# Patient Record
Sex: FEMALE | Race: BLACK OR AFRICAN AMERICAN | NOT HISPANIC OR LATINO | Employment: UNEMPLOYED | URBAN - METROPOLITAN AREA
[De-identification: names, ages, dates, MRNs, and addresses within clinical notes are randomized per-mention and may not be internally consistent; named-entity substitution may affect disease eponyms.]

---

## 2017-01-05 ENCOUNTER — HISTORICAL (OUTPATIENT)
Dept: RADIOLOGY | Facility: HOSPITAL | Age: 57
End: 2017-01-05

## 2017-01-09 ENCOUNTER — HISTORICAL (OUTPATIENT)
Dept: ADMINISTRATIVE | Facility: HOSPITAL | Age: 57
End: 2017-01-09

## 2017-01-26 ENCOUNTER — HISTORICAL (OUTPATIENT)
Dept: LAB | Facility: HOSPITAL | Age: 57
End: 2017-01-26

## 2017-02-06 ENCOUNTER — HISTORICAL (OUTPATIENT)
Dept: ADMINISTRATIVE | Facility: HOSPITAL | Age: 57
End: 2017-02-06

## 2017-03-06 ENCOUNTER — HISTORICAL (OUTPATIENT)
Dept: ADMINISTRATIVE | Facility: HOSPITAL | Age: 57
End: 2017-03-06

## 2017-03-27 ENCOUNTER — HISTORICAL (OUTPATIENT)
Dept: ADMINISTRATIVE | Facility: HOSPITAL | Age: 57
End: 2017-03-27

## 2017-04-25 ENCOUNTER — HISTORICAL (OUTPATIENT)
Dept: ADMINISTRATIVE | Facility: HOSPITAL | Age: 57
End: 2017-04-25

## 2017-05-03 ENCOUNTER — HISTORICAL (OUTPATIENT)
Dept: RADIOLOGY | Facility: HOSPITAL | Age: 57
End: 2017-05-03

## 2017-05-16 ENCOUNTER — HISTORICAL (OUTPATIENT)
Dept: ADMINISTRATIVE | Facility: HOSPITAL | Age: 57
End: 2017-05-16

## 2017-06-02 ENCOUNTER — HISTORICAL (OUTPATIENT)
Dept: ADMINISTRATIVE | Facility: HOSPITAL | Age: 57
End: 2017-06-02

## 2017-06-23 ENCOUNTER — HISTORICAL (OUTPATIENT)
Dept: ADMINISTRATIVE | Facility: HOSPITAL | Age: 57
End: 2017-06-23

## 2017-07-13 ENCOUNTER — HISTORICAL (OUTPATIENT)
Dept: ADMINISTRATIVE | Facility: HOSPITAL | Age: 57
End: 2017-07-13

## 2017-07-13 ENCOUNTER — HISTORICAL (OUTPATIENT)
Dept: RADIOLOGY | Facility: HOSPITAL | Age: 57
End: 2017-07-13

## 2017-07-24 ENCOUNTER — HISTORICAL (OUTPATIENT)
Dept: NUTRITION | Facility: HOSPITAL | Age: 57
End: 2017-07-24

## 2017-08-08 ENCOUNTER — HISTORICAL (OUTPATIENT)
Dept: ADMINISTRATIVE | Facility: HOSPITAL | Age: 57
End: 2017-08-08

## 2017-09-05 ENCOUNTER — HISTORICAL (OUTPATIENT)
Dept: ADMINISTRATIVE | Facility: HOSPITAL | Age: 57
End: 2017-09-05

## 2017-09-11 ENCOUNTER — HISTORICAL (OUTPATIENT)
Dept: RADIOLOGY | Facility: HOSPITAL | Age: 57
End: 2017-09-11

## 2017-10-05 ENCOUNTER — HISTORICAL (OUTPATIENT)
Dept: ADMINISTRATIVE | Facility: HOSPITAL | Age: 57
End: 2017-10-05

## 2017-11-02 ENCOUNTER — HISTORICAL (OUTPATIENT)
Dept: ADMINISTRATIVE | Facility: HOSPITAL | Age: 57
End: 2017-11-02

## 2017-11-30 ENCOUNTER — HISTORICAL (OUTPATIENT)
Dept: ADMINISTRATIVE | Facility: HOSPITAL | Age: 57
End: 2017-11-30

## 2017-12-15 ENCOUNTER — HISTORICAL (OUTPATIENT)
Dept: LAB | Facility: HOSPITAL | Age: 57
End: 2017-12-15

## 2017-12-21 ENCOUNTER — HISTORICAL (OUTPATIENT)
Dept: ADMINISTRATIVE | Facility: HOSPITAL | Age: 57
End: 2017-12-21

## 2018-01-16 ENCOUNTER — HISTORICAL (OUTPATIENT)
Dept: ADMINISTRATIVE | Facility: HOSPITAL | Age: 58
End: 2018-01-16

## 2018-02-16 ENCOUNTER — HISTORICAL (OUTPATIENT)
Dept: ADMINISTRATIVE | Facility: HOSPITAL | Age: 58
End: 2018-02-16

## 2018-02-23 ENCOUNTER — HISTORICAL (OUTPATIENT)
Dept: ENDOSCOPY | Facility: HOSPITAL | Age: 58
End: 2018-02-23

## 2018-03-13 ENCOUNTER — HISTORICAL (OUTPATIENT)
Dept: ADMINISTRATIVE | Facility: HOSPITAL | Age: 58
End: 2018-03-13

## 2018-04-12 ENCOUNTER — HISTORICAL (OUTPATIENT)
Dept: ADMINISTRATIVE | Facility: HOSPITAL | Age: 58
End: 2018-04-12

## 2018-05-10 ENCOUNTER — HISTORICAL (OUTPATIENT)
Dept: ADMINISTRATIVE | Facility: HOSPITAL | Age: 58
End: 2018-05-10

## 2018-05-24 ENCOUNTER — HISTORICAL (OUTPATIENT)
Dept: RADIOLOGY | Facility: HOSPITAL | Age: 58
End: 2018-05-24

## 2018-06-04 ENCOUNTER — HISTORICAL (OUTPATIENT)
Dept: LAB | Facility: HOSPITAL | Age: 58
End: 2018-06-04

## 2018-06-07 ENCOUNTER — HISTORICAL (OUTPATIENT)
Dept: ADMINISTRATIVE | Facility: HOSPITAL | Age: 58
End: 2018-06-07

## 2018-06-29 ENCOUNTER — HISTORICAL (OUTPATIENT)
Dept: ENDOSCOPY | Facility: HOSPITAL | Age: 58
End: 2018-06-29

## 2018-07-05 ENCOUNTER — HISTORICAL (OUTPATIENT)
Dept: ADMINISTRATIVE | Facility: HOSPITAL | Age: 58
End: 2018-07-05

## 2018-07-17 ENCOUNTER — HISTORICAL (OUTPATIENT)
Dept: ADMINISTRATIVE | Facility: HOSPITAL | Age: 58
End: 2018-07-17

## 2018-08-14 ENCOUNTER — HISTORICAL (OUTPATIENT)
Dept: ADMINISTRATIVE | Facility: HOSPITAL | Age: 58
End: 2018-08-14

## 2018-09-11 ENCOUNTER — HISTORICAL (OUTPATIENT)
Dept: ADMINISTRATIVE | Facility: HOSPITAL | Age: 58
End: 2018-09-11

## 2018-09-13 ENCOUNTER — HISTORICAL (OUTPATIENT)
Dept: LAB | Facility: HOSPITAL | Age: 58
End: 2018-09-13

## 2018-10-09 ENCOUNTER — HISTORICAL (OUTPATIENT)
Dept: ADMINISTRATIVE | Facility: HOSPITAL | Age: 58
End: 2018-10-09

## 2018-10-18 ENCOUNTER — HISTORICAL (OUTPATIENT)
Dept: LAB | Facility: HOSPITAL | Age: 58
End: 2018-10-18

## 2018-10-23 ENCOUNTER — HISTORICAL (OUTPATIENT)
Dept: ADMINISTRATIVE | Facility: HOSPITAL | Age: 58
End: 2018-10-23

## 2018-11-16 ENCOUNTER — HISTORICAL (OUTPATIENT)
Dept: ADMINISTRATIVE | Facility: HOSPITAL | Age: 58
End: 2018-11-16

## 2018-12-06 ENCOUNTER — HISTORICAL (OUTPATIENT)
Dept: ADMINISTRATIVE | Facility: HOSPITAL | Age: 58
End: 2018-12-06

## 2019-01-03 ENCOUNTER — HISTORICAL (OUTPATIENT)
Dept: ADMINISTRATIVE | Facility: HOSPITAL | Age: 59
End: 2019-01-03

## 2019-01-31 ENCOUNTER — HISTORICAL (OUTPATIENT)
Dept: ADMINISTRATIVE | Facility: HOSPITAL | Age: 59
End: 2019-01-31

## 2019-02-28 ENCOUNTER — HISTORICAL (OUTPATIENT)
Dept: ADMINISTRATIVE | Facility: HOSPITAL | Age: 59
End: 2019-02-28

## 2019-04-04 ENCOUNTER — HISTORICAL (OUTPATIENT)
Dept: ADMINISTRATIVE | Facility: HOSPITAL | Age: 59
End: 2019-04-04

## 2019-05-02 ENCOUNTER — HISTORICAL (OUTPATIENT)
Dept: ADMINISTRATIVE | Facility: HOSPITAL | Age: 59
End: 2019-05-02

## 2019-05-21 ENCOUNTER — HISTORICAL (OUTPATIENT)
Dept: LAB | Facility: HOSPITAL | Age: 59
End: 2019-05-21

## 2019-05-30 ENCOUNTER — HISTORICAL (OUTPATIENT)
Dept: ADMINISTRATIVE | Facility: HOSPITAL | Age: 59
End: 2019-05-30

## 2019-06-19 ENCOUNTER — HISTORICAL (OUTPATIENT)
Dept: RADIOLOGY | Facility: HOSPITAL | Age: 59
End: 2019-06-19

## 2019-06-27 ENCOUNTER — HISTORICAL (OUTPATIENT)
Dept: ADMINISTRATIVE | Facility: HOSPITAL | Age: 59
End: 2019-06-27

## 2019-07-25 ENCOUNTER — HISTORICAL (OUTPATIENT)
Dept: ADMINISTRATIVE | Facility: HOSPITAL | Age: 59
End: 2019-07-25

## 2019-08-22 ENCOUNTER — HISTORICAL (OUTPATIENT)
Dept: ADMINISTRATIVE | Facility: HOSPITAL | Age: 59
End: 2019-08-22

## 2019-09-19 ENCOUNTER — HISTORICAL (OUTPATIENT)
Dept: ADMINISTRATIVE | Facility: HOSPITAL | Age: 59
End: 2019-09-19

## 2019-10-10 ENCOUNTER — HISTORICAL (OUTPATIENT)
Dept: LAB | Facility: HOSPITAL | Age: 59
End: 2019-10-10

## 2019-10-24 ENCOUNTER — HISTORICAL (OUTPATIENT)
Dept: ADMINISTRATIVE | Facility: HOSPITAL | Age: 59
End: 2019-10-24

## 2019-11-21 ENCOUNTER — HISTORICAL (OUTPATIENT)
Dept: ADMINISTRATIVE | Facility: HOSPITAL | Age: 59
End: 2019-11-21

## 2019-12-19 ENCOUNTER — HISTORICAL (OUTPATIENT)
Dept: ADMINISTRATIVE | Facility: HOSPITAL | Age: 59
End: 2019-12-19

## 2020-01-16 ENCOUNTER — HISTORICAL (OUTPATIENT)
Dept: ADMINISTRATIVE | Facility: HOSPITAL | Age: 60
End: 2020-01-16

## 2020-01-16 ENCOUNTER — HISTORICAL (OUTPATIENT)
Dept: LAB | Facility: HOSPITAL | Age: 60
End: 2020-01-16

## 2020-02-13 ENCOUNTER — HISTORICAL (OUTPATIENT)
Dept: ADMINISTRATIVE | Facility: HOSPITAL | Age: 60
End: 2020-02-13

## 2020-03-19 ENCOUNTER — HISTORICAL (OUTPATIENT)
Dept: ADMINISTRATIVE | Facility: HOSPITAL | Age: 60
End: 2020-03-19

## 2020-04-16 ENCOUNTER — HISTORICAL (OUTPATIENT)
Dept: ADMINISTRATIVE | Facility: HOSPITAL | Age: 60
End: 2020-04-16

## 2020-05-14 ENCOUNTER — HISTORICAL (OUTPATIENT)
Dept: ADMINISTRATIVE | Facility: HOSPITAL | Age: 60
End: 2020-05-14

## 2020-06-11 ENCOUNTER — HISTORICAL (OUTPATIENT)
Dept: ADMINISTRATIVE | Facility: HOSPITAL | Age: 60
End: 2020-06-11

## 2020-07-09 ENCOUNTER — HISTORICAL (OUTPATIENT)
Dept: ADMINISTRATIVE | Facility: HOSPITAL | Age: 60
End: 2020-07-09

## 2020-08-13 ENCOUNTER — HISTORICAL (OUTPATIENT)
Dept: ADMINISTRATIVE | Facility: HOSPITAL | Age: 60
End: 2020-08-13

## 2020-09-03 ENCOUNTER — HISTORICAL (OUTPATIENT)
Dept: ADMINISTRATIVE | Facility: HOSPITAL | Age: 60
End: 2020-09-03

## 2020-09-21 ENCOUNTER — HISTORICAL (OUTPATIENT)
Dept: LAB | Facility: HOSPITAL | Age: 60
End: 2020-09-21

## 2020-10-01 ENCOUNTER — HISTORICAL (OUTPATIENT)
Dept: ADMINISTRATIVE | Facility: HOSPITAL | Age: 60
End: 2020-10-01

## 2020-10-22 ENCOUNTER — HISTORICAL (OUTPATIENT)
Dept: ADMINISTRATIVE | Facility: HOSPITAL | Age: 60
End: 2020-10-22

## 2020-10-27 ENCOUNTER — HISTORICAL (OUTPATIENT)
Dept: RADIOLOGY | Facility: HOSPITAL | Age: 60
End: 2020-10-27

## 2020-11-05 ENCOUNTER — HISTORICAL (OUTPATIENT)
Dept: ADMINISTRATIVE | Facility: HOSPITAL | Age: 60
End: 2020-11-05

## 2020-12-01 ENCOUNTER — HISTORICAL (OUTPATIENT)
Dept: ADMINISTRATIVE | Facility: HOSPITAL | Age: 60
End: 2020-12-01

## 2020-12-29 ENCOUNTER — HISTORICAL (OUTPATIENT)
Dept: ADMINISTRATIVE | Facility: HOSPITAL | Age: 60
End: 2020-12-29

## 2020-12-29 ENCOUNTER — HISTORICAL (OUTPATIENT)
Dept: LAB | Facility: HOSPITAL | Age: 60
End: 2020-12-29

## 2021-01-19 ENCOUNTER — HISTORICAL (OUTPATIENT)
Dept: ADMINISTRATIVE | Facility: HOSPITAL | Age: 61
End: 2021-01-19

## 2021-02-25 ENCOUNTER — HISTORICAL (OUTPATIENT)
Dept: ADMINISTRATIVE | Facility: HOSPITAL | Age: 61
End: 2021-02-25

## 2021-03-25 ENCOUNTER — HISTORICAL (OUTPATIENT)
Dept: ADMINISTRATIVE | Facility: HOSPITAL | Age: 61
End: 2021-03-25

## 2021-04-22 ENCOUNTER — HISTORICAL (OUTPATIENT)
Dept: ADMINISTRATIVE | Facility: HOSPITAL | Age: 61
End: 2021-04-22

## 2021-05-20 ENCOUNTER — HISTORICAL (OUTPATIENT)
Dept: ADMINISTRATIVE | Facility: HOSPITAL | Age: 61
End: 2021-05-20

## 2021-06-17 ENCOUNTER — HISTORICAL (OUTPATIENT)
Dept: ADMINISTRATIVE | Facility: HOSPITAL | Age: 61
End: 2021-06-17

## 2021-07-15 ENCOUNTER — HISTORICAL (OUTPATIENT)
Dept: ADMINISTRATIVE | Facility: HOSPITAL | Age: 61
End: 2021-07-15

## 2021-08-19 ENCOUNTER — HISTORICAL (OUTPATIENT)
Dept: ADMINISTRATIVE | Facility: HOSPITAL | Age: 61
End: 2021-08-19

## 2021-09-16 ENCOUNTER — HISTORICAL (OUTPATIENT)
Dept: ADMINISTRATIVE | Facility: HOSPITAL | Age: 61
End: 2021-09-16

## 2021-09-16 ENCOUNTER — HISTORICAL (OUTPATIENT)
Dept: LAB | Facility: HOSPITAL | Age: 61
End: 2021-09-16

## 2021-10-19 ENCOUNTER — HISTORICAL (OUTPATIENT)
Dept: ADMINISTRATIVE | Facility: HOSPITAL | Age: 61
End: 2021-10-19

## 2021-11-09 ENCOUNTER — HISTORICAL (OUTPATIENT)
Dept: ADMINISTRATIVE | Facility: HOSPITAL | Age: 61
End: 2021-11-09

## 2021-11-30 ENCOUNTER — HISTORICAL (OUTPATIENT)
Dept: ADMINISTRATIVE | Facility: HOSPITAL | Age: 61
End: 2021-11-30

## 2021-12-08 ENCOUNTER — HISTORICAL (OUTPATIENT)
Dept: LAB | Facility: HOSPITAL | Age: 61
End: 2021-12-08

## 2021-12-23 ENCOUNTER — HISTORICAL (OUTPATIENT)
Dept: ADMINISTRATIVE | Facility: HOSPITAL | Age: 61
End: 2021-12-23

## 2022-01-20 ENCOUNTER — HISTORICAL (OUTPATIENT)
Dept: ADMINISTRATIVE | Facility: HOSPITAL | Age: 62
End: 2022-01-20

## 2022-01-20 LAB
INR PPP: 2.4
PROTHROMBIN TIME: 29 S

## 2022-02-24 ENCOUNTER — HISTORICAL (OUTPATIENT)
Dept: ADMINISTRATIVE | Facility: HOSPITAL | Age: 62
End: 2022-02-24

## 2022-03-24 ENCOUNTER — HISTORICAL (OUTPATIENT)
Dept: ADMINISTRATIVE | Facility: HOSPITAL | Age: 62
End: 2022-03-24

## 2022-03-28 ENCOUNTER — HISTORICAL (OUTPATIENT)
Dept: RADIOLOGY | Facility: HOSPITAL | Age: 62
End: 2022-03-28

## 2022-04-21 ENCOUNTER — HISTORICAL (OUTPATIENT)
Dept: ADMINISTRATIVE | Facility: HOSPITAL | Age: 62
End: 2022-04-21
Payer: MEDICARE

## 2022-05-18 DIAGNOSIS — Z86.718 PERSONAL HISTORY OF VENOUS THROMBOSIS AND EMBOLISM: Primary | ICD-10-CM

## 2022-05-18 DIAGNOSIS — Z86.711 PERSONAL HISTORY OF PE (PULMONARY EMBOLISM): ICD-10-CM

## 2022-05-19 ENCOUNTER — ANTI-COAG VISIT (OUTPATIENT)
Dept: CARDIOLOGY | Facility: HOSPITAL | Age: 62
End: 2022-05-19
Payer: MEDICARE

## 2022-05-19 DIAGNOSIS — Z86.711 PERSONAL HISTORY OF PE (PULMONARY EMBOLISM): ICD-10-CM

## 2022-05-19 DIAGNOSIS — Z86.718 PERSONAL HISTORY OF VENOUS THROMBOSIS AND EMBOLISM: ICD-10-CM

## 2022-05-19 LAB — INR PPP: 2.7 (ref 2–3)

## 2022-05-19 PROCEDURE — 99211 OFF/OP EST MAY X REQ PHY/QHP: CPT

## 2022-05-19 PROCEDURE — 85610 PROTHROMBIN TIME: CPT

## 2022-05-26 DIAGNOSIS — Z87.891 HISTORY OF TOBACCO USE: Primary | ICD-10-CM

## 2022-06-06 DIAGNOSIS — Z86.711 PERSONAL HISTORY OF PE (PULMONARY EMBOLISM): Primary | ICD-10-CM

## 2022-06-06 DIAGNOSIS — Z86.718 PERSONAL HISTORY OF VENOUS THROMBOSIS AND EMBOLISM: ICD-10-CM

## 2022-06-07 ENCOUNTER — HOSPITAL ENCOUNTER (OUTPATIENT)
Dept: RADIOLOGY | Facility: HOSPITAL | Age: 62
Discharge: HOME | End: 2022-06-07
Attending: INTERNAL MEDICINE
Payer: MEDICARE

## 2022-06-07 DIAGNOSIS — Z87.891 HISTORY OF TOBACCO USE: ICD-10-CM

## 2022-06-07 PROCEDURE — 71271 CT THORAX LUNG CANCER SCR C-: CPT | Mod: TC

## 2022-06-08 ENCOUNTER — DOCUMENTATION ONLY (OUTPATIENT)
Dept: RADIOLOGY | Facility: HOSPITAL | Age: 62
End: 2022-06-08
Payer: MEDICARE

## 2022-06-08 VITALS — WEIGHT: 200 LBS | HEIGHT: 66 IN | BODY MASS INDEX: 32.14 KG/M2

## 2022-06-21 ENCOUNTER — ANTI-COAG VISIT (OUTPATIENT)
Dept: CARDIOLOGY | Facility: HOSPITAL | Age: 62
End: 2022-06-21
Payer: MEDICARE

## 2022-06-21 DIAGNOSIS — Z86.711 PERSONAL HISTORY OF PE (PULMONARY EMBOLISM): ICD-10-CM

## 2022-06-21 DIAGNOSIS — Z86.718 PERSONAL HISTORY OF VENOUS THROMBOSIS AND EMBOLISM: ICD-10-CM

## 2022-06-21 LAB — INR PPP: 2.7 (ref 2–3)

## 2022-06-21 PROCEDURE — 99211 OFF/OP EST MAY X REQ PHY/QHP: CPT

## 2022-06-21 PROCEDURE — 85610 PROTHROMBIN TIME: CPT

## 2022-06-30 DIAGNOSIS — Z86.711 PERSONAL HISTORY OF PE (PULMONARY EMBOLISM): Primary | ICD-10-CM

## 2022-06-30 DIAGNOSIS — Z86.718 PERSONAL HISTORY OF DVT (DEEP VEIN THROMBOSIS): ICD-10-CM

## 2022-07-07 ENCOUNTER — LAB VISIT (OUTPATIENT)
Dept: LAB | Facility: HOSPITAL | Age: 62
End: 2022-07-07
Attending: FAMILY MEDICINE
Payer: MEDICARE

## 2022-07-07 DIAGNOSIS — E11.9 DIABETES MELLITUS WITHOUT COMPLICATION: Primary | ICD-10-CM

## 2022-07-07 DIAGNOSIS — I10 ESSENTIAL HYPERTENSION, MALIGNANT: ICD-10-CM

## 2022-07-07 LAB
ALBUMIN SERPL-MCNC: 3.8 GM/DL (ref 3.4–4.8)
ALBUMIN/GLOB SERPL: 1.2 RATIO (ref 1.1–2)
ALP SERPL-CCNC: 64 UNIT/L (ref 40–150)
ALT SERPL-CCNC: 25 UNIT/L (ref 0–55)
AST SERPL-CCNC: 22 UNIT/L (ref 5–34)
BILIRUBIN DIRECT+TOT PNL SERPL-MCNC: 0.3 MG/DL
BUN SERPL-MCNC: 12.7 MG/DL (ref 9.8–20.1)
CALCIUM SERPL-MCNC: 9.8 MG/DL (ref 8.4–10.2)
CHLORIDE SERPL-SCNC: 105 MMOL/L (ref 98–107)
CO2 SERPL-SCNC: 27 MMOL/L (ref 23–31)
CREAT SERPL-MCNC: 0.89 MG/DL (ref 0.55–1.02)
EST. AVERAGE GLUCOSE BLD GHB EST-MCNC: 211.6 MG/DL
GLOBULIN SER-MCNC: 3.1 GM/DL (ref 2.4–3.5)
GLUCOSE SERPL-MCNC: 131 MG/DL (ref 82–115)
HBA1C MFR BLD: 9 %
POTASSIUM SERPL-SCNC: 3.5 MMOL/L (ref 3.5–5.1)
PROT SERPL-MCNC: 6.9 GM/DL (ref 5.8–7.6)
SODIUM SERPL-SCNC: 140 MMOL/L (ref 136–145)

## 2022-07-07 PROCEDURE — 80053 COMPREHEN METABOLIC PANEL: CPT

## 2022-07-07 PROCEDURE — 83036 HEMOGLOBIN GLYCOSYLATED A1C: CPT

## 2022-07-07 PROCEDURE — 36415 COLL VENOUS BLD VENIPUNCTURE: CPT

## 2022-07-12 DIAGNOSIS — M25.511 RIGHT SHOULDER PAIN: Primary | ICD-10-CM

## 2022-07-19 ENCOUNTER — ANTI-COAG VISIT (OUTPATIENT)
Dept: CARDIOLOGY | Facility: HOSPITAL | Age: 62
End: 2022-07-19
Payer: MEDICARE

## 2022-07-19 DIAGNOSIS — Z86.711 PERSONAL HISTORY OF PE (PULMONARY EMBOLISM): ICD-10-CM

## 2022-07-19 DIAGNOSIS — Z86.718 PERSONAL HISTORY OF VENOUS THROMBOSIS AND EMBOLISM: ICD-10-CM

## 2022-07-19 LAB — INR PPP: 2 (ref 2–3)

## 2022-07-19 PROCEDURE — 99211 OFF/OP EST MAY X REQ PHY/QHP: CPT

## 2022-07-19 PROCEDURE — 85610 PROTHROMBIN TIME: CPT

## 2022-08-16 ENCOUNTER — ANTI-COAG VISIT (OUTPATIENT)
Dept: CARDIOLOGY | Facility: HOSPITAL | Age: 62
End: 2022-08-16
Payer: MEDICARE

## 2022-08-16 DIAGNOSIS — Z86.711 PERSONAL HISTORY OF PE (PULMONARY EMBOLISM): ICD-10-CM

## 2022-08-16 DIAGNOSIS — Z86.718 PERSONAL HISTORY OF DVT (DEEP VEIN THROMBOSIS): ICD-10-CM

## 2022-08-16 LAB — INR PPP: 2.6 (ref 2–3)

## 2022-08-16 PROCEDURE — 85610 PROTHROMBIN TIME: CPT

## 2022-08-16 PROCEDURE — 99211 OFF/OP EST MAY X REQ PHY/QHP: CPT

## 2022-08-24 ENCOUNTER — OFFICE VISIT (OUTPATIENT)
Dept: ORTHOPEDICS | Facility: CLINIC | Age: 62
End: 2022-08-24
Payer: MEDICARE

## 2022-08-24 VITALS
BODY MASS INDEX: 32.96 KG/M2 | HEIGHT: 67 IN | WEIGHT: 210 LBS | TEMPERATURE: 98 F | DIASTOLIC BLOOD PRESSURE: 70 MMHG | HEART RATE: 74 BPM | SYSTOLIC BLOOD PRESSURE: 104 MMHG

## 2022-08-24 DIAGNOSIS — M65.341 ACQUIRED TRIGGER FINGER OF RIGHT RING FINGER: ICD-10-CM

## 2022-08-24 DIAGNOSIS — M54.12 CERVICAL RADICULOPATHY: ICD-10-CM

## 2022-08-24 DIAGNOSIS — M25.511 RIGHT SHOULDER PAIN, UNSPECIFIED CHRONICITY: Primary | ICD-10-CM

## 2022-08-24 DIAGNOSIS — M75.41 SHOULDER IMPINGEMENT SYNDROME, RIGHT: ICD-10-CM

## 2022-08-24 DIAGNOSIS — M75.111 NONTRAUMATIC INCOMPLETE TEAR OF RIGHT ROTATOR CUFF: ICD-10-CM

## 2022-08-24 DIAGNOSIS — M25.511 RIGHT SHOULDER PAIN: ICD-10-CM

## 2022-08-24 PROCEDURE — 4010F ACE/ARB THERAPY RXD/TAKEN: CPT | Mod: CPTII,,, | Performed by: ORTHOPAEDIC SURGERY

## 2022-08-24 PROCEDURE — 1159F PR MEDICATION LIST DOCUMENTED IN MEDICAL RECORD: ICD-10-PCS | Mod: CPTII,,, | Performed by: ORTHOPAEDIC SURGERY

## 2022-08-24 PROCEDURE — 3008F BODY MASS INDEX DOCD: CPT | Mod: CPTII,,, | Performed by: ORTHOPAEDIC SURGERY

## 2022-08-24 PROCEDURE — 4010F PR ACE/ARB THEARPY RXD/TAKEN: ICD-10-PCS | Mod: CPTII,,, | Performed by: ORTHOPAEDIC SURGERY

## 2022-08-24 PROCEDURE — 99204 PR OFFICE/OUTPT VISIT, NEW, LEVL IV, 45-59 MIN: ICD-10-PCS | Mod: 25,,, | Performed by: ORTHOPAEDIC SURGERY

## 2022-08-24 PROCEDURE — 3078F PR MOST RECENT DIASTOLIC BLOOD PRESSURE < 80 MM HG: ICD-10-PCS | Mod: CPTII,,, | Performed by: ORTHOPAEDIC SURGERY

## 2022-08-24 PROCEDURE — 20610 LARGE JOINT ASPIRATION/INJECTION: R SUBACROMIAL BURSA: ICD-10-PCS | Mod: RT,,, | Performed by: ORTHOPAEDIC SURGERY

## 2022-08-24 PROCEDURE — 3008F PR BODY MASS INDEX (BMI) DOCUMENTED: ICD-10-PCS | Mod: CPTII,,, | Performed by: ORTHOPAEDIC SURGERY

## 2022-08-24 PROCEDURE — 3074F PR MOST RECENT SYSTOLIC BLOOD PRESSURE < 130 MM HG: ICD-10-PCS | Mod: CPTII,,, | Performed by: ORTHOPAEDIC SURGERY

## 2022-08-24 PROCEDURE — 99204 OFFICE O/P NEW MOD 45 MIN: CPT | Mod: 25,,, | Performed by: ORTHOPAEDIC SURGERY

## 2022-08-24 PROCEDURE — 3078F DIAST BP <80 MM HG: CPT | Mod: CPTII,,, | Performed by: ORTHOPAEDIC SURGERY

## 2022-08-24 PROCEDURE — 20610 DRAIN/INJ JOINT/BURSA W/O US: CPT | Mod: RT,,, | Performed by: ORTHOPAEDIC SURGERY

## 2022-08-24 PROCEDURE — 1159F MED LIST DOCD IN RCRD: CPT | Mod: CPTII,,, | Performed by: ORTHOPAEDIC SURGERY

## 2022-08-24 PROCEDURE — 1160F PR REVIEW ALL MEDS BY PRESCRIBER/CLIN PHARMACIST DOCUMENTED: ICD-10-PCS | Mod: CPTII,,, | Performed by: ORTHOPAEDIC SURGERY

## 2022-08-24 PROCEDURE — 3074F SYST BP LT 130 MM HG: CPT | Mod: CPTII,,, | Performed by: ORTHOPAEDIC SURGERY

## 2022-08-24 PROCEDURE — 1160F RVW MEDS BY RX/DR IN RCRD: CPT | Mod: CPTII,,, | Performed by: ORTHOPAEDIC SURGERY

## 2022-08-24 RX ORDER — TRIAMCINOLONE ACETONIDE 1 MG/G
CREAM TOPICAL
COMMUNITY
Start: 2022-08-04

## 2022-08-24 RX ORDER — LIDOCAINE HYDROCHLORIDE 20 MG/ML
3 INJECTION, SOLUTION INFILTRATION; PERINEURAL
Status: DISCONTINUED | OUTPATIENT
Start: 2022-08-24 | End: 2022-08-24 | Stop reason: HOSPADM

## 2022-08-24 RX ORDER — TRAZODONE HYDROCHLORIDE 150 MG/1
TABLET ORAL
COMMUNITY
Start: 2022-07-07

## 2022-08-24 RX ORDER — SITAGLIPTIN 50 MG/1
TABLET, FILM COATED ORAL
COMMUNITY
Start: 2022-07-07

## 2022-08-24 RX ORDER — TIZANIDINE 4 MG/1
TABLET ORAL
COMMUNITY
Start: 2022-07-07

## 2022-08-24 RX ORDER — LISINOPRIL AND HYDROCHLOROTHIAZIDE 12.5; 2 MG/1; MG/1
TABLET ORAL
COMMUNITY
Start: 2022-07-07

## 2022-08-24 RX ORDER — BETAMETHASONE SODIUM PHOSPHATE AND BETAMETHASONE ACETATE 3; 3 MG/ML; MG/ML
12 INJECTION, SUSPENSION INTRA-ARTICULAR; INTRALESIONAL; INTRAMUSCULAR; SOFT TISSUE
Status: DISCONTINUED | OUTPATIENT
Start: 2022-08-24 | End: 2022-08-24 | Stop reason: HOSPADM

## 2022-08-24 RX ORDER — GABAPENTIN 300 MG/1
CAPSULE ORAL
COMMUNITY
Start: 2022-07-07

## 2022-08-24 RX ORDER — AMLODIPINE BESYLATE 5 MG/1
TABLET ORAL
COMMUNITY
Start: 2022-07-07

## 2022-08-24 RX ORDER — CITALOPRAM 20 MG/1
TABLET, FILM COATED ORAL
COMMUNITY
Start: 2022-07-07

## 2022-08-24 RX ORDER — GLIMEPIRIDE 2 MG/1
TABLET ORAL
COMMUNITY
Start: 2022-07-07

## 2022-08-24 RX ORDER — WARFARIN 10 MG/1
TABLET ORAL
COMMUNITY
Start: 2022-07-01

## 2022-08-24 RX ORDER — EMPAGLIFLOZIN 25 MG/1
TABLET, FILM COATED ORAL
COMMUNITY
Start: 2022-07-07

## 2022-08-24 RX ORDER — ROSUVASTATIN CALCIUM 40 MG/1
TABLET, COATED ORAL
COMMUNITY
Start: 2022-07-21

## 2022-08-24 RX ORDER — MIRABEGRON 50 MG/1
TABLET, FILM COATED, EXTENDED RELEASE ORAL
COMMUNITY
Start: 2022-07-07

## 2022-08-24 RX ADMIN — LIDOCAINE HYDROCHLORIDE 3 MG: 20 INJECTION, SOLUTION INFILTRATION; PERINEURAL at 08:08

## 2022-08-24 RX ADMIN — BETAMETHASONE SODIUM PHOSPHATE AND BETAMETHASONE ACETATE 12 MG: 3; 3 INJECTION, SUSPENSION INTRA-ARTICULAR; INTRALESIONAL; INTRAMUSCULAR; SOFT TISSUE at 08:08

## 2022-08-24 NOTE — PROCEDURES
Large Joint Aspiration/Injection: R subacromial bursa    Date/Time: 8/24/2022 8:30 AM  Performed by: Freddie Jay MD  Authorized by: Freddie Jay MD     Consent Done?:  Yes (Verbal)  Indications:  Pain  Site marked: the procedure site was marked    Timeout: prior to procedure the correct patient, procedure, and site was verified    Prep: patient was prepped and draped in usual sterile fashion    Approach:  Posterior  Location:  Shoulder  Site:  R subacromial bursa  Medications:  12 mg betamethasone acetate-betamethasone sodium phosphate 6 mg/mL; 3 mg LIDOcaine HCL 20 mg/ml (2%) 20 mg/mL (2 %)  Patient tolerance:  Patient tolerated the procedure well with no immediate complications

## 2022-08-24 NOTE — PROGRESS NOTES
Subjective:    CC: Numbness and Pain of the Right Shoulder (NP having pain from the right shoulder to the fingers. Numbness in the fingers  No injury or accident. Exercise hands to help with the pain and swelling.  Having difficulty with lifting the arm and above the head )       HPI:  Patient comes in today complaining of pain and loss of motion of her right shoulder.  She occasional have some shooting pains down down into her finger.  She also complains of catching in her right middle finger.  This is gone on for the last several months.  She denies any new or specific trauma she denies other complaints.    ROS: Refer to HPI for pertinent ROS. All other 12 point systems negative.    Objective:    Physical Exam:  Patient is well-nourished and well-developed, in no apparent distress, pleasant and cooperative. Examination of the right upper extremity compartments are soft and warm.  Skin is intact. There are no signs or symptoms of DVT or infection.   Patient is tender to palpation along the  anterior anterolateral .  Patient is able to forward flex and abduct to 80.  Positive Atkinson and Neers, positive empty can, questionable drop arm test. Negative sulcus sign. Stable to stressing. Neurovascularly intact distally.  There is no obvious triggering of her right ring finger today.  She is minimally tender along the A1 pulley.    Images:  X-rays three views right shoulder demonstrate no obvious fracture or dislocation. Images Reviewed and discussed with patient.    Assessment:  1. Right shoulder pain, unspecified chronicity  - X-ray Shoulder 2 or More Views Right; Future    2. Right shoulder pain  - Ambulatory referral/consult to Orthopedics    3. Nontraumatic incomplete tear of right rotator cuff    4. Shoulder impingement syndrome, right  - Large Joint Aspiration/Injection: R subacromial bursa  - betamethasone acetate-betamethasone sodium phosphate injection 12 mg  - LIDOcaine HCL 20 mg/ml (2%) injection 3 mg    5.  Acquired trigger finger of right ring finger    6. Cervical radiculopathy        Plan:  At this time we discussed her physical exam and x-ray findings.  We have discussed her shoulder impingement suspected partial tear.  She would like to hold off on an MRI.  She tolerated injection very well to the right shoulder we discussed formal therapy versus home exercises.  I would like see back in 4 weeks to see how she is progressing.  We have discussed MRI she does not improve.    Follow UP: No follow-ups on file.    Large Joint Aspiration/Injection: R subacromial bursa    Date/Time: 8/24/2022 8:30 AM  Performed by: Freddie Jay MD  Authorized by: Freddie Jay MD     Consent Done?:  Yes (Verbal)  Indications:  Pain  Site marked: the procedure site was marked    Timeout: prior to procedure the correct patient, procedure, and site was verified    Prep: patient was prepped and draped in usual sterile fashion    Approach:  Posterior  Location:  Shoulder  Site:  R subacromial bursa  Medications:  12 mg betamethasone acetate-betamethasone sodium phosphate 6 mg/mL; 3 mg LIDOcaine HCL 20 mg/ml (2%) 20 mg/mL (2 %)  Patient tolerance:  Patient tolerated the procedure well with no immediate complications

## 2022-09-13 ENCOUNTER — ANTI-COAG VISIT (OUTPATIENT)
Dept: CARDIOLOGY | Facility: HOSPITAL | Age: 62
End: 2022-09-13
Payer: MEDICARE

## 2022-09-13 DIAGNOSIS — Z86.718 PERSONAL HISTORY OF DVT (DEEP VEIN THROMBOSIS): ICD-10-CM

## 2022-09-13 DIAGNOSIS — Z86.711 PERSONAL HISTORY OF PE (PULMONARY EMBOLISM): ICD-10-CM

## 2022-09-13 LAB — INR PPP: 2.6 (ref 2–3)

## 2022-09-21 ENCOUNTER — OFFICE VISIT (OUTPATIENT)
Dept: ORTHOPEDICS | Facility: CLINIC | Age: 62
End: 2022-09-21
Payer: MEDICARE

## 2022-09-21 VITALS
HEART RATE: 83 BPM | SYSTOLIC BLOOD PRESSURE: 117 MMHG | DIASTOLIC BLOOD PRESSURE: 74 MMHG | WEIGHT: 210 LBS | HEIGHT: 67 IN | BODY MASS INDEX: 32.96 KG/M2 | TEMPERATURE: 98 F

## 2022-09-21 DIAGNOSIS — S46.011D TRAUMATIC COMPLETE TEAR OF RIGHT ROTATOR CUFF, SUBSEQUENT ENCOUNTER: ICD-10-CM

## 2022-09-21 DIAGNOSIS — M25.511 ACUTE PAIN OF RIGHT SHOULDER: ICD-10-CM

## 2022-09-21 DIAGNOSIS — M75.41 SHOULDER IMPINGEMENT SYNDROME, RIGHT: Primary | ICD-10-CM

## 2022-09-21 PROCEDURE — 4010F PR ACE/ARB THEARPY RXD/TAKEN: ICD-10-PCS | Mod: CPTII,,, | Performed by: ORTHOPAEDIC SURGERY

## 2022-09-21 PROCEDURE — 99213 PR OFFICE/OUTPT VISIT, EST, LEVL III, 20-29 MIN: ICD-10-PCS | Mod: ,,, | Performed by: ORTHOPAEDIC SURGERY

## 2022-09-21 PROCEDURE — 4010F ACE/ARB THERAPY RXD/TAKEN: CPT | Mod: CPTII,,, | Performed by: ORTHOPAEDIC SURGERY

## 2022-09-21 PROCEDURE — 99213 OFFICE O/P EST LOW 20 MIN: CPT | Mod: ,,, | Performed by: ORTHOPAEDIC SURGERY

## 2022-09-21 PROCEDURE — 1159F PR MEDICATION LIST DOCUMENTED IN MEDICAL RECORD: ICD-10-PCS | Mod: CPTII,,, | Performed by: ORTHOPAEDIC SURGERY

## 2022-09-21 PROCEDURE — 1160F RVW MEDS BY RX/DR IN RCRD: CPT | Mod: CPTII,,, | Performed by: ORTHOPAEDIC SURGERY

## 2022-09-21 PROCEDURE — 3074F PR MOST RECENT SYSTOLIC BLOOD PRESSURE < 130 MM HG: ICD-10-PCS | Mod: CPTII,,, | Performed by: ORTHOPAEDIC SURGERY

## 2022-09-21 PROCEDURE — 1159F MED LIST DOCD IN RCRD: CPT | Mod: CPTII,,, | Performed by: ORTHOPAEDIC SURGERY

## 2022-09-21 PROCEDURE — 3078F PR MOST RECENT DIASTOLIC BLOOD PRESSURE < 80 MM HG: ICD-10-PCS | Mod: CPTII,,, | Performed by: ORTHOPAEDIC SURGERY

## 2022-09-21 PROCEDURE — 3008F BODY MASS INDEX DOCD: CPT | Mod: CPTII,,, | Performed by: ORTHOPAEDIC SURGERY

## 2022-09-21 PROCEDURE — 3074F SYST BP LT 130 MM HG: CPT | Mod: CPTII,,, | Performed by: ORTHOPAEDIC SURGERY

## 2022-09-21 PROCEDURE — 3008F PR BODY MASS INDEX (BMI) DOCUMENTED: ICD-10-PCS | Mod: CPTII,,, | Performed by: ORTHOPAEDIC SURGERY

## 2022-09-21 PROCEDURE — 3078F DIAST BP <80 MM HG: CPT | Mod: CPTII,,, | Performed by: ORTHOPAEDIC SURGERY

## 2022-09-21 PROCEDURE — 1160F PR REVIEW ALL MEDS BY PRESCRIBER/CLIN PHARMACIST DOCUMENTED: ICD-10-PCS | Mod: CPTII,,, | Performed by: ORTHOPAEDIC SURGERY

## 2022-09-21 RX ORDER — HYDROCODONE BITARTRATE AND ACETAMINOPHEN 5; 325 MG/1; MG/1
1 TABLET ORAL EVERY 12 HOURS PRN
Qty: 10 TABLET | Refills: 0 | Status: ON HOLD | OUTPATIENT
Start: 2022-09-21 | End: 2022-10-17 | Stop reason: HOSPADM

## 2022-09-21 NOTE — PROGRESS NOTES
Subjective:    CC: Follow-up of the Right Shoulder (Right shoulder pain injection helped for a little while.  Doing home exercises.  Wakes her up at night having difficulty bring arm up )       HPI:  Patient returns today for repeat exam.  Patient continues to have pain and loss of motion of her right shoulder.  She states injection and therapy exercises are not working.    ROS: Refer to [facility] [place] for pertinent ROS. All other 12 point systems negative.    Objective:    Physical Exam:  Patient is well-nourished and well-developed, in no apparent distress, pleasant and cooperative. Examination of the right upper extremity compartments are soft and warm.  Skin is intact. There are no signs or symptoms of DVT or infection.   Patient is tender to palpation along the  anterolateral aspect .  Patient is able to forward flex and abduct to 80°.  Positive Atkinson and Neers, positive empty can, questionable drop arm test. Negative sulcus sign. Stable to stressing. Neurovascularly intact distally.    Images: . Images Reviewed and discussed with patient.    Assessment:  1. Shoulder impingement syndrome, right    2. Traumatic complete tear of right rotator cuff, subsequent encounter  - MRI Shoulder Without Contrast Right; Future  - HYDROcodone-acetaminophen (NORCO) 5-325 mg per tablet; Take 1 tablet by mouth every 12 (twelve) hours as needed for Pain.  Dispense: 10 tablet; Refill: 0    3. Acute pain of right shoulder  - MRI Shoulder Without Contrast Right; Future        Plan:  At this time we discussed her physical exam and previous imaging findings.  She has failed multiple conservative treatments for her right shoulder.  We will proceed with an MRI, like to see her back later this week for her results.    Follow UP: No follow-ups on file.

## 2022-10-03 ENCOUNTER — HOSPITAL ENCOUNTER (OUTPATIENT)
Dept: RADIOLOGY | Facility: HOSPITAL | Age: 62
Discharge: HOME | End: 2022-10-03
Payer: MEDICARE

## 2022-10-03 DIAGNOSIS — M25.511 ACUTE PAIN OF RIGHT SHOULDER: ICD-10-CM

## 2022-10-03 DIAGNOSIS — S46.011D TRAUMATIC COMPLETE TEAR OF RIGHT ROTATOR CUFF, SUBSEQUENT ENCOUNTER: ICD-10-CM

## 2022-10-03 PROCEDURE — 73221 MRI JOINT UPR EXTREM W/O DYE: CPT | Mod: TC,RT

## 2022-10-05 ENCOUNTER — OFFICE VISIT (OUTPATIENT)
Dept: ORTHOPEDICS | Facility: CLINIC | Age: 62
End: 2022-10-05
Payer: MEDICARE

## 2022-10-05 VITALS — WEIGHT: 210 LBS | HEIGHT: 67 IN | BODY MASS INDEX: 32.96 KG/M2

## 2022-10-05 DIAGNOSIS — S46.011D TRAUMATIC COMPLETE TEAR OF RIGHT ROTATOR CUFF, SUBSEQUENT ENCOUNTER: Primary | ICD-10-CM

## 2022-10-05 DIAGNOSIS — M75.21 BICIPITAL TENDONITIS OF RIGHT SHOULDER: ICD-10-CM

## 2022-10-05 DIAGNOSIS — M75.41 SHOULDER IMPINGEMENT SYNDROME, RIGHT: ICD-10-CM

## 2022-10-05 DIAGNOSIS — Z01.818 PREOP TESTING: ICD-10-CM

## 2022-10-05 PROCEDURE — 1160F PR REVIEW ALL MEDS BY PRESCRIBER/CLIN PHARMACIST DOCUMENTED: ICD-10-PCS | Mod: CPTII,,, | Performed by: ORTHOPAEDIC SURGERY

## 2022-10-05 PROCEDURE — 99213 OFFICE O/P EST LOW 20 MIN: CPT | Mod: ,,, | Performed by: ORTHOPAEDIC SURGERY

## 2022-10-05 PROCEDURE — 1159F MED LIST DOCD IN RCRD: CPT | Mod: CPTII,,, | Performed by: ORTHOPAEDIC SURGERY

## 2022-10-05 PROCEDURE — 3008F BODY MASS INDEX DOCD: CPT | Mod: CPTII,,, | Performed by: ORTHOPAEDIC SURGERY

## 2022-10-05 PROCEDURE — 1160F RVW MEDS BY RX/DR IN RCRD: CPT | Mod: CPTII,,, | Performed by: ORTHOPAEDIC SURGERY

## 2022-10-05 PROCEDURE — 4010F PR ACE/ARB THEARPY RXD/TAKEN: ICD-10-PCS | Mod: CPTII,,, | Performed by: ORTHOPAEDIC SURGERY

## 2022-10-05 PROCEDURE — 4010F ACE/ARB THERAPY RXD/TAKEN: CPT | Mod: CPTII,,, | Performed by: ORTHOPAEDIC SURGERY

## 2022-10-05 PROCEDURE — 1159F PR MEDICATION LIST DOCUMENTED IN MEDICAL RECORD: ICD-10-PCS | Mod: CPTII,,, | Performed by: ORTHOPAEDIC SURGERY

## 2022-10-05 PROCEDURE — 99213 PR OFFICE/OUTPT VISIT, EST, LEVL III, 20-29 MIN: ICD-10-PCS | Mod: ,,, | Performed by: ORTHOPAEDIC SURGERY

## 2022-10-05 PROCEDURE — 3008F PR BODY MASS INDEX (BMI) DOCUMENTED: ICD-10-PCS | Mod: CPTII,,, | Performed by: ORTHOPAEDIC SURGERY

## 2022-10-05 RX ORDER — SODIUM CHLORIDE 9 MG/ML
INJECTION, SOLUTION INTRAVENOUS CONTINUOUS
Status: CANCELLED | OUTPATIENT
Start: 2022-10-05

## 2022-10-05 NOTE — H&P
Admission History & Physical    Subjective:    CC: Pain of the Right Shoulder (R shoulder MRI results - pt states that the norco 5 is not helping her pain - she states that she has the most pain at night - td)       HPI:  Gayle Gold presents today for preoperative evaluation for right shoulder arthroscopy debridement, mini open rotator cuff repair with subacromial decompression. I reviewed the indications for surgery. The risks and benefits of the proposed and alternative treatments were discussed with the patient. Questions pertinent to the procedure were solicited and answered. Dr. Jay was available to answer any questions with instruction to call clinic with any further questions.No assurances were given. Informed consent was obtained. The patient expressed good understanding and wished to proceed with scheduling the procedure.     ROS:   Constitutional: No fever, weakness, or fatigue.   Ear/Nose/Mouth/Throat: No nasal congestion or sore throat.   Respiratory: No shortness of breath or cough.   Cardiovascular: No chest pain, palpitations, or peripheral edema.   Gastrointestinal: No nausea, vomiting, or abdominal pain.   Genitourinary: No dysuria.  Musculoskeletal:  Right shoulder pain swelling loss of motion    Past Surgical History:   Procedure Laterality Date    HYSTERECTOMY          Past Medical History:   Diagnosis Date    Claustrophobia     Diabetes mellitus, type 2     DVT (deep venous thrombosis)     High cholesterol     Hypertension     Pulmonary embolism         Objective:    There were no vitals filed for this visit.     Physical Exam:    Appearance: No distress, good color on room air. Alert and cooperative.  HEENT: Normocephalic. PERRLA EOM intact.   Lungs: Breathing unlabored.  Heart: Regular rate and rhythm.  Abdomen: Soft, non-tender.  No rebound tenderness.  Extremities:  Right upper extremity compartment soft and warm.  Skin is intact.  There is no signs symptoms of DVT infection.  She  remains be point tender along the anterolateral aspect of the right shoulder positive Atkinson positive empty can sign positive drop-arm she is able to forward flex and abduct 60° of pain and discomfort, neurovascular intact distally.  Skin: No rashes or open wounds.        Assessment:  1. Traumatic complete tear of right rotator cuff, subsequent encounter    2. Shoulder impingement syndrome, right    3. Bicipital tendonitis of right shoulder       Plan:  Plan for right shoulder arthroscopy with debridement mini open rotator cuff repair with subacromial decompression right shoulder at [facility]. The patient has been given preoperative instructions and prescriptions for post-operative medication. Post-operative appointment is scheduled for 2 weeks.

## 2022-10-06 DIAGNOSIS — Z12.31 OTHER SCREENING MAMMOGRAM: Primary | ICD-10-CM

## 2022-10-07 ENCOUNTER — HOSPITAL ENCOUNTER (OUTPATIENT)
Dept: RADIOLOGY | Facility: HOSPITAL | Age: 62
Discharge: HOME | End: 2022-10-07
Attending: FAMILY MEDICINE
Payer: MEDICARE

## 2022-10-07 DIAGNOSIS — Z12.31 OTHER SCREENING MAMMOGRAM: ICD-10-CM

## 2022-10-07 PROCEDURE — 77063 BREAST TOMOSYNTHESIS BI: CPT | Mod: 26,,, | Performed by: RADIOLOGY

## 2022-10-07 PROCEDURE — 77067 SCR MAMMO BI INCL CAD: CPT | Mod: 26,,, | Performed by: RADIOLOGY

## 2022-10-07 PROCEDURE — 77063 MAMMO DIGITAL SCREENING BILAT WITH TOMO: ICD-10-PCS | Mod: 26,,, | Performed by: RADIOLOGY

## 2022-10-07 PROCEDURE — 77067 MAMMO DIGITAL SCREENING BILAT WITH TOMO: ICD-10-PCS | Mod: 26,,, | Performed by: RADIOLOGY

## 2022-10-07 PROCEDURE — 77067 SCR MAMMO BI INCL CAD: CPT | Mod: TC

## 2022-10-10 ENCOUNTER — HOSPITAL ENCOUNTER (OUTPATIENT)
Dept: CARDIOLOGY | Facility: HOSPITAL | Age: 62
Discharge: HOME | End: 2022-10-10
Attending: ORTHOPAEDIC SURGERY
Payer: MEDICARE

## 2022-10-10 ENCOUNTER — HOSPITAL ENCOUNTER (OUTPATIENT)
Dept: PREADMISSION TESTING | Facility: HOSPITAL | Age: 62
Discharge: HOME | End: 2022-10-10
Attending: ORTHOPAEDIC SURGERY
Payer: MEDICARE

## 2022-10-10 DIAGNOSIS — S46.011D TRAUMATIC COMPLETE TEAR OF RIGHT ROTATOR CUFF, SUBSEQUENT ENCOUNTER: ICD-10-CM

## 2022-10-10 DIAGNOSIS — Z01.818 PREOP TESTING: ICD-10-CM

## 2022-10-10 DIAGNOSIS — M75.21 BICIPITAL TENDONITIS OF RIGHT SHOULDER: ICD-10-CM

## 2022-10-10 DIAGNOSIS — M75.41 SHOULDER IMPINGEMENT SYNDROME, RIGHT: ICD-10-CM

## 2022-10-10 PROCEDURE — 93010 ELECTROCARDIOGRAM REPORT: CPT | Mod: ,,, | Performed by: INTERNAL MEDICINE

## 2022-10-10 PROCEDURE — 93010 EKG 12-LEAD: ICD-10-PCS | Mod: ,,, | Performed by: INTERNAL MEDICINE

## 2022-10-10 PROCEDURE — 93041 RHYTHM ECG TRACING: CPT | Mod: 59

## 2022-10-10 PROCEDURE — 93005 ELECTROCARDIOGRAM TRACING: CPT

## 2022-10-11 RX ORDER — DIPHENHYDRAMINE HYDROCHLORIDE 50 MG/ML
12.5 INJECTION INTRAMUSCULAR; INTRAVENOUS ONCE AS NEEDED
Status: CANCELLED | OUTPATIENT
Start: 2022-10-11 | End: 2034-03-09

## 2022-10-11 RX ORDER — FENTANYL CITRATE 50 UG/ML
25 INJECTION, SOLUTION INTRAMUSCULAR; INTRAVENOUS EVERY 5 MIN PRN
Status: CANCELLED | OUTPATIENT
Start: 2022-10-11

## 2022-10-11 RX ORDER — ONDANSETRON 2 MG/ML
4 INJECTION INTRAMUSCULAR; INTRAVENOUS DAILY PRN
Status: CANCELLED | OUTPATIENT
Start: 2022-10-11

## 2022-10-11 RX ORDER — HYDROCODONE BITARTRATE AND ACETAMINOPHEN 5; 325 MG/1; MG/1
1 TABLET ORAL
Status: CANCELLED | OUTPATIENT
Start: 2022-10-11

## 2022-10-11 RX ORDER — SODIUM CHLORIDE 9 MG/ML
INJECTION, SOLUTION INTRAVENOUS CONTINUOUS
Status: CANCELLED | OUTPATIENT
Start: 2022-10-11

## 2022-10-11 RX ORDER — MEPERIDINE HYDROCHLORIDE 25 MG/ML
12.5 INJECTION INTRAMUSCULAR; INTRAVENOUS; SUBCUTANEOUS ONCE AS NEEDED
Status: CANCELLED | OUTPATIENT
Start: 2022-10-11 | End: 2022-10-12

## 2022-10-12 ENCOUNTER — HOSPITAL ENCOUNTER (OUTPATIENT)
Facility: HOSPITAL | Age: 62
Discharge: HOME | End: 2022-10-12
Attending: ORTHOPAEDIC SURGERY | Admitting: ORTHOPAEDIC SURGERY
Payer: MEDICARE

## 2022-10-12 ENCOUNTER — ANESTHESIA EVENT (OUTPATIENT)
Dept: SURGERY | Facility: HOSPITAL | Age: 62
End: 2022-10-12
Payer: MEDICARE

## 2022-10-12 VITALS
HEART RATE: 62 BPM | WEIGHT: 212.5 LBS | OXYGEN SATURATION: 99 % | SYSTOLIC BLOOD PRESSURE: 124 MMHG | DIASTOLIC BLOOD PRESSURE: 79 MMHG | RESPIRATION RATE: 18 BRPM | HEIGHT: 67 IN | TEMPERATURE: 97 F | BODY MASS INDEX: 33.35 KG/M2

## 2022-10-12 DIAGNOSIS — M75.21 BICIPITAL TENDONITIS OF RIGHT SHOULDER: ICD-10-CM

## 2022-10-12 DIAGNOSIS — M75.41 SHOULDER IMPINGEMENT SYNDROME, RIGHT: ICD-10-CM

## 2022-10-12 DIAGNOSIS — Z01.818 PREOP TESTING: ICD-10-CM

## 2022-10-12 DIAGNOSIS — S46.011D TRAUMATIC COMPLETE TEAR OF RIGHT ROTATOR CUFF, SUBSEQUENT ENCOUNTER: ICD-10-CM

## 2022-10-12 LAB — POCT GLUCOSE: 194 MG/DL (ref 70–110)

## 2022-10-12 PROCEDURE — 99499 UNLISTED E&M SERVICE: CPT | Mod: ,,, | Performed by: ORTHOPAEDIC SURGERY

## 2022-10-12 PROCEDURE — 63600175 PHARM REV CODE 636 W HCPCS: Performed by: ORTHOPAEDIC SURGERY

## 2022-10-12 PROCEDURE — 25000003 PHARM REV CODE 250: Performed by: ORTHOPAEDIC SURGERY

## 2022-10-12 PROCEDURE — 63600175 PHARM REV CODE 636 W HCPCS

## 2022-10-12 PROCEDURE — 99499 NO LOS: ICD-10-PCS | Mod: ,,, | Performed by: ORTHOPAEDIC SURGERY

## 2022-10-12 RX ORDER — FENTANYL CITRATE 50 UG/ML
INJECTION, SOLUTION INTRAMUSCULAR; INTRAVENOUS
Status: COMPLETED
Start: 2022-10-12 | End: 2022-10-12

## 2022-10-12 RX ORDER — SODIUM CHLORIDE 9 MG/ML
INJECTION, SOLUTION INTRAVENOUS CONTINUOUS
Status: ACTIVE | OUTPATIENT
Start: 2022-10-12

## 2022-10-12 RX ORDER — EPINEPHRINE 1 MG/ML
INJECTION, SOLUTION, CONCENTRATE INTRAVENOUS
Status: DISCONTINUED
Start: 2022-10-12 | End: 2022-10-12 | Stop reason: WASHOUT

## 2022-10-12 RX ORDER — ROPIVACAINE HYDROCHLORIDE 5 MG/ML
30 INJECTION, SOLUTION EPIDURAL; INFILTRATION; PERINEURAL ONCE
Status: ACTIVE | OUTPATIENT
Start: 2022-10-12

## 2022-10-12 RX ORDER — CEFAZOLIN 2 G/1
INJECTION, POWDER, FOR SOLUTION INTRAMUSCULAR; INTRAVENOUS
Status: DISCONTINUED
Start: 2022-10-12 | End: 2022-10-12 | Stop reason: WASHOUT

## 2022-10-12 RX ORDER — SODIUM CHLORIDE 9 MG/ML
INJECTION, SOLUTION INTRAVENOUS CONTINUOUS
Status: DISCONTINUED | OUTPATIENT
Start: 2022-10-12 | End: 2022-10-12 | Stop reason: HOSPADM

## 2022-10-12 RX ORDER — MIDAZOLAM HYDROCHLORIDE 1 MG/ML
INJECTION INTRAMUSCULAR; INTRAVENOUS
Status: DISCONTINUED
Start: 2022-10-12 | End: 2022-10-12 | Stop reason: WASHOUT

## 2022-10-12 RX ORDER — MIDAZOLAM HYDROCHLORIDE 1 MG/ML
2 INJECTION INTRAMUSCULAR; INTRAVENOUS
Status: ACTIVE | OUTPATIENT
Start: 2022-10-12 | End: 2022-10-12

## 2022-10-12 RX ORDER — FENTANYL CITRATE 50 UG/ML
50 INJECTION, SOLUTION INTRAMUSCULAR; INTRAVENOUS
Status: ACTIVE | OUTPATIENT
Start: 2022-10-12

## 2022-10-12 RX ORDER — EPINEPHRINE CONVENIENCE KIT 1 MG/ML(1)
KIT INTRAMUSCULAR; SUBCUTANEOUS
Status: DISCONTINUED | OUTPATIENT
Start: 2022-10-12 | End: 2022-10-12 | Stop reason: HOSPADM

## 2022-10-12 RX ORDER — MIDAZOLAM HYDROCHLORIDE 1 MG/ML
1 INJECTION INTRAMUSCULAR; INTRAVENOUS
Status: ACTIVE | OUTPATIENT
Start: 2022-10-12 | End: 2022-10-12

## 2022-10-12 RX ORDER — ROPIVACAINE HYDROCHLORIDE 5 MG/ML
INJECTION, SOLUTION EPIDURAL; INFILTRATION; PERINEURAL
Status: DISCONTINUED
Start: 2022-10-12 | End: 2022-10-12 | Stop reason: WASHOUT

## 2022-10-12 RX ORDER — FENTANYL CITRATE 50 UG/ML
25 INJECTION, SOLUTION INTRAMUSCULAR; INTRAVENOUS
Status: ACTIVE | OUTPATIENT
Start: 2022-10-12 | End: 2022-10-12

## 2022-10-12 RX ADMIN — SODIUM CHLORIDE: 9 INJECTION, SOLUTION INTRAVENOUS at 07:10

## 2022-10-12 RX ADMIN — FENTANYL CITRATE 50 MCG: 50 INJECTION, SOLUTION INTRAMUSCULAR; INTRAVENOUS at 08:10

## 2022-10-12 NOTE — PLAN OF CARE
Patient cancelled per Dr. Jay office for cardiac clearance.     Dr. Thakkar office aware they need to see patient and will call and set up appointment.     Dr. Li office made aware to prescribe medication for DVT prevention.

## 2022-10-12 NOTE — PLAN OF CARE
Case cancelled per Dr. Jay office. Supraclavicular block was not performed.RADHA Mccarty CRNA spoke with patient . Outpatient surgery notified.

## 2022-10-12 NOTE — PROGRESS NOTES
Surgery has been canceled, cardiac clearance is still pending.  We will reschedule her surgery upon clearance.

## 2022-10-13 ENCOUNTER — ANTI-COAG VISIT (OUTPATIENT)
Dept: CARDIOLOGY | Facility: HOSPITAL | Age: 62
End: 2022-10-13
Payer: MEDICARE

## 2022-10-13 DIAGNOSIS — Z86.718 PERSONAL HISTORY OF DVT (DEEP VEIN THROMBOSIS): ICD-10-CM

## 2022-10-13 DIAGNOSIS — Z86.711 PERSONAL HISTORY OF PE (PULMONARY EMBOLISM): ICD-10-CM

## 2022-10-13 LAB — INR PPP: 1 (ref 2–3)

## 2022-10-13 PROCEDURE — 99211 OFF/OP EST MAY X REQ PHY/QHP: CPT

## 2022-10-13 PROCEDURE — 85610 PROTHROMBIN TIME: CPT

## 2022-10-13 NOTE — ANESTHESIA PREPROCEDURE EVALUATION
10/13/2022  Gayle Gold is a 62 y.o., female.      Pre-op Assessment    I have reviewed the Patient Summary Reports.     I have reviewed the Nursing Notes. I have reviewed the NPO Status.   I have reviewed the Medications.     Review of Systems  Anesthesia Hx:  No problems with previous Anesthesia  History of prior surgery of interest to airway management or planning:   Social:  Smoker, Social Alcohol Use    Hematology/Oncology:  Hematology Normal   Oncology Normal     EENT/Dental:EENT/Dental Normal   Cardiovascular:   Exercise tolerance: good Hypertension ECG has been reviewed.  Hypertension, Essential Hypertension    Pulmonary:   History of pulmonary embolism Pulmonary Symptoms:  Denies Lung/Chest Surgery or Procedure.  Pulmonary Embolism, > 6 months ago, currently on warfarin tx    Renal/:  Renal/ Normal     Hepatic/GI:  Hepatic/GI Normal    Musculoskeletal:   Right shoulder rotator cuff tear Musculoskeletal General/Symptoms: joint pain. Functional capacity is ambulatory without assistance. Right shoulder rotator cuff tear   Neurological:  Neurology Normal    Endocrine:   Diabetes, type 2  Diabetes, Type 2 Diabetes    Dermatological:  Skin Normal    Psych:  Psychiatric Normal   Phobia and Claustrophobia.         Physical Exam  General: Well nourished, Cooperative, Alert and Oriented    Airway:  Mallampati: II   Mouth Opening: Normal  TM Distance: Normal  Tongue: Normal  Neck ROM: Normal ROM    Dental:  Dentures  Upper and lower dentures  Chest/Lungs:  Clear to auscultation, Normal Respiratory Rate    Heart:  Rate: Normal  Rhythm: Regular Rhythm        Anesthesia Plan  Type of Anesthesia, risks & benefits discussed:    Anesthesia Type: Gen ETT, Regional  Intra-op Monitoring Plan: Standard ASA Monitors  Post Op Pain Control Plan: multimodal analgesia and IV/PO Opioids PRN  Induction:  IV  Airway  Plan: Direct  Informed Consent: Informed consent signed with the Patient and all parties understand the risks and agree with anesthesia plan.  All questions answered. Patient consented to blood products? No  ASA Score: 3  Day of Surgery Review of History & Physical: H&P Update referred to the surgeon/provider.I have interviewed and examined the patient. I have reviewed the patient's H&P dated: There are no significant changes.   Anesthesia Plan Notes: Last Coumadin 10 days ago (patient took herself off) without Lovenox bridge  Started on Lovenox 10/12/22  Last Lovenox 10/16/22 @ 1730 hrs    Ready For Surgery From Anesthesia Perspective.     .

## 2022-10-17 ENCOUNTER — HOSPITAL ENCOUNTER (OUTPATIENT)
Facility: HOSPITAL | Age: 62
Discharge: HOME | End: 2022-10-17
Attending: ORTHOPAEDIC SURGERY | Admitting: ORTHOPAEDIC SURGERY
Payer: MEDICARE

## 2022-10-17 ENCOUNTER — ANESTHESIA (OUTPATIENT)
Dept: SURGERY | Facility: HOSPITAL | Age: 62
End: 2022-10-17
Payer: MEDICARE

## 2022-10-17 VITALS
BODY MASS INDEX: 33.19 KG/M2 | HEIGHT: 67 IN | RESPIRATION RATE: 20 BRPM | WEIGHT: 211.44 LBS | OXYGEN SATURATION: 99 % | DIASTOLIC BLOOD PRESSURE: 68 MMHG | TEMPERATURE: 98 F | SYSTOLIC BLOOD PRESSURE: 119 MMHG | HEART RATE: 82 BPM

## 2022-10-17 DIAGNOSIS — S46.011D TRAUMATIC COMPLETE TEAR OF RIGHT ROTATOR CUFF, SUBSEQUENT ENCOUNTER: Primary | ICD-10-CM

## 2022-10-17 PROBLEM — S46.011A TRAUMATIC COMPLETE TEAR OF RIGHT ROTATOR CUFF: Status: ACTIVE | Noted: 2022-10-17

## 2022-10-17 LAB
POCT GLUCOSE: 112 MG/DL (ref 70–110)
POCT GLUCOSE: 175 MG/DL (ref 70–110)

## 2022-10-17 PROCEDURE — 37000009 HC ANESTHESIA EA ADD 15 MINS: Performed by: ORTHOPAEDIC SURGERY

## 2022-10-17 PROCEDURE — 23130 ACROMP/ACROMIONECTOMY PRTL: CPT | Mod: 59,51,RT, | Performed by: ORTHOPAEDIC SURGERY

## 2022-10-17 PROCEDURE — 71000033 HC RECOVERY, INTIAL HOUR: Performed by: ORTHOPAEDIC SURGERY

## 2022-10-17 PROCEDURE — 63600175 PHARM REV CODE 636 W HCPCS: Performed by: ORTHOPAEDIC SURGERY

## 2022-10-17 PROCEDURE — 63600175 PHARM REV CODE 636 W HCPCS

## 2022-10-17 PROCEDURE — 23412 PR REPAIR ROTATOR CUFF,CHRONIC: ICD-10-PCS | Mod: RT,,, | Performed by: ORTHOPAEDIC SURGERY

## 2022-10-17 PROCEDURE — C1889 IMPLANT/INSERT DEVICE, NOC: HCPCS | Performed by: ORTHOPAEDIC SURGERY

## 2022-10-17 PROCEDURE — C1713 ANCHOR/SCREW BN/BN,TIS/BN: HCPCS | Performed by: ORTHOPAEDIC SURGERY

## 2022-10-17 PROCEDURE — 23130 PR PARTIAL REMOVAL/REPAIR,ACROMION: ICD-10-PCS | Mod: 59,51,RT, | Performed by: ORTHOPAEDIC SURGERY

## 2022-10-17 PROCEDURE — 23412 PR REPAIR ROTATOR CUFF,CHRONIC: ICD-10-PCS | Mod: AS,RT,,

## 2022-10-17 PROCEDURE — 36000710: Performed by: ORTHOPAEDIC SURGERY

## 2022-10-17 PROCEDURE — 23412 REPAIR ROTATOR CUFF CHRONIC: CPT | Mod: AS,RT,,

## 2022-10-17 PROCEDURE — 36000711: Performed by: ORTHOPAEDIC SURGERY

## 2022-10-17 PROCEDURE — 23412 REPAIR ROTATOR CUFF CHRONIC: CPT | Mod: RT,,, | Performed by: ORTHOPAEDIC SURGERY

## 2022-10-17 PROCEDURE — 71000016 HC POSTOP RECOV ADDL HR: Performed by: ORTHOPAEDIC SURGERY

## 2022-10-17 PROCEDURE — 71000015 HC POSTOP RECOV 1ST HR: Performed by: ORTHOPAEDIC SURGERY

## 2022-10-17 PROCEDURE — 63600175 PHARM REV CODE 636 W HCPCS: Performed by: NURSE ANESTHETIST, CERTIFIED REGISTERED

## 2022-10-17 PROCEDURE — 25000003 PHARM REV CODE 250: Performed by: NURSE ANESTHETIST, CERTIFIED REGISTERED

## 2022-10-17 PROCEDURE — 27201423 OPTIME MED/SURG SUP & DEVICES STERILE SUPPLY: Performed by: ORTHOPAEDIC SURGERY

## 2022-10-17 PROCEDURE — 37000008 HC ANESTHESIA 1ST 15 MINUTES: Performed by: ORTHOPAEDIC SURGERY

## 2022-10-17 DEVICE — ANCHOR SUT BIOCOM 5.5X19.1MM: Type: IMPLANTABLE DEVICE | Site: SHOULDER | Status: FUNCTIONAL

## 2022-10-17 RX ORDER — ROCURONIUM BROMIDE 10 MG/ML
INJECTION, SOLUTION INTRAVENOUS
Status: DISCONTINUED | OUTPATIENT
Start: 2022-10-17 | End: 2022-10-17

## 2022-10-17 RX ORDER — FENTANYL CITRATE 50 UG/ML
25 INJECTION, SOLUTION INTRAMUSCULAR; INTRAVENOUS EVERY 5 MIN PRN
Status: DISCONTINUED | OUTPATIENT
Start: 2022-10-17 | End: 2022-10-17 | Stop reason: HOSPADM

## 2022-10-17 RX ORDER — HYDROCODONE BITARTRATE AND ACETAMINOPHEN 7.5; 325 MG/1; MG/1
1 TABLET ORAL EVERY 8 HOURS PRN
Qty: 21 TABLET | Refills: 0 | Status: SHIPPED | OUTPATIENT
Start: 2022-10-17 | End: 2022-10-24 | Stop reason: SDUPTHER

## 2022-10-17 RX ORDER — ONDANSETRON 2 MG/ML
4 INJECTION INTRAMUSCULAR; INTRAVENOUS EVERY 6 HOURS PRN
Status: DISCONTINUED | OUTPATIENT
Start: 2022-10-17 | End: 2022-10-17 | Stop reason: HOSPADM

## 2022-10-17 RX ORDER — METOCLOPRAMIDE HYDROCHLORIDE 5 MG/ML
10 INJECTION INTRAMUSCULAR; INTRAVENOUS EVERY 6 HOURS PRN
Status: DISCONTINUED | OUTPATIENT
Start: 2022-10-17 | End: 2022-10-17 | Stop reason: HOSPADM

## 2022-10-17 RX ORDER — ROPIVACAINE HYDROCHLORIDE 5 MG/ML
INJECTION, SOLUTION EPIDURAL; INFILTRATION; PERINEURAL
Status: COMPLETED
Start: 2022-10-17 | End: 2022-10-17

## 2022-10-17 RX ORDER — FENTANYL CITRATE 50 UG/ML
25 INJECTION, SOLUTION INTRAMUSCULAR; INTRAVENOUS
Status: ACTIVE | OUTPATIENT
Start: 2022-10-17 | End: 2022-10-17

## 2022-10-17 RX ORDER — MIDAZOLAM HYDROCHLORIDE 1 MG/ML
INJECTION INTRAMUSCULAR; INTRAVENOUS
Status: COMPLETED
Start: 2022-10-17 | End: 2022-10-17

## 2022-10-17 RX ORDER — MAG HYDROX/ALUMINUM HYD/SIMETH 200-200-20
30 SUSPENSION, ORAL (FINAL DOSE FORM) ORAL EVERY 6 HOURS PRN
Status: DISCONTINUED | OUTPATIENT
Start: 2022-10-17 | End: 2022-10-17 | Stop reason: HOSPADM

## 2022-10-17 RX ORDER — PROPOFOL 10 MG/ML
VIAL (ML) INTRAVENOUS
Status: DISCONTINUED | OUTPATIENT
Start: 2022-10-17 | End: 2022-10-17

## 2022-10-17 RX ORDER — HYDROCODONE BITARTRATE AND ACETAMINOPHEN 5; 325 MG/1; MG/1
1 TABLET ORAL
Status: DISCONTINUED | OUTPATIENT
Start: 2022-10-17 | End: 2022-10-17 | Stop reason: HOSPADM

## 2022-10-17 RX ORDER — EPHEDRINE SULFATE 50 MG/ML
INJECTION, SOLUTION INTRAVENOUS
Status: DISCONTINUED | OUTPATIENT
Start: 2022-10-17 | End: 2022-10-17

## 2022-10-17 RX ORDER — ROPIVACAINE HYDROCHLORIDE 5 MG/ML
INJECTION, SOLUTION EPIDURAL; INFILTRATION; PERINEURAL
Status: COMPLETED | OUTPATIENT
Start: 2022-10-17 | End: 2022-10-17

## 2022-10-17 RX ORDER — FENTANYL CITRATE 50 UG/ML
50 INJECTION, SOLUTION INTRAMUSCULAR; INTRAVENOUS
Status: ACTIVE | OUTPATIENT
Start: 2022-10-17

## 2022-10-17 RX ORDER — EPINEPHRINE CONVENIENCE KIT 1 MG/ML(1)
KIT INTRAMUSCULAR; SUBCUTANEOUS
Status: DISCONTINUED | OUTPATIENT
Start: 2022-10-17 | End: 2022-10-17 | Stop reason: HOSPADM

## 2022-10-17 RX ORDER — SODIUM CHLORIDE 9 MG/ML
INJECTION, SOLUTION INTRAVENOUS CONTINUOUS
Status: DISCONTINUED | OUTPATIENT
Start: 2022-10-17 | End: 2022-10-17 | Stop reason: HOSPADM

## 2022-10-17 RX ORDER — METHOCARBAMOL 500 MG/1
500 TABLET, FILM COATED ORAL EVERY 6 HOURS PRN
Status: DISCONTINUED | OUTPATIENT
Start: 2022-10-17 | End: 2022-10-17 | Stop reason: HOSPADM

## 2022-10-17 RX ORDER — FENTANYL CITRATE 50 UG/ML
INJECTION, SOLUTION INTRAMUSCULAR; INTRAVENOUS
Status: DISCONTINUED
Start: 2022-10-17 | End: 2022-10-17 | Stop reason: WASHOUT

## 2022-10-17 RX ORDER — CLINDAMYCIN PHOSPHATE 900 MG/50ML
INJECTION, SOLUTION INTRAVENOUS
Status: COMPLETED
Start: 2022-10-17 | End: 2022-10-17

## 2022-10-17 RX ORDER — DIPHENHYDRAMINE HYDROCHLORIDE 50 MG/ML
12.5 INJECTION INTRAMUSCULAR; INTRAVENOUS ONCE AS NEEDED
Status: DISCONTINUED | OUTPATIENT
Start: 2022-10-17 | End: 2022-10-17 | Stop reason: HOSPADM

## 2022-10-17 RX ORDER — MIDAZOLAM HYDROCHLORIDE 1 MG/ML
2 INJECTION INTRAMUSCULAR; INTRAVENOUS
Status: ACTIVE | OUTPATIENT
Start: 2022-10-17 | End: 2022-10-17

## 2022-10-17 RX ORDER — SODIUM CHLORIDE 9 MG/ML
INJECTION, SOLUTION INTRAVENOUS CONTINUOUS
Status: ACTIVE | OUTPATIENT
Start: 2022-10-17

## 2022-10-17 RX ORDER — MIDAZOLAM HYDROCHLORIDE 1 MG/ML
1 INJECTION INTRAMUSCULAR; INTRAVENOUS
Status: ACTIVE | OUTPATIENT
Start: 2022-10-17 | End: 2022-10-17

## 2022-10-17 RX ORDER — EPINEPHRINE 1 MG/ML
INJECTION, SOLUTION, CONCENTRATE INTRAVENOUS
Status: DISCONTINUED
Start: 2022-10-17 | End: 2022-10-17 | Stop reason: HOSPADM

## 2022-10-17 RX ORDER — CALCIUM CARBONATE 200(500)MG
500 TABLET,CHEWABLE ORAL 3 TIMES DAILY PRN
Status: DISCONTINUED | OUTPATIENT
Start: 2022-10-17 | End: 2022-10-17 | Stop reason: HOSPADM

## 2022-10-17 RX ORDER — ROPIVACAINE HYDROCHLORIDE 5 MG/ML
30 INJECTION, SOLUTION EPIDURAL; INFILTRATION; PERINEURAL ONCE
Status: COMPLETED | OUTPATIENT
Start: 2022-10-17 | End: 2022-10-17

## 2022-10-17 RX ORDER — ONDANSETRON 2 MG/ML
4 INJECTION INTRAMUSCULAR; INTRAVENOUS DAILY PRN
Status: DISCONTINUED | OUTPATIENT
Start: 2022-10-17 | End: 2022-10-17 | Stop reason: HOSPADM

## 2022-10-17 RX ORDER — LIDOCAINE HYDROCHLORIDE 20 MG/ML
INJECTION INTRAVENOUS
Status: DISCONTINUED | OUTPATIENT
Start: 2022-10-17 | End: 2022-10-17

## 2022-10-17 RX ORDER — ENOXAPARIN SODIUM 100 MG/ML
100 INJECTION SUBCUTANEOUS 2 TIMES DAILY
COMMUNITY
Start: 2022-10-13

## 2022-10-17 RX ORDER — CLINDAMYCIN PHOSPHATE 900 MG/50ML
INJECTION, SOLUTION INTRAVENOUS
Status: DISCONTINUED | OUTPATIENT
Start: 2022-10-17 | End: 2022-10-17

## 2022-10-17 RX ORDER — ONDANSETRON 2 MG/ML
INJECTION INTRAMUSCULAR; INTRAVENOUS
Status: DISCONTINUED | OUTPATIENT
Start: 2022-10-17 | End: 2022-10-17

## 2022-10-17 RX ORDER — MEPERIDINE HYDROCHLORIDE 25 MG/ML
12.5 INJECTION INTRAMUSCULAR; INTRAVENOUS; SUBCUTANEOUS ONCE AS NEEDED
Status: DISCONTINUED | OUTPATIENT
Start: 2022-10-17 | End: 2022-10-17 | Stop reason: HOSPADM

## 2022-10-17 RX ADMIN — ROPIVACAINE HYDROCHLORIDE 30 ML: 5 INJECTION, SOLUTION EPIDURAL; INFILTRATION; PERINEURAL at 10:10

## 2022-10-17 RX ADMIN — EPHEDRINE SULFATE 15 MG: 50 INJECTION INTRAVENOUS at 12:10

## 2022-10-17 RX ADMIN — LIDOCAINE HYDROCHLORIDE 50 MG: 20 INJECTION, SOLUTION INTRAVENOUS at 12:10

## 2022-10-17 RX ADMIN — SODIUM CHLORIDE: 9 INJECTION, SOLUTION INTRAVENOUS at 11:10

## 2022-10-17 RX ADMIN — MIDAZOLAM HYDROCHLORIDE 2 MG: 1 INJECTION, SOLUTION INTRAMUSCULAR; INTRAVENOUS at 10:10

## 2022-10-17 RX ADMIN — ROCURONIUM BROMIDE 25 MG: 10 SOLUTION INTRAVENOUS at 12:10

## 2022-10-17 RX ADMIN — ONDANSETRON 4 MG: 2 INJECTION INTRAMUSCULAR; INTRAVENOUS at 12:10

## 2022-10-17 RX ADMIN — CLINDAMYCIN PHOSPHATE 900 MG: 900 INJECTION, SOLUTION INTRAVENOUS at 12:10

## 2022-10-17 RX ADMIN — MIDAZOLAM HYDROCHLORIDE 2 MG: 1 INJECTION INTRAMUSCULAR; INTRAVENOUS at 10:10

## 2022-10-17 RX ADMIN — EPHEDRINE SULFATE 10 MG: 50 INJECTION INTRAVENOUS at 12:10

## 2022-10-17 RX ADMIN — SUGAMMADEX 100 MG: 100 INJECTION, SOLUTION INTRAVENOUS at 01:10

## 2022-10-17 RX ADMIN — PROPOFOL 150 MG: 10 INJECTION, EMULSION INTRAVENOUS at 12:10

## 2022-10-17 NOTE — ANESTHESIA PROCEDURE NOTES
Intubation    Date/Time: 10/17/2022 12:08 PM  Performed by: Regan Mccarty CRNA  Authorized by: Regan Mccarty CRNA     Intubation:     Induction:  Intravenous    Intubated:  Postinduction    Mask Ventilation:  Easy mask    Attempts:  1    Attempted By:  CRNA    Method of Intubation:  Direct    Blade:  Vicente 2    Laryngeal View Grade: Grade IIA - cords partially seen      Difficult Airway Encountered?: No      Complications:  None    Airway Device:  Oral endotracheal tube    Airway Device Size:  7.0    Style/Cuff Inflation:  Cuffed    Inflation Amount (mL):  8    Tube secured:  21    Secured at:  The lips    Placement Verified By:  Capnometry    Complicating Factors:  None    Findings Post-Intubation:  BS equal bilateral and atraumatic/condition of teeth unchanged

## 2022-10-17 NOTE — ANESTHESIA PROCEDURE NOTES
Peripheral Block    Patient location during procedure: holding area    Reason for block: primary anesthetic    Diagnosis: Right shoulder rotator cuff tear   Start time: 10/17/2022 10:05 AM  Timeout: 10/17/2022 9:50 AM   End time: 10/17/2022 10:08 AM    Staffing  Authorizing Provider: Regan Mccarty CRNA  Performing Provider: Regan Mccarty CRNA    Preanesthetic Checklist  Completed: patient identified, IV checked, site marked, risks and benefits discussed, surgical consent, monitors and equipment checked, pre-op evaluation and timeout performed  Peripheral Block  Patient position: supine  Prep: ChloraPrep and site prepped and draped  Patient monitoring: heart rate, cardiac monitor, continuous pulse ox, continuous capnometry and frequent blood pressure checks  Block type: interscalene  Laterality: right  Injection technique: single shot  Needle  Needle type: Stimuplex   Needle gauge: 20 G  Needle localization: anatomical landmarks, nerve stimulator, paresthesias and ultrasound guidance  Catheter type: stimulating  Catheter size: 20 G     Assessment  Injection assessment: negative aspiration, negative parasthesia and local visualized surrounding nerve  Paresthesia pain: none  Heart rate change: no  Slow fractionated injection: yes  Pain Tolerance: comfortable throughout block and no complaints  Medications:    Medications: ropivacaine (NAROPIN) injection 0.5% - Perineural   30 mL - 10/17/2022 10:08:00 AM

## 2022-10-17 NOTE — OP NOTE
DATE OF SURGERY:    10/17/2022      SURGEON:  Freddie Jay MD    ASSISTANT: LIGIA Cherry    ASSISTANT ATTESTATION: PA was essential throughout key and critical portions of the case including soft tissue retraction, shoulder manipulation, implant fixation, as well as primary closure of multiple layered wound.    HOSPITAL:  [facility]     SERVICE:  Orthopedics      PREOPERATIVE DIAGNOSIS:  Right shoulder full-thickness retracted rotator cuff tear subacromial impingement labral tearing      POSTOPERATIVE DIAGNOSIS:  Same as above.      PROCEDURE:  1. Right shoulder arthroscopy with debridement 2. Right shoulder mini open full-thickness retracted rotator cuff repair 3. Right shoulder mini open subacromial decompression      ANESTHESIA:  LMA.      IV FLUIDS:  See Anesthesia.      ESTIMATED BLOOD LOSS:  Less than 30 cc.      COUNTS:  Correct.      COMPLICATIONS:  None.      IMPLANTS:    Implant Name Type Inv. Item Serial No.  Lot No. LRB No. Used Action   ANCHOR SUT BIOCOM 5.5X19.1MM - [id number] [place] ANCHOR SUT BIOCOM 5.5X19.1MM  [employer] [id number] Right 1 Implanted         DISPOSITION:  Stable to PACU.      INDICATIONS FOR PROCEDURE: Gayle Gold is a 62 y.o. old female  with continued pain and loss of motion of the right shoulder.  The patient has failed multiple conservative treatments. Risks, benefits, and alternatives discussed with the patient as well as patient's family in detail.  All questions were answered.  Informed consent was obtained.      PROCEDURE IN DETAIL: The patient was found in preoperative holding by Anesthesia and found fit for surgery.  Patient was taken to the operating room and placed on the operating table in supine position.  All bony prominences were well padded.  Time-out was called to identify correct patient, correct procedure, and correct site, and all were in agreement.  The patient underwent LMA anesthesia without complications.  The patient was then prepped and  draped in normal sterile fashion, leaving the right upper extremity exposed for surgery.  The patient was in the modified beach chair position.  Preoperative antibiotics wer given. Next, through the posterior portal with good backflow, a 1 cm incision was made.  A camera was introduced into the glenohumeral joint.  After this was done, the anterior portal was then made through an incision just lateral to the tip of the coracoid.  Next, examination of the glenohumeral joint demonstrated fraying of the anterior labrum as well as proximal biceps tendon anchor.  With use of 3.5 shaver.  Patient when a debridement of this area.  He was otherwise stable, her biceps tendon was and had no gross instability.  It tracked appropriate in the groove.  Attention was turned inferior, there is no loose bodies the remaining labrum was intact.  Attention was turned to the glenohumeral joint where patient did have some degenerative changes both in the glenoid and humeral head side.  There was no exposed bone.  Camera was  then introduced more superior and lateral where patient was noted to have a full-thickness retracted rotator cuff tear.  The more inferior rotator cuff was intact.  Given this a 3 cm anterolateral incision was made over the right shoulder soft tissue dissection was taken down the fascia were split in line with its fibers.  Next entering the subacromial space a large amount of bursal tissue was resected.  She had scraping of the undersurface of the acromion.  After the subacromial decompression the rotator cuff was identified.  She did have freshening of the insertion area with use of rongeur.  It was then repaired back over the humeral head and brought laterally, securing the rotator cuff with the Arthrex anchor..  After this was done, hemostasis was achieved.  Copious irrigation was used to wash the wound.  The fascia was closed with 0 Vicryl, subcutaneous tissues closed with 2-0 Vicryl, skin was closed with 3-0  Monocryl sutures.  Mastisol, Steri-Strips, Xeroform, 4 x 4's, soft tissue dressing, and a shoulder abduction sling were placed on the affected upper extremity.  The patient was awoken by Anesthesia and brought to PACU in stable condition.    ______________________________  Freddie Jay MD

## 2022-10-17 NOTE — PLAN OF CARE
Vitals signs stable. Pain and nausea well controlled. Discharge criteria/Ella  met for transfer to phase II per  RADHA Mccarty CRNA

## 2022-10-17 NOTE — ANESTHESIA POSTPROCEDURE EVALUATION
Anesthesia Post Evaluation    Patient: Gayle Gold    Procedure(s) Performed: Procedure(s) (LRB):  REPAIR, ROTATOR CUFF, ARTHROSCOPIC (Right)    Final Anesthesia Type: general      Patient location during evaluation: PACU  Patient participation: Yes- Able to Participate  Level of consciousness: awake and alert and oriented  Post-procedure vital signs: reviewed and stable  Pain management: adequate  Airway patency: patent    PONV status at discharge: No PONV  Anesthetic complications: no      Cardiovascular status: stable  Respiratory status: unassisted, room air and spontaneous ventilation  Hydration status: euvolemic  Follow-up not needed.          Vitals Value Taken Time   /68 10/17/22 1410   Temp 36.5 °C (97.7 °F) 10/17/22 1312   Pulse 88 10/17/22 1415   Resp 17 10/17/22 1414   SpO2 95 % 10/17/22 1415   Vitals shown include unvalidated device data.      No case tracking events are documented in the log.      Pain/Ella Score: Ella Score: 10 (10/17/2022  2:05 PM)

## 2022-10-17 NOTE — BRIEF OP NOTE
[facility] - Periop Services  Brief Operative Note    Surgery Date: 10/17/2022     Surgeon(s) and Role:     * Freddie Jay MD - Primary    Assisting: LIGIA PERRY    Pre-op Diagnosis:  Traumatic complete tear of right rotator cuff, subsequent encounter [S46.011D]  Shoulder impingement syndrome, right [M75.41]  Bicipital tendonitis of right shoulder [M75.21]  Preop testing [Z01.818]    Post-op Diagnosis:  Post-Op Diagnosis Codes:     * Traumatic complete tear of right rotator cuff, subsequent encounter [S46.011D]     * Shoulder impingement syndrome, right [M75.41]     * Bicipital tendonitis of right shoulder [M75.21]     * Preop testing [Z01.818]  Partial labral tear    Procedure(s) (LRB):  REPAIR, ROTATOR CUFF, ARTHROSCOPIC (Right), labral debridement     Anesthesia: General/regional    Operative Findings: see op report    Estimated Blood Loss: <10cc         Specimens:   Specimen (24h ago, onward)      None              Discharge Note    OUTCOME: Patient tolerated treatment/procedure well without complication and is now ready for discharge.    DISPOSITION: Home or Self Care    FINAL DIAGNOSIS:  Traumatic complete tear of right rotator cuff    FOLLOWUP: In clinic    DISCHARGE INSTRUCTIONS:    Discharge Procedure Orders   Diet general     Ice to affected area     Lifting restrictions     No driving, operating heavy equipment or signing legal documents while taking pain medication.     Other restrictions (specify):   Order Comments: Ok to come out of sling for pendulum exercises, otherwise continue abduction sling. No heavy lifting.     Remove dressing in 72 hours     Wound care routine (specify)   Order Comments: Wound care routine: keep dressing clean, dry, and intact. Ok to remove in 3 days and shower. No submersion.     Call MD for:  temperature >100.4     Call MD for:  persistent nausea and vomiting     Call MD for:  severe uncontrolled pain     Call MD for:  difficulty breathing, headache or visual  disturbances     Call MD for:  redness, tenderness, or signs of infection (pain, swelling, redness, odor or green/yellow discharge around incision site)     Call MD for:  hives     Call MD for:  persistent dizziness or light-headedness     Call MD for:  extreme fatigue     Shower on day dressing removed (No bath)        Detail Level: Zone

## 2022-10-17 NOTE — TRANSFER OF CARE
"Anesthesia Transfer of Care Note    Patient: Gayle Gold    Procedure(s) Performed: Procedure(s) (LRB):  REPAIR, ROTATOR CUFF, ARTHROSCOPIC (Right)    Patient location: PACU    Anesthesia Type: general    Transport from OR: Transported from OR on room air with adequate spontaneous ventilation    Post pain: adequate analgesia    Post assessment: no apparent anesthetic complications    Post vital signs: stable    Level of consciousness: sedated    Nausea/Vomiting: no nausea/vomiting    Complications: none    Transfer of care protocol was followedComments: /64    RR 16  O2 Sat 99  Temp 36.5      Last vitals:   Visit Vitals  /67   Pulse 64   Temp 36.7 °C (98.1 °F) (Temporal)   Resp 17   Ht 5' 6.93" (1.7 m)   Wt 95.9 kg (211 lb 6.7 oz)   SpO2 100%   Breastfeeding No   BMI 33.18 kg/m²     "

## 2022-10-20 ENCOUNTER — ANTI-COAG VISIT (OUTPATIENT)
Dept: CARDIOLOGY | Facility: HOSPITAL | Age: 62
End: 2022-10-20
Payer: MEDICAID

## 2022-10-20 DIAGNOSIS — Z86.718 PERSONAL HISTORY OF DVT (DEEP VEIN THROMBOSIS): ICD-10-CM

## 2022-10-20 DIAGNOSIS — Z86.711 PERSONAL HISTORY OF PE (PULMONARY EMBOLISM): ICD-10-CM

## 2022-10-20 LAB — INR PPP: 1.1 (ref 2–3)

## 2022-10-20 PROCEDURE — 85610 PROTHROMBIN TIME: CPT

## 2022-10-24 ENCOUNTER — ANTI-COAG VISIT (OUTPATIENT)
Dept: CARDIOLOGY | Facility: HOSPITAL | Age: 62
End: 2022-10-24
Payer: MEDICARE

## 2022-10-24 DIAGNOSIS — Z86.711 PERSONAL HISTORY OF PE (PULMONARY EMBOLISM): ICD-10-CM

## 2022-10-24 DIAGNOSIS — Z86.718 PERSONAL HISTORY OF DVT (DEEP VEIN THROMBOSIS): ICD-10-CM

## 2022-10-24 LAB — INR PPP: 1.9 (ref 2–3)

## 2022-10-24 PROCEDURE — 85610 PROTHROMBIN TIME: CPT

## 2022-10-24 RX ORDER — HYDROCODONE BITARTRATE AND ACETAMINOPHEN 7.5; 325 MG/1; MG/1
1 TABLET ORAL EVERY 8 HOURS PRN
Qty: 21 TABLET | Refills: 0 | Status: SHIPPED | OUTPATIENT
Start: 2022-10-24 | End: 2022-10-31

## 2022-10-31 ENCOUNTER — OFFICE VISIT (OUTPATIENT)
Dept: ORTHOPEDICS | Facility: CLINIC | Age: 62
End: 2022-10-31
Payer: MEDICARE

## 2022-10-31 VITALS — BODY MASS INDEX: 33.12 KG/M2 | WEIGHT: 211 LBS | HEIGHT: 67 IN

## 2022-10-31 DIAGNOSIS — S46.011D TRAUMATIC COMPLETE TEAR OF RIGHT ROTATOR CUFF, SUBSEQUENT ENCOUNTER: Primary | ICD-10-CM

## 2022-10-31 PROCEDURE — 4010F ACE/ARB THERAPY RXD/TAKEN: CPT | Mod: CPTII,,,

## 2022-10-31 PROCEDURE — 99024 PR POST-OP FOLLOW-UP VISIT: ICD-10-PCS | Mod: ,,,

## 2022-10-31 PROCEDURE — 1159F MED LIST DOCD IN RCRD: CPT | Mod: CPTII,,,

## 2022-10-31 PROCEDURE — 4010F PR ACE/ARB THEARPY RXD/TAKEN: ICD-10-PCS | Mod: CPTII,,,

## 2022-10-31 PROCEDURE — 1160F RVW MEDS BY RX/DR IN RCRD: CPT | Mod: CPTII,,,

## 2022-10-31 PROCEDURE — 1160F PR REVIEW ALL MEDS BY PRESCRIBER/CLIN PHARMACIST DOCUMENTED: ICD-10-PCS | Mod: CPTII,,,

## 2022-10-31 PROCEDURE — 1159F PR MEDICATION LIST DOCUMENTED IN MEDICAL RECORD: ICD-10-PCS | Mod: CPTII,,,

## 2022-10-31 PROCEDURE — 99024 POSTOP FOLLOW-UP VISIT: CPT | Mod: ,,,

## 2022-10-31 NOTE — PROGRESS NOTES
Subjective:    CC: Post-op Evaluation of the Right Shoulder and Post-op Evaluation (R shoulder SAD, zack RTC 10/17/22 - 1/15/23 - pt states that she is a little sore today, but she has been feeling okay - td)       HPI:  Patient returns to clinic for first post op visit. Status post right rotator cuff repair on 10/17/2022. Approximately 2 weeks out. The patients pain is well managed on current narcotic. The patient states no signs of infection. Patient presents today without sling but states she has been wearing it and remain nonweightbearing no heavy lifting. No new complaints.    ROS: Refer to HPI for pertinent ROS. All other 12 point systems negative.    Objective:    There were no vitals filed for this visit.     Physical Exam:  Right upper extremity compartments are soft and warm. There are no signs or symptoms of DVT or infection. Incision sites are well healed, clean, and dry. Appropriately tender to palpation about her incision sites. Passive range of motion shows that the patient has 90 degrees of abduction and forward flexion; 45 active ROM. Neurovascularly intact distally.    Images:  Previous Images Reviewed and discussed with patient.    Assessment:  1. Traumatic complete tear of right rotator cuff, subsequent encounter  - Ambulatory referral/consult to Physical/Occupational Therapy; Future       Plan:  Physical exam and intraoperative findings discussed with the patient. Wound care instructions given.  Patient is doing well postoperatively.  Will start formal physical therapy to regain range of motion.. The Patient was instructed to take pain medication as needed with appropriate precautions and to refrain from heavy lifting. I would like to see the patient back in four weeks to assess the patients progress.    Follow up: Follow up in about 4 weeks (around 11/28/2022).

## 2022-11-01 ENCOUNTER — ANTI-COAG VISIT (OUTPATIENT)
Dept: CARDIOLOGY | Facility: HOSPITAL | Age: 62
End: 2022-11-01
Payer: MEDICAID

## 2022-11-01 DIAGNOSIS — Z86.711 PERSONAL HISTORY OF PE (PULMONARY EMBOLISM): ICD-10-CM

## 2022-11-01 DIAGNOSIS — Z86.718 PERSONAL HISTORY OF DVT (DEEP VEIN THROMBOSIS): ICD-10-CM

## 2022-11-01 LAB — INR PPP: 2.3 (ref 2–3)

## 2022-11-01 PROCEDURE — 99211 OFF/OP EST MAY X REQ PHY/QHP: CPT

## 2022-11-01 PROCEDURE — 85610 PROTHROMBIN TIME: CPT

## 2022-11-03 ENCOUNTER — CLINICAL SUPPORT (OUTPATIENT)
Dept: REHABILITATION | Facility: HOSPITAL | Age: 62
End: 2022-11-03
Payer: MEDICAID

## 2022-11-03 DIAGNOSIS — M25.511 RIGHT SHOULDER PAIN, UNSPECIFIED CHRONICITY: ICD-10-CM

## 2022-11-03 DIAGNOSIS — M25.611 DECREASED ROM OF RIGHT SHOULDER: ICD-10-CM

## 2022-11-03 DIAGNOSIS — Z98.890 S/P RIGHT ROTATOR CUFF REPAIR: Primary | ICD-10-CM

## 2022-11-03 DIAGNOSIS — S46.011D TRAUMATIC COMPLETE TEAR OF RIGHT ROTATOR CUFF, SUBSEQUENT ENCOUNTER: ICD-10-CM

## 2022-11-03 DIAGNOSIS — R29.898 IMPAIRED STRENGTH OF SHOULDER MUSCLES: ICD-10-CM

## 2022-11-03 PROCEDURE — 97162 PT EVAL MOD COMPLEX 30 MIN: CPT

## 2022-11-03 NOTE — PLAN OF CARE
[facility]   Physical Therapy Initial Evaluation     Date: 11/3/2022   Name: Gayle Gold  Clinic Number: [id number]    Therapy Diagnosis:   Encounter Diagnoses   Name Primary?    S/P right rotator cuff repair Yes    Traumatic complete tear of right rotator cuff, subsequent encounter     Right shoulder pain, unspecified chronicity     Decreased ROM of right shoulder     Impaired strength of shoulder muscles      Physician: Tammy Pillai, PA*    Physician Orders: PT Eval and Treat   Medical Diagnosis from Referral: S46.011D Traumatic complete tear of R rotator cuff / Z98.890 S/P Right rotator cuff repair   Evaluation Date: 11/3/2022  Authorization Period Expiration: TBD  Plan of Care Expiration: TBD  Reassessment / POC Due: TBD  Visit # / Visits authorized: TBD     Precautions: Standard     Time In: 0840  Time Out: 0925    Total Appointment Time (timed & untimed codes): 45 minutes    SUBJECTIVE     Date of onset / History of current condition - Gayle reports: gradual onset of R shoulder pain and function beginning in February of 2021. Was subsequently evaluated by Orthopedic surgeon and found to have a complete tear of the rotator cuff R shoulder. Gayle underwent R rotator cuff repair surgery 10/17/2022. She presents today wearing R shoulder sling and swath. States that she is R UE dominant.       Limitation/Restriction for FOTO shoulder  Survey    Therapist reviewed FOTO scores for Gayle Gold on 11/3/2022.   FOTO documents entered into [employer] - see Media section.     Functional Status Score: 49 %         Falls: prior to Feb 2021    Imaging, x-rays @ [facility]:    Prior Therapy: none   Social History:  lives with their family  Occupation: not working   Prior Level of Function: IND   Current Level of Function: requires >50% assistance with ADLs     Pain:  Current 5/10, worst 9/10, best 5/10   Location: right shoulder    Description: Aching and Deep  Aggravating Factors:  moving  Easing Factors: pain medication and rest    Patients goals: patient would like to regain pain free function of R UE to perform her daily activities     Medical History:   Past Medical History:   Diagnosis Date    Claustrophobia     Diabetes mellitus, type 2     DVT (deep venous thrombosis)     High cholesterol     Hypertension     Pulmonary embolism        Surgical History:   Gayle Gold  has a past surgical history that includes Hysterectomy and Arthroscopic repair of rotator cuff of shoulder (Right, 10/17/2022).    Medications:   Gayle has a current medication list which includes the following prescription(s): amlodipine, citalopram, enoxaparin, gabapentin, glimepiride, januvia, jardiance, lisinopril-hydrochlorothiazide, myrbetriq, rosuvastatin, tizanidine, trazodone, triamcinolone acetonide 0.1%, and warfarin, and the following Facility-Administered Medications: sodium chloride 0.9%, sodium chloride 0.9%, fentanyl, fentanyl, and ropivacaine 0.5% (pf).    Allergies:   Review of patient's allergies indicates:   Allergen Reactions    Tramadol Itching          OBJECTIVE     Posture: elevated R shoulder   Palpation: tenderness mainly found in anterior aspect of shoulder joint   Sensation: light touch intact     Range of Motion/Strength:     Shoulder Right Left Pain/Dysfunction with Movement   PROM  WNL    flexion  90*     extension  NT     abduction  30*     adduction  NT     Internal rotation  50*     ER at 90° abd  NT     ER at 0° abd  30*         U/E MMT Right Left Pain/Dysfunction with Movement   Shoulder Flexion **Not tested due to restrictions 5/5    Shoulder Extension x 5/5    Shoulder Abduction x 5/5    Shoulder Adduction x 5/5    Shoulder Internal Rotation x 5/5    Shoulder External Rotation x 5/5    Shoulder ER  @ 90* Abduction x 5/5    Elbow Flexion  3+/5 5/5    Elbow Extension 3+/5 5/5    Rhomboids 3/5 5/5    Mid Traps 3/5 5/5    Low Traps 3/5 5/5          TREATMENT     Total Treatment time  (time-based codes) separate from Evaluation: 5 minutes      Gayle received the treatments listed below:      therapeutic exercises to develop strength, ROM, and posture for 5 minutes including:      Therapeutic Exercise Grid     Exercise 1  Exercise 2  Exercise 3  Exercise 4    Exercise :    Pendulum  Scap retraction    AROM: Wrist/Forearm/ Elbow         Repetition/Time :    X 20   10 x 5 sec hold    X 20 reps         Resist or Assist :    Side to side   Circles               Comment :                Done :                                Exercise 5  Exercise 6  Exercise 7  Exercise 8    Exercise :                  Repetition/Time :                  Resist or Assist :                  Comment :                  Done :                                  Exercise 9  Exercise 10  Exercise 11  Exercise 12    Exercise :                  Repetition/Time :                  Resist or Assist :                  Comment :                  Done :                                 Exercise 13 Exercise 14  Exercise 15  Exercise 16   Exercise :                  Repetition/Time :                  Resist or Assist :                  Comment :                  Done :                                  Exercise 17 Exercise 18 Exercise 19 Exercise 20    Exercise :                  Repetition/Time :                  Resist or Assist :                  Comment :                  Done :                               PATIENT EDUCATION AND HOME EXERCISES     Education provided / Written Home Exercises Provided  Exercises and stretches demonstrated and initiated, written HEP issued, educated pt on importance and benefits of exercises and stretches, and encouraged pt to continue with HEP daily. Pt voiced understanding    ASSESSMENT     Gayle is a 62 y.o. female referred to outpatient Physical Therapy with a medical diagnosis of S46.011D Traumatic complete tear of R rotator cuff / Z98.890 S/P Right rotator cuff repair . Patient presents with R  shoulder pain, limited ROM, and strength.     Patient prognosis is Excellent.   Patient will benefit from skilled outpatient Physical Therapy to address the deficits stated above and in the chart below, provide patient /family education, and to maximize patientt's level of independence.     Plan of care discussed with patient: Yes  Patient's spiritual, cultural and educational needs considered and patient is agreeable to the plan of care and goals as stated below:     Anticipated Barriers for therapy: S/P R Rotator Cuff Repair     Goals:  Short Term Goals (3 Weeks):   1. Patient will be compliant with home exercise program to assist therapy in restoring pain free motion of the shoulder.   2. Patient will improve impaired R elbow/wrist/hand  manual muscle tests 1/2 grade to improve strength for functional tasks.  3. Patient will improve R PROM shoulder flexion >/= 20 degrees to improve functional mobility of upper extremities.  4. Patient will improve R PROM shoulder abduction >/= 20 degrees to improve functional mobility of upper extremities.      Long Term Goals (6 Weeks):   1. Patient will improve FOTO score to </= 63% to demonstrate improvements in R UE carrying, moving, and handling objects  2. Patient will improve impaired shoulder manual muscle tests grade to 4/5 bilaterally to improve strength for household duties.  3. Patient will improve R PROM shoulder flexion to >/= 140 degrees to improve functional mobility of upper extremities.   4. Patient will improve R PROM shoulder abduction to >/= 140 degrees to improve functional mobility of upper extremities.  5. Patient will report pain level </= 2/10 following completion of a functional activity.     PLAN     Outpatient Physical Therapy 3 times weekly for 6 weeks to include the following interventions: Manual Therapy, Neuromuscular Re-ed, Patient Education, Therapeutic Activities, and Therapeutic Exercise.     Ruslan Mcnamara, PT

## 2022-11-07 DIAGNOSIS — Z98.890 S/P RIGHT ROTATOR CUFF REPAIR: ICD-10-CM

## 2022-11-07 DIAGNOSIS — S46.011D STRAIN OF TENDON OF RIGHT ROTATOR CUFF, SUBSEQUENT ENCOUNTER: Primary | ICD-10-CM

## 2022-11-08 ENCOUNTER — CLINICAL SUPPORT (OUTPATIENT)
Dept: REHABILITATION | Facility: HOSPITAL | Age: 62
End: 2022-11-08
Payer: MEDICARE

## 2022-11-08 DIAGNOSIS — M25.511 RIGHT SHOULDER PAIN, UNSPECIFIED CHRONICITY: Primary | ICD-10-CM

## 2022-11-08 DIAGNOSIS — R29.898 IMPAIRED STRENGTH OF SHOULDER MUSCLES: ICD-10-CM

## 2022-11-08 DIAGNOSIS — M25.611 DECREASED ROM OF RIGHT SHOULDER: ICD-10-CM

## 2022-11-08 PROCEDURE — 97110 THERAPEUTIC EXERCISES: CPT

## 2022-11-08 NOTE — PROGRESS NOTES
[facility]   Physical Therapy Treatment Note     Name: Gayle Gold  Clinic Number: [id number]    Therapy Diagnosis:   Encounter Diagnoses   Name Primary?    Right shoulder pain, unspecified chronicity Yes    Decreased ROM of right shoulder     Impaired strength of shoulder muscles      Physician: Tammy Pillai PA*    Visit Date: 11/8/2022    [Physician Orders: PT Eval and Treat   Medical Diagnosis from Referral: S46.011D Traumatic complete tear of R rotator cuff / Z98.890 S/P Right rotator cuff repair   Evaluation Date: 11/3/2022  Authorization Period Expiration: 12/22/2022  Plan of Care Expiration: 12/15/2022  Reassessment / POC Due: 12/02/2022  Visit # / Visits authorized: 1 / 18     PTA Visit #: 0/5     Time In: 0810  Time Out: 0840  Total Billable Time: 30 minutes    SUBJECTIVE     Pt reports: mild discomfort deep in shoulder joint. .  She was compliant with home exercise program.  Response to previous treatment: n/a  Functional change: n/a    Pain: 4/10  Location: right shoulder      OBJECTIVE     Objective Measures updated at progress report unless specified.     Treatment     Gayle received the treatments listed below:      therapeutic exercises to develop strength, ROM, flexibility, and posture for 30 minutes including:  Therapeutic Exercise   Therapeutic Exercise Grid     Exercise 1  Exercise 2  Exercise 3  Exercise 4    Exercise :    AROM: wrist/ Elbow  PROM: Fwd Elevate <90 ER<20 scap plane     Table Slides : Flex / Horiz ABD    Scapula retraction      Repetition/Time :    x 20   x 20   x 20   20 x 3 sec      Resist or Assist :    No resistance   No resistance   No resistance   No resistance     Comment :                Done :                                Exercise 5  Exercise 6  Exercise 7  Exercise 8    Exercise :    Low Row          Repetition/Time :    20 x 3 sec         Resist or Assist :    No resistance        Comment :            Done :                             Exercise 9  Exercise 10  Exercise 11  Exercise 12    Exercise :          Repetition/Time :          Resist or Assist :          Comment :          Done :                           Exercise 13 Exercise 14  Exercise 15  Exercise 16   Exercise :          Repetition/Time :          Resist or Assist :          Comment :          Done :                            Patient Education and Home Exercises     Home Exercises Provided and Patient Education Provided     Education provided:   - HEP     Written Home Exercises Provided: yes. Exercises were reviewed and Gayle was able to demonstrate them prior to the end of the session.  Gayle demonstrated good  understanding of the education provided. See EMR under Patient Instructions for exercises provided during therapy sessions    ASSESSMENT     Gayle tolerated treatment well as demonstrated with completion of exercises and reports no pain . Good PROM achieved.     Gayle Is progressing well towards her goals.   Pt prognosis is Excellent.     Pt will continue to benefit from skilled outpatient physical therapy to address the deficits listed in the problem list box on initial evaluation, provide pt/family education and to maximize pt's level of independence in the home and community environment.     Pt's spiritual, cultural and educational needs considered and pt agreeable to plan of care and goals.     Anticipated barriers to physical therapy: none     Goals:  Short Term Goals (3 Weeks):   1. Patient will be compliant with home exercise program to assist therapy in restoring pain free motion of the shoulder.   2. Patient will improve impaired R elbow/wrist/hand  manual muscle tests 1/2 grade to improve strength for functional tasks.  3. Patient will improve R PROM shoulder flexion >/= 20 degrees to improve functional mobility of upper extremities.  4. Patient will improve R PROM shoulder abduction >/= 20 degrees to improve functional mobility of upper extremities.       Long Term Goals (6 Weeks):   1. Patient will improve FOTO score to </= 63% to demonstrate improvements in R UE carrying, moving, and handling objects  2. Patient will improve impaired shoulder manual muscle tests grade to 4/5 bilaterally to improve strength for household duties.  3. Patient will improve R PROM shoulder flexion to >/= 140 degrees to improve functional mobility of upper extremities.   4. Patient will improve R PROM shoulder abduction to >/= 140 degrees to improve functional mobility of upper extremities.  5. Patient will report pain level </= 2/10 following completion of a functional activity.     PLAN     Outpatient Physical Therapy 3 times weekly for 6 weeks to include the following interventions: Manual Therapy, Neuromuscular Re-ed, Patient Education, Therapeutic Activities, and Therapeutic Exercise.     Ruslan Mcnamara, PT

## 2022-11-09 ENCOUNTER — CLINICAL SUPPORT (OUTPATIENT)
Dept: REHABILITATION | Facility: HOSPITAL | Age: 62
End: 2022-11-09
Payer: MEDICAID

## 2022-11-09 ENCOUNTER — TELEPHONE (OUTPATIENT)
Dept: ORTHOPEDICS | Facility: CLINIC | Age: 62
End: 2022-11-09
Payer: MEDICAID

## 2022-11-09 DIAGNOSIS — M25.511 RIGHT SHOULDER PAIN, UNSPECIFIED CHRONICITY: Primary | ICD-10-CM

## 2022-11-09 DIAGNOSIS — M25.611 DECREASED ROM OF RIGHT SHOULDER: ICD-10-CM

## 2022-11-09 DIAGNOSIS — R29.898 IMPAIRED STRENGTH OF SHOULDER MUSCLES: ICD-10-CM

## 2022-11-09 PROCEDURE — 97110 THERAPEUTIC EXERCISES: CPT | Mod: CQ

## 2022-11-09 NOTE — PROGRESS NOTES
[facility]   Physical Therapy Treatment Note     Name: Gayle Gold  Clinic Number: [id number]    Therapy Diagnosis:   Encounter Diagnoses   Name Primary?    Right shoulder pain, unspecified chronicity Yes    Decreased ROM of right shoulder     Impaired strength of shoulder muscles        Physician: Tammy Pillai PA*    Visit Date: 11/9/2022    [Physician Orders: PT Eval and Treat   Medical Diagnosis from Referral: S46.011D Traumatic complete tear of R rotator cuff / Z98.890 S/P Right rotator cuff repair   Evaluation Date: 11/3/2022  Authorization Period Expiration: 12/22/2022  Plan of Care Expiration: 12/15/2022  Reassessment / POC Due: 12/02/2022  Visit # / Visits authorized: 2/ 18     PTA Visit #: 1/5     Time In: 0830  Time Out: 0857  Total Billable Time: 27 minutes    SUBJECTIVE     Pt reports: she is sore after appointment yesterday. R shoulder aches a lot when she sleeps.  She was compliant with home exercise program.  Response to previous treatment: n/a  Functional change: n/a    Pain: 6/10  Location: right shoulder      OBJECTIVE     Objective Measures updated at progress report unless specified.     Treatment     Gayle received the treatments listed below:      therapeutic exercises to develop strength, ROM, flexibility, and posture for 27 minutes including:  Therapeutic Exercise   Therapeutic Exercise Grid     Exercise 1  Exercise 2  Exercise 3  Exercise 4    Exercise :    AROM: wrist/ Elbow  PROM: Fwd Elevate <90 ER<20 scap plane     Table Slides : Flex / Horiz ABD    Scapula retraction      Repetition/Time :    x 20   x 20   x 20   20 x 3 sec      Resist or Assist :    No resistance   No resistance   No resistance   No resistance     Comment :                Done :                                Exercise 5  Exercise 6  Exercise 7  Exercise 8    Exercise :    Low Row          Repetition/Time :    20 x 3 sec         Resist or Assist :    No resistance        Comment  :            Done :                            Exercise 9  Exercise 10  Exercise 11  Exercise 12    Exercise :          Repetition/Time :          Resist or Assist :          Comment :          Done :                           Exercise 13 Exercise 14  Exercise 15  Exercise 16   Exercise :          Repetition/Time :          Resist or Assist :          Comment :          Done :                            Patient Education and Home Exercises     Home Exercises Provided and Patient Education Provided     Education provided:   - HEP     Written Home Exercises Provided: yes. Exercises were reviewed and Gayle was able to demonstrate them prior to the end of the session.  Gayle demonstrated good  understanding of the education provided. See EMR under Patient Instructions for exercises provided during therapy sessions    ASSESSMENT     Gayle completed all exercises with good effort. She had some mild increased pain after PROM exercises.     Gayle Is progressing well towards her goals.   Pt prognosis is Excellent.     Pt will continue to benefit from skilled outpatient physical therapy to address the deficits listed in the problem list box on initial evaluation, provide pt/family education and to maximize pt's level of independence in the home and community environment.     Pt's spiritual, cultural and educational needs considered and pt agreeable to plan of care and goals.     Anticipated barriers to physical therapy: none     Goals:  Short Term Goals (3 Weeks):   1. Patient will be compliant with home exercise program to assist therapy in restoring pain free motion of the shoulder.   2. Patient will improve impaired R elbow/wrist/hand  manual muscle tests 1/2 grade to improve strength for functional tasks.  3. Patient will improve R PROM shoulder flexion >/= 20 degrees to improve functional mobility of upper extremities.  4. Patient will improve R PROM shoulder abduction >/= 20 degrees to improve functional mobility of  upper extremities.      Long Term Goals (6 Weeks):   1. Patient will improve FOTO score to </= 63% to demonstrate improvements in R UE carrying, moving, and handling objects  2. Patient will improve impaired shoulder manual muscle tests grade to 4/5 bilaterally to improve strength for household duties.  3. Patient will improve R PROM shoulder flexion to >/= 140 degrees to improve functional mobility of upper extremities.   4. Patient will improve R PROM shoulder abduction to >/= 140 degrees to improve functional mobility of upper extremities.  5. Patient will report pain level </= 2/10 following completion of a functional activity.     PLAN     Outpatient Physical Therapy 3 times weekly for 6 weeks to include the following interventions: Manual Therapy, Neuromuscular Re-ed, Patient Education, Therapeutic Activities, and Therapeutic Exercise.     Gabino Jc, PTA

## 2022-11-09 NOTE — TELEPHONE ENCOUNTER
Patient states that her pain medication is not strong enough. She is requesting stronger. She states that therapy is causing her to have pain.

## 2022-11-10 ENCOUNTER — CLINICAL SUPPORT (OUTPATIENT)
Dept: REHABILITATION | Facility: HOSPITAL | Age: 62
End: 2022-11-10
Payer: MEDICARE

## 2022-11-10 DIAGNOSIS — M25.611 DECREASED ROM OF RIGHT SHOULDER: ICD-10-CM

## 2022-11-10 DIAGNOSIS — M25.511 RIGHT SHOULDER PAIN, UNSPECIFIED CHRONICITY: Primary | ICD-10-CM

## 2022-11-10 DIAGNOSIS — R29.898 IMPAIRED STRENGTH OF SHOULDER MUSCLES: ICD-10-CM

## 2022-11-10 PROCEDURE — 97110 THERAPEUTIC EXERCISES: CPT

## 2022-11-10 NOTE — PROGRESS NOTES
[facility]   Physical Therapy Treatment Note     Name: Gayle Gold  Clinic Number: [id number]    Therapy Diagnosis:   Encounter Diagnoses   Name Primary?    Right shoulder pain, unspecified chronicity Yes    Decreased ROM of right shoulder     Impaired strength of shoulder muscles        Physician: Tammy Pillai PA*    Visit Date: 11/10/2022    [Physician Orders: PT Eval and Treat   Medical Diagnosis from Referral: S46.011D Traumatic complete tear of R rotator cuff / Z98.890 S/P Right rotator cuff repair   Evaluation Date: 11/3/2022  Authorization Period Expiration: 12/22/2022  Plan of Care Expiration: 12/15/2022  Reassessment / POC Due: 12/02/2022  Visit # / Visits authorized: 3/ 18     PTA Visit #: 1/5     Time In: 0830  Time Out: 0900  Total Billable Time: 30 minutes    SUBJECTIVE     Pt reports: she is having a good day today. Soreness in R UE has subsided.   She was compliant with home exercise program.  Response to previous treatment: good   Functional change: decreased soreness    Pain: 4/10  Location: right shoulder      OBJECTIVE     Objective Measures updated at progress report unless specified.     Treatment     Gayle received the treatments listed below:      therapeutic exercises to develop strength, ROM, flexibility, and posture for 27 minutes including:  Therapeutic Exercise   Therapeutic Exercise Grid     Exercise 1  Exercise 2  Exercise 3  Exercise 4    Exercise :    AROM: wrist/ Elbow  PROM: Fwd Elevate <90 ER<20 scap plane     Table Slides : Flex / Horiz ABD    Scapula retraction      Repetition/Time :    x 20   x 20   x 20   20 x 3 sec      Resist or Assist :    No resistance   No resistance   No resistance   No resistance     Comment :                Done :    Yes   Yes  Yes  Yes                   Exercise 5  Exercise 6  Exercise 7  Exercise 8    Exercise :    Low Row          Repetition/Time :    20 x 3 sec         Resist or Assist :    No resistance         Comment :            Done :    Yes                         Exercise 9  Exercise 10  Exercise 11  Exercise 12    Exercise :          Repetition/Time :          Resist or Assist :          Comment :          Done :                           Exercise 13 Exercise 14  Exercise 15  Exercise 16   Exercise :          Repetition/Time :          Resist or Assist :          Comment :          Done :                            Patient Education and Home Exercises     Home Exercises Provided and Patient Education Provided     Education provided:   - HEP     Written Home Exercises Provided: yes. Exercises were reviewed and Gayle was able to demonstrate them prior to the end of the session.  Gayle demonstrated good  understanding of the education provided. See EMR under Patient Instructions for exercises provided during therapy sessions    ASSESSMENT     Gayle tolerated treatment well today with minimal reports of discomfort during session. Completed all prescribed exercises and reps.   Gayle Is progressing well towards her goals.   Pt prognosis is Excellent.     Pt will continue to benefit from skilled outpatient physical therapy to address the deficits listed in the problem list box on initial evaluation, provide pt/family education and to maximize pt's level of independence in the home and community environment.     Pt's spiritual, cultural and educational needs considered and pt agreeable to plan of care and goals.     Anticipated barriers to physical therapy: none     Goals:  Short Term Goals (3 Weeks):   1. Patient will be compliant with home exercise program to assist therapy in restoring pain free motion of the shoulder.   2. Patient will improve impaired R elbow/wrist/hand  manual muscle tests 1/2 grade to improve strength for functional tasks.  3. Patient will improve R PROM shoulder flexion >/= 20 degrees to improve functional mobility of upper extremities.  4. Patient will improve R PROM shoulder abduction >/= 20  degrees to improve functional mobility of upper extremities.      Long Term Goals (6 Weeks):   1. Patient will improve FOTO score to </= 63% to demonstrate improvements in R UE carrying, moving, and handling objects  2. Patient will improve impaired shoulder manual muscle tests grade to 4/5 bilaterally to improve strength for household duties.  3. Patient will improve R PROM shoulder flexion to >/= 140 degrees to improve functional mobility of upper extremities.   4. Patient will improve R PROM shoulder abduction to >/= 140 degrees to improve functional mobility of upper extremities.  5. Patient will report pain level </= 2/10 following completion of a functional activity.     PLAN     Outpatient Physical Therapy 3 times weekly for 6 weeks to include the following interventions: Manual Therapy, Neuromuscular Re-ed, Patient Education, Therapeutic Activities, and Therapeutic Exercise.     Ruslan Mcnamara, PT

## 2022-11-15 ENCOUNTER — CLINICAL SUPPORT (OUTPATIENT)
Dept: REHABILITATION | Facility: HOSPITAL | Age: 62
End: 2022-11-15
Payer: MEDICAID

## 2022-11-15 ENCOUNTER — ANTI-COAG VISIT (OUTPATIENT)
Dept: CARDIOLOGY | Facility: HOSPITAL | Age: 62
End: 2022-11-15
Payer: MEDICAID

## 2022-11-15 DIAGNOSIS — M25.511 RIGHT SHOULDER PAIN, UNSPECIFIED CHRONICITY: Primary | ICD-10-CM

## 2022-11-15 DIAGNOSIS — Z86.718 PERSONAL HISTORY OF DVT (DEEP VEIN THROMBOSIS): ICD-10-CM

## 2022-11-15 DIAGNOSIS — R29.898 IMPAIRED STRENGTH OF SHOULDER MUSCLES: ICD-10-CM

## 2022-11-15 DIAGNOSIS — Z86.711 PERSONAL HISTORY OF PE (PULMONARY EMBOLISM): ICD-10-CM

## 2022-11-15 DIAGNOSIS — M25.611 DECREASED ROM OF RIGHT SHOULDER: ICD-10-CM

## 2022-11-15 LAB — INR PPP: 3.4 (ref 2–3)

## 2022-11-15 PROCEDURE — 97110 THERAPEUTIC EXERCISES: CPT | Mod: CQ

## 2022-11-15 PROCEDURE — 85610 PROTHROMBIN TIME: CPT

## 2022-11-15 NOTE — PROGRESS NOTES
[facility]   Physical Therapy Treatment Note     Name: Gayle Gold  Clinic Number: [id number]    Therapy Diagnosis:   Encounter Diagnoses   Name Primary?    Right shoulder pain, unspecified chronicity Yes    Decreased ROM of right shoulder     Impaired strength of shoulder muscles          Physician: Tammy Pillai PA*    Visit Date: 11/15/2022    [Physician Orders: PT Eval and Treat   Medical Diagnosis from Referral: S46.011D Traumatic complete tear of R rotator cuff / Z98.890 S/P Right rotator cuff repair   Evaluation Date: 11/3/2022  Authorization Period Expiration: 12/22/2022  Plan of Care Expiration: 12/15/2022  Reassessment / POC Due: 12/02/2022  Visit # / Visits authorized: 4/ 18     PTA Visit #: 1/5     Time In: 0800  Time Out: 0825  Total Billable Time: 25 minutes    SUBJECTIVE     Pt reports: feels ok. R shoulder bothers her the most right after therapy, and when trying to sleep at night.   She was compliant with home exercise program.  Response to previous treatment: good   Functional change: decreased soreness    Pain: 5/10  Location: right shoulder      OBJECTIVE     Objective Measures updated at progress report unless specified.     Treatment     Gayle received the treatments listed below:      therapeutic exercises to develop strength, ROM, flexibility, and posture for 25 minutes including:  Therapeutic Exercise   Therapeutic Exercise Grid     Exercise 1  Exercise 2  Exercise 3  Exercise 4    Exercise :    AROM: wrist/ Elbow  PROM: Fwd Elevate <90 ER<20 scap plane     Table Slides : Flex / Horiz ABD    Scapula retraction      Repetition/Time :    x 20   x 20   x 20   20 x 3 sec      Resist or Assist :    No resistance   No resistance   No resistance   No resistance     Comment :                Done :    Yes   Yes  Yes  Yes                   Exercise 5  Exercise 6  Exercise 7  Exercise 8    Exercise :    Low Row          Repetition/Time :    20 x 3 sec          Resist or Assist :    No resistance        Comment :            Done :    Yes                         Exercise 9  Exercise 10  Exercise 11  Exercise 12    Exercise :          Repetition/Time :          Resist or Assist :          Comment :          Done :                           Exercise 13 Exercise 14  Exercise 15  Exercise 16   Exercise :          Repetition/Time :          Resist or Assist :          Comment :          Done :                            Patient Education and Home Exercises     Home Exercises Provided and Patient Education Provided     Education provided:   - HEP     Written Home Exercises Provided: yes. Exercises were reviewed and Gayle was able to demonstrate them prior to the end of the session.  Gayle demonstrated good  understanding of the education provided. See EMR under Patient Instructions for exercises provided during therapy sessions    ASSESSMENT     Patient completed all exercises with good effort. Minimal pain reported during session. Verbal cues needed for patient to fully relax during PROM.   Gayle Is progressing well towards her goals.   Pt prognosis is Excellent.     Pt will continue to benefit from skilled outpatient physical therapy to address the deficits listed in the problem list box on initial evaluation, provide pt/family education and to maximize pt's level of independence in the home and community environment.     Pt's spiritual, cultural and educational needs considered and pt agreeable to plan of care and goals.     Anticipated barriers to physical therapy: none     Goals:  Short Term Goals (3 Weeks):   1. Patient will be compliant with home exercise program to assist therapy in restoring pain free motion of the shoulder.   2. Patient will improve impaired R elbow/wrist/hand  manual muscle tests 1/2 grade to improve strength for functional tasks.  3. Patient will improve R PROM shoulder flexion >/= 20 degrees to improve functional mobility of upper  extremities.  4. Patient will improve R PROM shoulder abduction >/= 20 degrees to improve functional mobility of upper extremities.      Long Term Goals (6 Weeks):   1. Patient will improve FOTO score to </= 63% to demonstrate improvements in R UE carrying, moving, and handling objects  2. Patient will improve impaired shoulder manual muscle tests grade to 4/5 bilaterally to improve strength for household duties.  3. Patient will improve R PROM shoulder flexion to >/= 140 degrees to improve functional mobility of upper extremities.   4. Patient will improve R PROM shoulder abduction to >/= 140 degrees to improve functional mobility of upper extremities.  5. Patient will report pain level </= 2/10 following completion of a functional activity.     PLAN     Outpatient Physical Therapy 3 times weekly for 6 weeks to include the following interventions: Manual Therapy, Neuromuscular Re-ed, Patient Education, Therapeutic Activities, and Therapeutic Exercise.     Gabino Jc, PTA

## 2022-11-16 ENCOUNTER — CLINICAL SUPPORT (OUTPATIENT)
Dept: REHABILITATION | Facility: HOSPITAL | Age: 62
End: 2022-11-16
Payer: MEDICAID

## 2022-11-16 DIAGNOSIS — R29.898 IMPAIRED STRENGTH OF SHOULDER MUSCLES: ICD-10-CM

## 2022-11-16 DIAGNOSIS — M25.611 DECREASED ROM OF RIGHT SHOULDER: ICD-10-CM

## 2022-11-16 DIAGNOSIS — M25.511 RIGHT SHOULDER PAIN, UNSPECIFIED CHRONICITY: Primary | ICD-10-CM

## 2022-11-16 PROCEDURE — 97110 THERAPEUTIC EXERCISES: CPT

## 2022-11-16 NOTE — PROGRESS NOTES
[facility]   Physical Therapy Treatment Note     Name: Gayle Gold  Clinic Number: [id number]    Therapy Diagnosis:   Encounter Diagnoses   Name Primary?    Right shoulder pain, unspecified chronicity Yes    Decreased ROM of right shoulder     Impaired strength of shoulder muscles      Physician: Tammy Pillai PA*    Visit Date: 11/16/2022    [Physician Orders: PT Eval and Treat   Medical Diagnosis from Referral: S46.011D Traumatic complete tear of R rotator cuff / Z98.890 S/P Right rotator cuff repair   Evaluation Date: 11/3/2022  Authorization Period Expiration: 12/22/2022  Plan of Care Expiration: 12/15/2022  Reassessment / POC Due: 12/02/2022  Visit # / Visits authorized: 5/ 18     Time In: 08:00  Time Out: 08:30  Total Billable Time: 30 minutes    SUBJECTIVE     Pt reports: R shoulder does not feel too bad. Removed abduction pillow because it was uncomfortable, unable to sleep wearing sling  She was compliant with home exercise program.  Response to previous treatment: good   Functional change: decreased soreness    Pain: 5/10 without anything for pain  Location: right shoulder      OBJECTIVE     Objective Measures updated at progress report unless specified.     Treatment     Gayle received the treatments listed below:      therapeutic exercises to develop strength, ROM, flexibility, and posture for 30 minutes including:  Therapeutic Exercise   Therapeutic Exercise Grid     Exercise 1  Exercise 2  Exercise 3  Exercise 4    Exercise :    AROM: wrist/ Elbow  PROM: Fwd Elevate <90 ER<20 scap plane     Table Slides : Flex / scaption    Scapula retraction      Repetition/Time :    x 20   x 20   x 20   20 x 5 sec      Resist or Assist :    No resistance   No resistance   No resistance   No resistance     Comment :                Done :    Yes   Yes  Yes  Yes                   Exercise 5  Exercise 6  Exercise 7  Exercise 8    Exercise :    Low Row on SB     Codman: CW, CCW,  flexion  AAROM: scaption in L sidelying with elvated SB    Repetition/Time :    20 x 3 sec    20 20    Resist or Assist :    No resistance        Comment :    To neutral        Done :    Yes    yes yes                    Exercise 9  Exercise 10  Exercise 11  Exercise 12    Exercise :          Repetition/Time :          Resist or Assist :          Comment :          Done :                         Patient Education and Home Exercises     Home Exercises Provided and Patient Education Provided     Education provided:   - HEP     Written Home Exercises Provided: yes. Exercises were reviewed and Gayle was able to demonstrate them prior to the end of the session.  Gayle demonstrated good  understanding of the education provided. See EMR under Patient Instructions for exercises provided during therapy sessions    ASSESSMENT     Discussed slow rehab process following RTR per MD protocol and encouraged pt not to use R UE for any ADLs or IADLs. Pt has pain with PROM R shoulder movements when descending from scaption movements. Pt has difficulty relaxing to allow for PROM    Gayle Is progressing well towards her goals.   Pt prognosis is Excellent.     Pt will continue to benefit from skilled outpatient physical therapy to address the deficits listed in the problem list box on initial evaluation, provide pt/family education and to maximize pt's level of independence in the home and community environment.     Pt's spiritual, cultural and educational needs considered and pt agreeable to plan of care and goals.     Anticipated barriers to physical therapy: none     Goals:  Short Term Goals (3 Weeks):   1. Patient will be compliant with home exercise program to assist therapy in restoring pain free motion of the shoulder.   2. Patient will improve impaired R elbow/wrist/hand  manual muscle tests 1/2 grade to improve strength for functional tasks.  3. Patient will improve R PROM shoulder flexion >/= 20 degrees to improve  functional mobility of upper extremities.  4. Patient will improve R PROM shoulder abduction >/= 20 degrees to improve functional mobility of upper extremities.      Long Term Goals (6 Weeks):   1. Patient will improve FOTO score to </= 63% to demonstrate improvements in R UE carrying, moving, and handling objects  2. Patient will improve impaired shoulder manual muscle tests grade to 4/5 bilaterally to improve strength for household duties.  3. Patient will improve R PROM shoulder flexion to >/= 140 degrees to improve functional mobility of upper extremities.   4. Patient will improve R PROM shoulder abduction to >/= 140 degrees to improve functional mobility of upper extremities.  5. Patient will report pain level </= 2/10 following completion of a functional activity.     PLAN     Outpatient Physical Therapy 3 times weekly for 6 weeks to include the following interventions: Manual Therapy, Neuromuscular Re-ed, Patient Education, Therapeutic Activities, and Therapeutic Exercise.     Juanita Garcia, PT

## 2022-11-17 ENCOUNTER — CLINICAL SUPPORT (OUTPATIENT)
Dept: REHABILITATION | Facility: HOSPITAL | Age: 62
End: 2022-11-17
Payer: MEDICAID

## 2022-11-17 DIAGNOSIS — M25.611 DECREASED ROM OF RIGHT SHOULDER: ICD-10-CM

## 2022-11-17 DIAGNOSIS — M25.511 RIGHT SHOULDER PAIN, UNSPECIFIED CHRONICITY: Primary | ICD-10-CM

## 2022-11-17 DIAGNOSIS — R29.898 IMPAIRED STRENGTH OF SHOULDER MUSCLES: ICD-10-CM

## 2022-11-17 PROCEDURE — 97110 THERAPEUTIC EXERCISES: CPT

## 2022-11-17 NOTE — PROGRESS NOTES
[facility]   Physical Therapy Treatment Note     Name: Gayle Gold  Clinic Number: [id number]    Therapy Diagnosis:   Encounter Diagnoses   Name Primary?    Right shoulder pain, unspecified chronicity Yes    Decreased ROM of right shoulder     Impaired strength of shoulder muscles      Physician: Tammy Pillai PA*    Visit Date: 11/17/2022    [Physician Orders: PT Eval and Treat   Medical Diagnosis from Referral: S46.011D Traumatic complete tear of R rotator cuff / Z98.890 S/P Right rotator cuff repair   Evaluation Date: 11/3/2022  Authorization Period Expiration: 12/22/2022  Plan of Care Expiration: 12/15/2022  Reassessment / POC Due: 12/02/2022  Visit # / Visits authorized: 6/ 18     Time In: 0755  Time Out: 08:30  Total Billable Time: 35 minutes    SUBJECTIVE     Pt reports: no pain in R shoulder this a.m. Doing well with home exercises.   She was compliant with home exercise program.  Response to previous treatment: good   Functional change: no pain     Pain: 0/10 without anything for pain  Location: right shoulder      OBJECTIVE     Objective Measures updated at progress report unless specified.     Treatment     Gayle received the treatments listed below:      therapeutic exercises to develop strength, ROM, flexibility, and posture for 30 minutes including:  Therapeutic Exercise   Therapeutic Exercise Grid     Exercise 1  Exercise 2  Exercise 3  Exercise 4    Exercise :    AROM: wrist/ Elbow  PROM: Fwd Elevate <90 ER<20 scap plane     Table Slides : Flex / scaption    Scapula retraction      Repetition/Time :    x 20   x 20   x 20   20 x 5 sec      Resist or Assist :    No resistance   No resistance   No resistance   No resistance     Comment :                Done :    Yes   Yes  Yes  Yes                   Exercise 5  Exercise 6  Exercise 7  Exercise 8    Exercise :    Low Row on SB     Codman: CW, CCW, flexion  AAROM: scaption in L sidelying with elvated SB     Repetition/Time :    20 x 3 sec    20 20    Resist or Assist :    No resistance        Comment :    To neutral        Done :    Yes    yes yes                    Exercise 9  Exercise 10  Exercise 11  Exercise 12    Exercise :          Repetition/Time :          Resist or Assist :          Comment :          Done :                         Patient Education and Home Exercises     Home Exercises Provided and Patient Education Provided     Education provided:   - HEP     Written Home Exercises Provided: yes. Exercises were reviewed and Gayle was able to demonstrate them prior to the end of the session.  Gayle demonstrated good  understanding of the education provided. See EMR under Patient Instructions for exercises provided during therapy sessions    ASSESSMENT     Gayle tolerated treatment well today with no reports of discomfort during session . Min verbal and tactile cues for proper completion of exercises. Smooth ROM in allowed range.     Gayle Is progressing well towards her goals.   Pt prognosis is Excellent.     Pt will continue to benefit from skilled outpatient physical therapy to address the deficits listed in the problem list box on initial evaluation, provide pt/family education and to maximize pt's level of independence in the home and community environment.     Pt's spiritual, cultural and educational needs considered and pt agreeable to plan of care and goals.     Anticipated barriers to physical therapy: none     Goals:  Short Term Goals (3 Weeks):   1. Patient will be compliant with home exercise program to assist therapy in restoring pain free motion of the shoulder.   2. Patient will improve impaired R elbow/wrist/hand  manual muscle tests 1/2 grade to improve strength for functional tasks.  3. Patient will improve R PROM shoulder flexion >/= 20 degrees to improve functional mobility of upper extremities.  4. Patient will improve R PROM shoulder abduction >/= 20 degrees to improve functional  mobility of upper extremities.      Long Term Goals (6 Weeks):   1. Patient will improve FOTO score to </= 63% to demonstrate improvements in R UE carrying, moving, and handling objects  2. Patient will improve impaired shoulder manual muscle tests grade to 4/5 bilaterally to improve strength for household duties.  3. Patient will improve R PROM shoulder flexion to >/= 140 degrees to improve functional mobility of upper extremities.   4. Patient will improve R PROM shoulder abduction to >/= 140 degrees to improve functional mobility of upper extremities.  5. Patient will report pain level </= 2/10 following completion of a functional activity.     PLAN     Outpatient Physical Therapy 3 times weekly for 6 weeks to include the following interventions: Manual Therapy, Neuromuscular Re-ed, Patient Education, Therapeutic Activities, and Therapeutic Exercise.     Ruslan Mcnamara, PT

## 2022-11-21 ENCOUNTER — CLINICAL SUPPORT (OUTPATIENT)
Dept: REHABILITATION | Facility: HOSPITAL | Age: 62
End: 2022-11-21
Payer: MEDICAID

## 2022-11-21 DIAGNOSIS — M25.511 RIGHT SHOULDER PAIN, UNSPECIFIED CHRONICITY: Primary | ICD-10-CM

## 2022-11-21 DIAGNOSIS — M25.611 DECREASED ROM OF RIGHT SHOULDER: ICD-10-CM

## 2022-11-21 DIAGNOSIS — R29.898 IMPAIRED STRENGTH OF SHOULDER MUSCLES: ICD-10-CM

## 2022-11-21 PROCEDURE — 97110 THERAPEUTIC EXERCISES: CPT | Mod: CQ

## 2022-11-21 NOTE — PROGRESS NOTES
[facility]   Physical Therapy Treatment Note     Name: Gayle Gold  Clinic Number: [id number]    Therapy Diagnosis:   Encounter Diagnoses   Name Primary?    Right shoulder pain, unspecified chronicity Yes    Decreased ROM of right shoulder     Impaired strength of shoulder muscles      Physician: Tammy Pillai PA*    Visit Date: 11/21/2022    [Physician Orders: PT Eval and Treat   Medical Diagnosis from Referral: S46.011D Traumatic complete tear of R rotator cuff / Z98.890 S/P Right rotator cuff repair   Evaluation Date: 11/3/2022  Authorization Period Expiration: 12/22/2022  Plan of Care Expiration: 12/15/2022  Reassessment / POC Due: 12/02/2022  Visit # / Visits authorized: 6/ 18     Time In: 0800  Time Out: 0833  Total Billable Time: 33 minutes    SUBJECTIVE     Pt reports: no pain in R shoulder, but has some soreness in R upper trap.   She was compliant with home exercise program.  Response to previous treatment: good   Functional change: no pain     Pain: 3/10 without anything for pain  Location: right upper trap     OBJECTIVE     Objective Measures updated at progress report unless specified.     Treatment     Gayle received the treatments listed below:      therapeutic exercises to develop strength, ROM, flexibility, and posture for 33 minutes including:  Therapeutic Exercise   Therapeutic Exercise Grid     Exercise 1  Exercise 2  Exercise 3  Exercise 4    Exercise :    AROM: wrist/ Elbow  PROM: Fwd Elevate <90 ER<20 scap plane     Table Slides : Flex / scaption    Scapula retraction      Repetition/Time :    x 20   x 20   x 20   20 x 5 sec      Resist or Assist :    No resistance   No resistance   No resistance   No resistance     Comment :                Done :    Yes   Yes  Yes  Yes                   Exercise 5  Exercise 6  Exercise 7  Exercise 8    Exercise :    Low Row on SB     Codman: CW, CCW, flexion  AAROM: scaption in L sidelying with elvated SB     Repetition/Time :    20 x 3 sec    20 20    Resist or Assist :    No resistance        Comment :    To neutral        Done :    Yes    yes yes                    Exercise 9  Exercise 10  Exercise 11  Exercise 12    Exercise :          Repetition/Time :          Resist or Assist :          Comment :          Done :                         Patient Education and Home Exercises     Home Exercises Provided and Patient Education Provided     Education provided:   - HEP     Written Home Exercises Provided: yes. Exercises were reviewed and Gayle was able to demonstrate them prior to the end of the session.  Gayle demonstrated good  understanding of the education provided. See EMR under Patient Instructions for exercises provided during therapy sessions    ASSESSMENT     Patient completed all exercises with good effort. Verbal cues needed for form. No increased pain reported during session.    Gayle Is progressing well towards her goals.   Pt prognosis is Excellent.     Pt will continue to benefit from skilled outpatient physical therapy to address the deficits listed in the problem list box on initial evaluation, provide pt/family education and to maximize pt's level of independence in the home and community environment.     Pt's spiritual, cultural and educational needs considered and pt agreeable to plan of care and goals.     Anticipated barriers to physical therapy: none     Goals:  Short Term Goals (3 Weeks):   1. Patient will be compliant with home exercise program to assist therapy in restoring pain free motion of the shoulder.   2. Patient will improve impaired R elbow/wrist/hand  manual muscle tests 1/2 grade to improve strength for functional tasks.  3. Patient will improve R PROM shoulder flexion >/= 20 degrees to improve functional mobility of upper extremities.  4. Patient will improve R PROM shoulder abduction >/= 20 degrees to improve functional mobility of upper extremities.      Long Term Goals (6  Weeks):   1. Patient will improve FOTO score to </= 63% to demonstrate improvements in R UE carrying, moving, and handling objects  2. Patient will improve impaired shoulder manual muscle tests grade to 4/5 bilaterally to improve strength for household duties.  3. Patient will improve R PROM shoulder flexion to >/= 140 degrees to improve functional mobility of upper extremities.   4. Patient will improve R PROM shoulder abduction to >/= 140 degrees to improve functional mobility of upper extremities.  5. Patient will report pain level </= 2/10 following completion of a functional activity.     PLAN     Outpatient Physical Therapy 3 times weekly for 6 weeks to include the following interventions: Manual Therapy, Neuromuscular Re-ed, Patient Education, Therapeutic Activities, and Therapeutic Exercise.     Gabino Jc, PTA

## 2022-11-22 ENCOUNTER — CLINICAL SUPPORT (OUTPATIENT)
Dept: REHABILITATION | Facility: HOSPITAL | Age: 62
End: 2022-11-22
Payer: MEDICARE

## 2022-11-22 DIAGNOSIS — R29.898 IMPAIRED STRENGTH OF SHOULDER MUSCLES: ICD-10-CM

## 2022-11-22 DIAGNOSIS — M25.511 RIGHT SHOULDER PAIN, UNSPECIFIED CHRONICITY: Primary | ICD-10-CM

## 2022-11-22 DIAGNOSIS — M25.611 DECREASED ROM OF RIGHT SHOULDER: ICD-10-CM

## 2022-11-22 PROCEDURE — 97110 THERAPEUTIC EXERCISES: CPT | Mod: CQ

## 2022-11-22 NOTE — PROGRESS NOTES
[facility]   Physical Therapy Treatment Note     Name: Gayle Gold  Clinic Number: [id number]    Therapy Diagnosis:   Encounter Diagnoses   Name Primary?    Right shoulder pain, unspecified chronicity Yes    Decreased ROM of right shoulder     Impaired strength of shoulder muscles        Physician: Tammy Pillai PA*    Visit Date: 11/22/2022    [Physician Orders: PT Eval and Treat   Medical Diagnosis from Referral: S46.011D Traumatic complete tear of R rotator cuff / Z98.890 S/P Right rotator cuff repair   Evaluation Date: 11/3/2022  Authorization Period Expiration: 12/22/2022  Plan of Care Expiration: 12/15/2022  Reassessment / POC Due: 12/02/2022  Visit # / Visits authorized: 8/ 18     PTA Visit: 2/5    Time In: 0830  Time Out: 0900  Total Billable Time: 30 minutes    SUBJECTIVE     Pt reports: no pain in R shoulder, soreness in R upper trap has diminished some.  She was compliant with home exercise program.  Response to previous treatment: good   Functional change: no pain     Pain: 2/10 without anything for pain  Location: right upper trap     OBJECTIVE     Objective Measures updated at progress report unless specified.     Treatment     Gayle received the treatments listed below:      therapeutic exercises to develop strength, ROM, flexibility, and posture for 30 minutes including:  Therapeutic Exercise   Therapeutic Exercise Grid     Exercise 1  Exercise 2  Exercise 3  Exercise 4    Exercise :    AROM: wrist/ Elbow  PROM: Fwd Elevate <90 ER<20 scap plane     Table Slides : Flex / scaption    Scapula retraction      Repetition/Time :    x 20   x 20   x 20   20 x 5 sec      Resist or Assist :    No resistance   No resistance   No resistance   No resistance     Comment :                Done :    Yes   Yes  Yes  Yes                   Exercise 5  Exercise 6  Exercise 7  Exercise 8    Exercise :    Low Row on SB     Codman: CW, CCW, flexion  AAROM: scaption in L sidelying with  elvated SB    Repetition/Time :    20 x 3 sec    20 20    Resist or Assist :    No resistance        Comment :    To neutral        Done :    Yes    yes yes                    Exercise 9  Exercise 10  Exercise 11  Exercise 12    Exercise :          Repetition/Time :          Resist or Assist :          Comment :          Done :                         Patient Education and Home Exercises     Home Exercises Provided and Patient Education Provided     Education provided:   - HEP     Written Home Exercises Provided: yes. Exercises were reviewed and Gayle was able to demonstrate them prior to the end of the session.  Gayle demonstrated good  understanding of the education provided. See EMR under Patient Instructions for exercises provided during therapy sessions    ASSESSMENT     Patient completed all exercises with good effort. Verbal cues needed for form. No increased pain reported during session.    Gayle Is progressing well towards her goals.   Pt prognosis is Excellent.     Pt will continue to benefit from skilled outpatient physical therapy to address the deficits listed in the problem list box on initial evaluation, provide pt/family education and to maximize pt's level of independence in the home and community environment.     Pt's spiritual, cultural and educational needs considered and pt agreeable to plan of care and goals.     Anticipated barriers to physical therapy: none     Goals:  Short Term Goals (3 Weeks):   1. Patient will be compliant with home exercise program to assist therapy in restoring pain free motion of the shoulder.   2. Patient will improve impaired R elbow/wrist/hand  manual muscle tests 1/2 grade to improve strength for functional tasks.  3. Patient will improve R PROM shoulder flexion >/= 20 degrees to improve functional mobility of upper extremities.  4. Patient will improve R PROM shoulder abduction >/= 20 degrees to improve functional mobility of upper extremities.      Long Term  Goals (6 Weeks):   1. Patient will improve FOTO score to </= 63% to demonstrate improvements in R UE carrying, moving, and handling objects  2. Patient will improve impaired shoulder manual muscle tests grade to 4/5 bilaterally to improve strength for household duties.  3. Patient will improve R PROM shoulder flexion to >/= 140 degrees to improve functional mobility of upper extremities.   4. Patient will improve R PROM shoulder abduction to >/= 140 degrees to improve functional mobility of upper extremities.  5. Patient will report pain level </= 2/10 following completion of a functional activity.     PLAN     Outpatient Physical Therapy 3 times weekly for 6 weeks to include the following interventions: Manual Therapy, Neuromuscular Re-ed, Patient Education, Therapeutic Activities, and Therapeutic Exercise.     Gabino Jc, PTA

## 2022-11-25 ENCOUNTER — CLINICAL SUPPORT (OUTPATIENT)
Dept: REHABILITATION | Facility: HOSPITAL | Age: 62
End: 2022-11-25
Payer: MEDICAID

## 2022-11-25 DIAGNOSIS — M25.611 DECREASED ROM OF RIGHT SHOULDER: ICD-10-CM

## 2022-11-25 DIAGNOSIS — M25.511 RIGHT SHOULDER PAIN, UNSPECIFIED CHRONICITY: Primary | ICD-10-CM

## 2022-11-25 DIAGNOSIS — R29.898 IMPAIRED STRENGTH OF SHOULDER MUSCLES: ICD-10-CM

## 2022-11-25 PROCEDURE — 97110 THERAPEUTIC EXERCISES: CPT | Mod: CQ

## 2022-11-25 NOTE — PROGRESS NOTES
[facility]   Physical Therapy Treatment Note     Name: Gayle Gold  Clinic Number: [id number]    Therapy Diagnosis:   No diagnosis found.      Physician: Tammy Pillai PA*    Visit Date: 11/25/2022    [Physician Orders: PT Eval and Treat   Medical Diagnosis from Referral: S46.011D Traumatic complete tear of R rotator cuff / Z98.890 S/P Right rotator cuff repair   Evaluation Date: 11/3/2022  Authorization Period Expiration: 12/22/2022  Plan of Care Expiration: 12/15/2022  Reassessment / POC Due: 12/02/2022  Visit # / Visits authorized: 9/ 18     PTA Visit: 3/5    Time In: 0830  Time Out: 0900  Total Billable Time: 30 minutes    SUBJECTIVE     Pt reports: no pain in R shoulder today.  She was compliant with home exercise program.  Response to previous treatment: good   Functional change: no pain     Pain: 0/10 without anything for pain  Location: right upper trap     OBJECTIVE     Objective Measures updated at progress report unless specified.     Treatment     Gayle received the treatments listed below:      therapeutic exercises to develop strength, ROM, flexibility, and posture for 30 minutes including:  Therapeutic Exercise   Therapeutic Exercise Grid     Exercise 1  Exercise 2  Exercise 3  Exercise 4    Exercise :    AROM: wrist/ Elbow  PROM: Fwd Elevate <90 ER<20 scap plane     Table Slides : Flex / scaption    Scapula retraction      Repetition/Time :    x 20   x 20   x 20   20 x 5 sec      Resist or Assist :    No resistance   No resistance   No resistance   No resistance     Comment :                Done :    Yes   Yes  Yes  Yes                   Exercise 5  Exercise 6  Exercise 7  Exercise 8    Exercise :    Low Row on SB     Codman: CW, CCW, flexion  AAROM: scaption in L sidelying with elvated SB    Repetition/Time :    20 x 3 sec    20 20    Resist or Assist :    No resistance        Comment :    To neutral        Done :    Yes    yes yes                    Exercise  9  Exercise 10  Exercise 11  Exercise 12    Exercise :          Repetition/Time :          Resist or Assist :          Comment :          Done :                         Patient Education and Home Exercises     Home Exercises Provided and Patient Education Provided     Education provided:   - HEP     Written Home Exercises Provided: yes. Exercises were reviewed and Gayle was able to demonstrate them prior to the end of the session.  Gayle demonstrated good  understanding of the education provided. See EMR under Patient Instructions for exercises provided during therapy sessions    ASSESSMENT     Patient completed all exercises with good effort. Verbal cues needed for form. No increased pain reported during session.    Gayle Is progressing well towards her goals.   Pt prognosis is Excellent.     Pt will continue to benefit from skilled outpatient physical therapy to address the deficits listed in the problem list box on initial evaluation, provide pt/family education and to maximize pt's level of independence in the home and community environment.     Pt's spiritual, cultural and educational needs considered and pt agreeable to plan of care and goals.     Anticipated barriers to physical therapy: none     Goals:  Short Term Goals (3 Weeks):   1. Patient will be compliant with home exercise program to assist therapy in restoring pain free motion of the shoulder.   2. Patient will improve impaired R elbow/wrist/hand  manual muscle tests 1/2 grade to improve strength for functional tasks.  3. Patient will improve R PROM shoulder flexion >/= 20 degrees to improve functional mobility of upper extremities.  4. Patient will improve R PROM shoulder abduction >/= 20 degrees to improve functional mobility of upper extremities.      Long Term Goals (6 Weeks):   1. Patient will improve FOTO score to </= 63% to demonstrate improvements in R UE carrying, moving, and handling objects  2. Patient will improve impaired shoulder  manual muscle tests grade to 4/5 bilaterally to improve strength for household duties.  3. Patient will improve R PROM shoulder flexion to >/= 140 degrees to improve functional mobility of upper extremities.   4. Patient will improve R PROM shoulder abduction to >/= 140 degrees to improve functional mobility of upper extremities.  5. Patient will report pain level </= 2/10 following completion of a functional activity.     PLAN     Outpatient Physical Therapy 3 times weekly for 6 weeks to include the following interventions: Manual Therapy, Neuromuscular Re-ed, Patient Education, Therapeutic Activities, and Therapeutic Exercise.     Gabino Jc, PTA

## 2022-11-28 ENCOUNTER — OFFICE VISIT (OUTPATIENT)
Dept: ORTHOPEDICS | Facility: CLINIC | Age: 62
End: 2022-11-28
Payer: MEDICARE

## 2022-11-28 VITALS — TEMPERATURE: 99 F | SYSTOLIC BLOOD PRESSURE: 124 MMHG | HEART RATE: 93 BPM | DIASTOLIC BLOOD PRESSURE: 78 MMHG

## 2022-11-28 DIAGNOSIS — S46.011D TRAUMATIC COMPLETE TEAR OF RIGHT ROTATOR CUFF, SUBSEQUENT ENCOUNTER: Primary | ICD-10-CM

## 2022-11-28 PROCEDURE — 3078F DIAST BP <80 MM HG: CPT | Mod: CPTII,,,

## 2022-11-28 PROCEDURE — 1160F RVW MEDS BY RX/DR IN RCRD: CPT | Mod: CPTII,,,

## 2022-11-28 PROCEDURE — 4010F PR ACE/ARB THEARPY RXD/TAKEN: ICD-10-PCS | Mod: CPTII,,,

## 2022-11-28 PROCEDURE — 1160F PR REVIEW ALL MEDS BY PRESCRIBER/CLIN PHARMACIST DOCUMENTED: ICD-10-PCS | Mod: CPTII,,,

## 2022-11-28 PROCEDURE — 99024 POSTOP FOLLOW-UP VISIT: CPT | Mod: ,,,

## 2022-11-28 PROCEDURE — 3074F PR MOST RECENT SYSTOLIC BLOOD PRESSURE < 130 MM HG: ICD-10-PCS | Mod: CPTII,,,

## 2022-11-28 PROCEDURE — 3078F PR MOST RECENT DIASTOLIC BLOOD PRESSURE < 80 MM HG: ICD-10-PCS | Mod: CPTII,,,

## 2022-11-28 PROCEDURE — 1159F MED LIST DOCD IN RCRD: CPT | Mod: CPTII,,,

## 2022-11-28 PROCEDURE — 1159F PR MEDICATION LIST DOCUMENTED IN MEDICAL RECORD: ICD-10-PCS | Mod: CPTII,,,

## 2022-11-28 PROCEDURE — 3074F SYST BP LT 130 MM HG: CPT | Mod: CPTII,,,

## 2022-11-28 PROCEDURE — 99024 PR POST-OP FOLLOW-UP VISIT: ICD-10-PCS | Mod: ,,,

## 2022-11-28 PROCEDURE — 4010F ACE/ARB THERAPY RXD/TAKEN: CPT | Mod: CPTII,,,

## 2022-11-29 ENCOUNTER — CLINICAL SUPPORT (OUTPATIENT)
Dept: REHABILITATION | Facility: HOSPITAL | Age: 62
End: 2022-11-29
Payer: MEDICAID

## 2022-11-29 ENCOUNTER — ANTI-COAG VISIT (OUTPATIENT)
Dept: CARDIOLOGY | Facility: HOSPITAL | Age: 62
End: 2022-11-29
Payer: MEDICARE

## 2022-11-29 DIAGNOSIS — R29.898 IMPAIRED STRENGTH OF SHOULDER MUSCLES: ICD-10-CM

## 2022-11-29 DIAGNOSIS — Z86.718 PERSONAL HISTORY OF DVT (DEEP VEIN THROMBOSIS): ICD-10-CM

## 2022-11-29 DIAGNOSIS — Z86.711 PERSONAL HISTORY OF PE (PULMONARY EMBOLISM): ICD-10-CM

## 2022-11-29 DIAGNOSIS — M25.511 RIGHT SHOULDER PAIN, UNSPECIFIED CHRONICITY: Primary | ICD-10-CM

## 2022-11-29 DIAGNOSIS — M25.611 DECREASED ROM OF RIGHT SHOULDER: ICD-10-CM

## 2022-11-29 LAB — INR PPP: 1.7 (ref 2–3)

## 2022-11-29 PROCEDURE — 85610 PROTHROMBIN TIME: CPT

## 2022-11-29 PROCEDURE — 97110 THERAPEUTIC EXERCISES: CPT | Mod: CQ

## 2022-11-29 NOTE — PROGRESS NOTES
[facility]   Physical Therapy Treatment Note     Name: Gayle Gold  Clinic Number: [id number]    Therapy Diagnosis:   Encounter Diagnoses   Name Primary?    Right shoulder pain, unspecified chronicity Yes    Decreased ROM of right shoulder     Impaired strength of shoulder muscles          Physician: Tammy Pillai PA*    Visit Date: 11/29/2022    [Physician Orders: PT Eval and Treat   Medical Diagnosis from Referral: S46.011D Traumatic complete tear of R rotator cuff / Z98.890 S/P Right rotator cuff repair   Evaluation Date: 11/3/2022  Authorization Period Expiration: 12/22/2022  Plan of Care Expiration: 12/15/2022  Reassessment / POC Due: 12/02/2022  Visit # / Visits authorized: 10/ 18     PTA Visit: 1/5    Time In: 0820  Time Out: 0858  Total Billable Time: 38 minutes    SUBJECTIVE     Pt reports: Dr. Jay d/c her sling and advised her to continue PT to improve ROM and can begin gradual strengthening. Will follow up again in 6 wks.  She was compliant with home exercise program.  Response to previous treatment: good   Functional change: no pain     Pain: 0/10 without anything for pain  Location: right upper trap     OBJECTIVE     Objective Measures updated at progress report unless specified.     Treatment     Gayle received the treatments listed below:      therapeutic exercises to develop strength, ROM, flexibility, and posture for 38 minutes including:  Therapeutic Exercise   Therapeutic Exercise Grid     Exercise 1  Exercise 2  Exercise 3  Exercise 4    Exercise :    AROM: wrist/ Elbow  PROM: Fwd Elevate <90 ER<20 scap plane     Table Slides : Flex / scaption    Scapula retraction      Repetition/Time :    x 20   x 20   x 20   20 x 5 sec      Resist or Assist :    No resistance   No resistance   Increased table incline to about 20 deg   On the wall with small ball between scapulas for cueing.     Comment :                Done :    Yes   Yes  Yes  Yes                    Exercise 5  Exercise 6  Exercise 7  Exercise 8    Exercise :    Low Row on SB     Codman: CW, CCW, flexion  AAROM: scaption in L sidelying with elvated SB AAROM with dowel in scaption   Repetition/Time :    20 x 5 sec    20 20 10   Resist or Assist :    No resistance        Comment :    To neutral        Done :    Yes    no yes yes                   Exercise 9  Exercise 10  Exercise 11  Exercise 12    Exercise :    Rythmic Stabilization      Repetition/Time :    3 x 1 min      Resist or Assist :          Comment :          Done :    yes                     Patient Education and Home Exercises     Home Exercises Provided and Patient Education Provided     Education provided:   - HEP     Written Home Exercises Provided: yes. Exercises were reviewed and Gayle was able to demonstrate them prior to the end of the session.  Gayle demonstrated good  understanding of the education provided. See EMR under Patient Instructions for exercises provided during therapy sessions    ASSESSMENT     Patient completed all exercises with good effort. Increased incline of table for slides, and added AAROM with dowel, rhythmic stabilization, and increased reps of some exercises as noted with good tolerance. Dr. Daley advised gradual strengthening as ROM improves. Verbal cues needed for form. No increased pain reported during session.    Gayle Is progressing well towards her goals.   Pt prognosis is Excellent.     Pt will continue to benefit from skilled outpatient physical therapy to address the deficits listed in the problem list box on initial evaluation, provide pt/family education and to maximize pt's level of independence in the home and community environment.     Pt's spiritual, cultural and educational needs considered and pt agreeable to plan of care and goals.     Anticipated barriers to physical therapy: none     Goals:  Short Term Goals (3 Weeks):   1. Patient will be compliant with home exercise program to assist therapy  in restoring pain free motion of the shoulder.   2. Patient will improve impaired R elbow/wrist/hand  manual muscle tests 1/2 grade to improve strength for functional tasks.  3. Patient will improve R PROM shoulder flexion >/= 20 degrees to improve functional mobility of upper extremities.  4. Patient will improve R PROM shoulder abduction >/= 20 degrees to improve functional mobility of upper extremities.      Long Term Goals (6 Weeks):   1. Patient will improve FOTO score to </= 63% to demonstrate improvements in R UE carrying, moving, and handling objects  2. Patient will improve impaired shoulder manual muscle tests grade to 4/5 bilaterally to improve strength for household duties.  3. Patient will improve R PROM shoulder flexion to >/= 140 degrees to improve functional mobility of upper extremities.   4. Patient will improve R PROM shoulder abduction to >/= 140 degrees to improve functional mobility of upper extremities.  5. Patient will report pain level </= 2/10 following completion of a functional activity.     PLAN     Outpatient Physical Therapy 3 times weekly for 6 weeks to include the following interventions: Manual Therapy, Neuromuscular Re-ed, Patient Education, Therapeutic Activities, and Therapeutic Exercise.     Gabino Jc, PTA

## 2022-11-30 ENCOUNTER — CLINICAL SUPPORT (OUTPATIENT)
Dept: REHABILITATION | Facility: HOSPITAL | Age: 62
End: 2022-11-30
Payer: MEDICARE

## 2022-11-30 ENCOUNTER — DOCUMENTATION ONLY (OUTPATIENT)
Dept: REHABILITATION | Facility: HOSPITAL | Age: 62
End: 2022-11-30

## 2022-11-30 DIAGNOSIS — R29.898 IMPAIRED STRENGTH OF SHOULDER MUSCLES: ICD-10-CM

## 2022-11-30 DIAGNOSIS — M25.611 DECREASED ROM OF RIGHT SHOULDER: ICD-10-CM

## 2022-11-30 DIAGNOSIS — M25.511 RIGHT SHOULDER PAIN, UNSPECIFIED CHRONICITY: Primary | ICD-10-CM

## 2022-11-30 PROCEDURE — 97110 THERAPEUTIC EXERCISES: CPT

## 2022-11-30 NOTE — PROGRESS NOTES
[facility]   Physical Therapy Treatment Note     Name: Gayle Gold  Clinic Number: [id number]    Therapy Diagnosis:   Encounter Diagnoses   Name Primary?    Right shoulder pain, unspecified chronicity Yes    Decreased ROM of right shoulder     Impaired strength of shoulder muscles      Physician: Tammy Pillai PA*    Visit Date: 11/30/2022    [Physician Orders: PT Eval and Treat   Medical Diagnosis from Referral: S46.011D Traumatic complete tear of R rotator cuff / Z98.890 S/P Right rotator cuff repair   Evaluation Date: 11/3/2022  Authorization Period Expiration: 12/22/2022  Plan of Care Expiration: 12/22/2022  Reassessment / POC Due: 12/02/2022  Visit # / Visits authorized: 11/ 18     Time In: 08:00  Time Out: 08:30  Total Billable Time: 30 minutes    SUBJECTIVE     Pt reports: minimal to no pain in R shoulder, been using R UE more since saw MD earlier this week  She was compliant with home exercise program.  Response to previous treatment: good   Functional change: no pain, improve ROM     Pain: 0/10 with Tylenol for pain  Location: right shoulder area     OBJECTIVE     Objective Measures updated at progress report unless specified.     Treatment     Gayle received the treatments listed below:      therapeutic exercises to develop strength, ROM, flexibility, and posture for 30 minutes including:  Therapeutic Exercise   Therapeutic Exercise Grid     Exercise 1  Exercise 2  Exercise 3  Exercise 4    Exercise :    AROM: wrist/ Elbow  PROM: Fwd Elevate <90 ER<20 scap plane     Table Slides : Flex / scaption, ER    Scapula retraction      Repetition/Time :    x 20   x 20   10 x 5 sec hold at end range   20 x 5 sec      Resist or Assist :    No resistance   No resistance    On the wall with small ball between scapulas for cueing.     Comment :                Done :    no  no  Yes  no                   Exercise 5  Exercise 6  Exercise 7  Exercise 8    Exercise :    SB: Low Row,  horizontal abduction    Dowel: flexion, serratus punches  AROM: scaption, flexion Wall slides: semi-circles, flexion   Repetition/Time :    20    20 5    Resist or Assist :    No resistance        Comment :     supine     Done :    Yes    yes yes no                   Exercise 9  Exercise 10  Exercise 11  Exercise 12    Exercise :    Rythmic Stabilization      Repetition/Time :    3 x 1 min      Resist or Assist :          Comment :          Done :    yes                     Patient Education and Home Exercises     Home Exercises Provided and Patient Education Provided     Education provided:   - HEP     Written Home Exercises Provided: yes. Exercises were reviewed and Gayle was able to demonstrate them prior to the end of the session.  Gayle demonstrated good  understanding of the education provided. See EMR under Patient Instructions for exercises provided during therapy sessions    ASSESSMENT     POC upgraded with R shoulder AROM exercises added using gravity as resistance performed with some difficulty due to muscle weakness with cues to avoid hiking of R shoulder as compensatory strategy. Instructed pt on exercises to add to HEP to improve ROM and strengthening    Gayle Is progressing well towards her goals.   Pt prognosis is Excellent.     Pt will continue to benefit from skilled outpatient physical therapy to address the deficits listed in the problem list box on initial evaluation, provide pt/family education and to maximize pt's level of independence in the home and community environment.     Pt's spiritual, cultural and educational needs considered and pt agreeable to plan of care and goals.     Anticipated barriers to physical therapy: none     Goals:  Short Term Goals (3 Weeks):   1. Patient will be compliant with home exercise program to assist therapy in restoring pain free motion of the shoulder.   2. Patient will improve impaired R elbow/wrist/hand  manual muscle tests 1/2 grade to improve strength  for functional tasks.  3. Patient will improve R PROM shoulder flexion >/= 20 degrees to improve functional mobility of upper extremities.  4. Patient will improve R PROM shoulder abduction >/= 20 degrees to improve functional mobility of upper extremities.      Long Term Goals (6 Weeks):   1. Patient will improve FOTO score to </= 63% to demonstrate improvements in R UE carrying, moving, and handling objects  2. Patient will improve impaired shoulder manual muscle tests grade to 4/5 bilaterally to improve strength for household duties.  3. Patient will improve R PROM shoulder flexion to >/= 140 degrees to improve functional mobility of upper extremities.   4. Patient will improve R PROM shoulder abduction to >/= 140 degrees to improve functional mobility of upper extremities.  5. Patient will report pain level </= 2/10 following completion of a functional activity.     PLAN     Outpatient Physical Therapy 3 times weekly for 6 weeks to include the following interventions: Manual Therapy, Neuromuscular Re-ed, Patient Education, Therapeutic Activities, and Therapeutic Exercise.     Juanita Garcia, PT

## 2022-12-01 ENCOUNTER — CLINICAL SUPPORT (OUTPATIENT)
Dept: REHABILITATION | Facility: HOSPITAL | Age: 62
End: 2022-12-01
Payer: MEDICAID

## 2022-12-01 DIAGNOSIS — M25.611 DECREASED ROM OF RIGHT SHOULDER: ICD-10-CM

## 2022-12-01 DIAGNOSIS — R29.898 IMPAIRED STRENGTH OF SHOULDER MUSCLES: ICD-10-CM

## 2022-12-01 DIAGNOSIS — M25.511 RIGHT SHOULDER PAIN, UNSPECIFIED CHRONICITY: Primary | ICD-10-CM

## 2022-12-01 PROCEDURE — 97110 THERAPEUTIC EXERCISES: CPT

## 2022-12-01 NOTE — PLAN OF CARE
[facility]  Physical Therapy Plan of Care Note    Name: Gayle Gold  Clinic Number: [id number]    Therapy Diagnosis:   Encounter Diagnoses   Name Primary?    Right shoulder pain, unspecified chronicity Yes    Decreased ROM of right shoulder     Impaired strength of shoulder muscles      Physician: Tammy Pillai PA*    Visit Date: 12/1/2022  Time In:0800  Time Out:0835  Total billable minutes: 35    Physician Orders: PT eval and treat  Medical Diagnosis from Referral:  S46.011D Traumatic complete tear of R rotator cuff / Z98.890 S/P R rotator cuff repair    Evaluation Date:11/3/2022  Authorization Period Expiration: 12/22/2022  Reassessment / POC completed: 12/01/202212/18  Visit # / Visits authorized: 12/ 18      Precautions: Standard  Functional Level Prior to Evaluation:   IND     SUBJECTIVE     Pt reports: that R shoulder is progressing well. States that pain is minimal with movement.   She was compliant with home exercise program.  Response to previous treatment: good   Functional change: decreased pain     Pain: 0/10  Location: right shoulder      OBJECTIVE     Update:     Posture: rounded shoulders B    Palpation: mild muscle guarding  R periscapula area  Sensation: light touch intact      Range of Motion/Strength:      Shoulder Right Left Pain/Dysfunction with Movement   PROM   WNL     flexion  120*       extension  NT       abduction  70*       adduction  NT       Internal rotation  30*   @45 deg    ER at 90° abd  40*   @45 deg    ER at 0° abd  45*             U/E MMT Right Left Pain/Dysfunction with Movement   Shoulder Flexion 3/5 5/5     Shoulder Extension 3/5 5/5     Shoulder Abduction 3/5 5/5     Shoulder Adduction 3/5 5/5     Shoulder Internal Rotation 3/5 5/5     Shoulder External Rotation 3/5 5/5     Shoulder ER  @ 90* Abduction 3/5 5/5     Elbow Flexion  4/5 5/5     Elbow Extension 4/5 5/5     Rhomboids 3/5 5/5     Mid Traps 3/5 5/5     Low Traps 3/5 5/5           Treatment:   Therapeutic Exercise Grid     Exercise 1  Exercise 2  Exercise 3  Exercise 4    Exercise :    AROM: wrist/ Elbow  PROM: Fwd Elevate <90 ER<20 scap plane     Table Slides : Flex / scaption, ER    Scapula retraction      Repetition/Time :    x 20   x 20   10 x 5 sec hold at end range   20 x 5 sec      Resist or Assist :    No resistance   No resistance     On the wall with small ball between scapulas for cueing.     Comment :                 Done :    no  no  Yes  no                     Exercise 5  Exercise 6  Exercise 7  Exercise 8    Exercise :    SB: Low Row, horizontal abduction    Dowel: flexion, serratus punches  AROM: scaption, flexion Wall slides: semi-circles, flexion   Repetition/Time :    20    20 5     Resist or Assist :    No resistance           Comment :      supine       Done :    Yes    yes yes no                    Exercise 9  Exercise 10  Exercise 11  Exercise 12    Exercise :    Rythmic Stabilization         Repetition/Time :    3 x 1 min         Resist or Assist :              Comment :              Done :    yes                          ASSESSMENT     Update:   - Gayle has progressed well with therapy up to this point in her rehabilitation as demonstrated by improved R shoulder AROM/PROM and strength. She is 7 weeks post- op and ready to initiate advanced exercises.       Goals:  Short Term Goals (3 Weeks):   1. Patient will be compliant with home exercise program to assist therapy in restoring pain free motion of the shoulder. - MET  2. Patient will improve impaired R elbow/wrist/hand  manual muscle tests 1/2 grade to improve strength for functional tasks.- MET  3. Patient will improve R PROM shoulder flexion >/= 20 degrees to improve functional mobility of upper extremities.- MET  4. Patient will improve R PROM shoulder abduction >/= 20 degrees to improve functional mobility of upper extremities. -MET     Long Term Goals (6 Weeks):   1. Patient will improve FOTO score to </=  63% to demonstrate improvements in R UE carrying, moving, and handling objects  2. Patient will improve impaired shoulder manual muscle tests grade to 4/5 bilaterally to improve strength for household duties.  3. Patient will improve R PROM shoulder flexion to >/= 140 degrees to improve functional mobility of upper extremities.   4. Patient will improve R PROM shoulder abduction to >/= 140 degrees to improve functional mobility of upper extremities.  5. Patient will report pain level </= 2/10 following completion of a functional activity.      PLAN       Reasons for Recertification of Therapy:   Gayle is 7 weeks post -op and has met requirements to move into more advanced rehab of the R shoulder. Deficits remain in AROM and strength of R shoulder needed to safely return to work .     PLAN     Recommended Treatment Plan: 3 times per week for 4 weeks beginning 12/19/2022 to 01/13/2023 :  Neuromuscular Re-ed, Patient Education, Therapeutic Activities, and Therapeutic Exercise      Ruslan Mcnamara, PT

## 2022-12-06 ENCOUNTER — CLINICAL SUPPORT (OUTPATIENT)
Dept: REHABILITATION | Facility: HOSPITAL | Age: 62
End: 2022-12-06
Payer: MEDICARE

## 2022-12-06 DIAGNOSIS — M25.611 DECREASED ROM OF RIGHT SHOULDER: ICD-10-CM

## 2022-12-06 DIAGNOSIS — R29.898 IMPAIRED STRENGTH OF SHOULDER MUSCLES: ICD-10-CM

## 2022-12-06 DIAGNOSIS — M25.511 RIGHT SHOULDER PAIN, UNSPECIFIED CHRONICITY: Primary | ICD-10-CM

## 2022-12-06 PROCEDURE — 97110 THERAPEUTIC EXERCISES: CPT | Mod: CQ

## 2022-12-06 NOTE — PROGRESS NOTES
[facility]   Physical Therapy Treatment Note     Name: Gayle Gold  Clinic Number: [id number]    Therapy Diagnosis:   Encounter Diagnoses   Name Primary?    Right shoulder pain, unspecified chronicity Yes    Decreased ROM of right shoulder     Impaired strength of shoulder muscles        Physician: Tammy Pillai PA*    Visit Date: 12/6/2022      Physician Orders: PT eval and treat  Medical Diagnosis from Referral:  S46.011D Traumatic complete tear of R rotator cuff / Z98.890 S/P R rotator cuff repair    Evaluation Date:11/3/2022  Authorization Period Expiration: 12/22/2022  Reassessment / POC completed: 12/01/202212/18  Visit # / Visits authorized: 13/ 18  FOTO Score: 56    PTA Visit: 1/5    Time In: 08:00  Time Out: 08:30  Total Billable Time: 30 minutes    SUBJECTIVE     Pt reports: she has a little bit of an ache in the shoulder today, but not bad.   She was compliant with home exercise program.  Response to previous treatment: good   Functional change:  improve ROM     Pain: 1/10 with Tylenol for pain  Location: right shoulder area     OBJECTIVE     Objective Measures updated at progress report unless specified.     Treatment     Gayle received the treatments listed below:      therapeutic exercises to develop strength, ROM, flexibility, and posture for 30 minutes including:  Therapeutic Exercise   Therapeutic Exercise Grid     Exercise 1  Exercise 2  Exercise 3  Exercise 4    Exercise :    AROM: wrist/ Elbow  PROM: Fwd Elevate <90 ER<20 scap plane     Table Slides : Flex / scaption, ER    Scapula retraction      Repetition/Time :    x 20   x 20   10 x 5 sec hold at end range   20 x 5 sec      Resist or Assist :    No resistance   No resistance    On the wall with small ball between scapulas for cueing.     Comment :                Done :    no  no  Yes  no                   Exercise 5  Exercise 6  Exercise 7  Exercise 8    Exercise :    SB: Low Row, horizontal abduction     Dowel: flexion, serratus punches  AROM: scaption, flexion Wall slides: semi-circles, flexion   Repetition/Time :    20    20 5    Resist or Assist :    No resistance        Comment :     supine     Done :    Yes    yes yes no                   Exercise 9  Exercise 10  Exercise 11  Exercise 12    Exercise :    Rythmic Stabilization      Repetition/Time :    3 x 1 min      Resist or Assist :          Comment :          Done :    yes                     Patient Education and Home Exercises     Home Exercises Provided and Patient Education Provided     Education provided:   - HEP     Written Home Exercises Provided: yes. Exercises were reviewed and Gayle was able to demonstrate them prior to the end of the session.  Gayle demonstrated good  understanding of the education provided. See EMR under Patient Instructions for exercises provided during therapy sessions    ASSESSMENT     Continued with exercises as prescribed in updated POC with good tolerance. Verbal cues required for form with some exercises. Continues to need cues to avoid shoulder hiking of R shoulder during AROM.     Gayle Is progressing well towards her goals.   Pt prognosis is Excellent.     Pt will continue to benefit from skilled outpatient physical therapy to address the deficits listed in the problem list box on initial evaluation, provide pt/family education and to maximize pt's level of independence in the home and community environment.     Pt's spiritual, cultural and educational needs considered and pt agreeable to plan of care and goals.     Anticipated barriers to physical therapy: none     Goals:  Short Term Goals (3 Weeks):   1. Patient will be compliant with home exercise program to assist therapy in restoring pain free motion of the shoulder. - MET  2. Patient will improve impaired R elbow/wrist/hand  manual muscle tests 1/2 grade to improve strength for functional tasks.- MET  3. Patient will improve R PROM shoulder flexion >/= 20  degrees to improve functional mobility of upper extremities.- MET  4. Patient will improve R PROM shoulder abduction >/= 20 degrees to improve functional mobility of upper extremities. -MET     Long Term Goals (6 Weeks):   1. Patient will improve FOTO score to </= 63% to demonstrate improvements in R UE carrying, moving, and handling objects  2. Patient will improve impaired shoulder manual muscle tests grade to 4/5 bilaterally to improve strength for household duties.  3. Patient will improve R PROM shoulder flexion to >/= 140 degrees to improve functional mobility of upper extremities.   4. Patient will improve R PROM shoulder abduction to >/= 140 degrees to improve functional mobility of upper extremities.  5. Patient will report pain level </= 2/10 following completion of a functional activity.      PLAN     Outpatient Physical Therapy 3 times weekly for 6 weeks to include the following interventions: Manual Therapy, Neuromuscular Re-ed, Patient Education, Therapeutic Activities, and Therapeutic Exercise.     Gabino Jc, PTA

## 2022-12-07 ENCOUNTER — CLINICAL SUPPORT (OUTPATIENT)
Dept: REHABILITATION | Facility: HOSPITAL | Age: 62
End: 2022-12-07
Payer: MEDICARE

## 2022-12-07 DIAGNOSIS — M25.611 DECREASED ROM OF RIGHT SHOULDER: ICD-10-CM

## 2022-12-07 DIAGNOSIS — M25.511 RIGHT SHOULDER PAIN, UNSPECIFIED CHRONICITY: Primary | ICD-10-CM

## 2022-12-07 DIAGNOSIS — R29.898 IMPAIRED STRENGTH OF SHOULDER MUSCLES: ICD-10-CM

## 2022-12-07 PROCEDURE — 97110 THERAPEUTIC EXERCISES: CPT

## 2022-12-07 NOTE — PROGRESS NOTES
[facility]   Physical Therapy Treatment Note     Name: Gayle Gold  Clinic Number: [id number]    Therapy Diagnosis:   Encounter Diagnoses   Name Primary?    Right shoulder pain, unspecified chronicity Yes    Decreased ROM of right shoulder     Impaired strength of shoulder muscles        Physician: Tammy Pillai PA*    Visit Date: 12/7/2022      Physician Orders: PT eval and treat  Medical Diagnosis from Referral:  S46.011D Traumatic complete tear of R rotator cuff / Z98.890 S/P R rotator cuff repair    Evaluation Date:11/3/2022  Authorization Period Expiration: 12/22/2022  Reassessment / POC completed: 12/01/202212/18  Visit # / Visits authorized: 14 / 18  FOTO Score: 56    PTA Visit: 1/5    Time In: 08:00  Time Out: 08:35  Total Billable Time: 35 minutes    SUBJECTIVE     Pt reports: that she is doing well today. Ready to progress with rehab so she can return to work.   She was compliant with home exercise program.  Response to previous treatment: good   Functional change:  improve ROM     Pain: 1/10 taking  Tylenol for pain  Location: right shoulder area     OBJECTIVE     Objective Measures updated at progress report unless specified.     Treatment     Gayle received the treatments listed below:      therapeutic exercises to develop strength, ROM, flexibility, and posture for 35 minutes including:  Therapeutic Exercise   Therapeutic Exercise Grid     Exercise 1  Exercise 2  Exercise 3  Exercise 4    Exercise :    PROM: Fwd Elevate <120   ER< 30 scap plane  AAROM Dowel:   Seated shoulder elevation Seated Incline Table slides :   Flex/Scap/ER        Ball Roll on Wall     Repetition/Time :    X 10 X 15 x10 x15   Resist or Assist :          Comment :              Done :    Yes   Yes  Yes  Yes                   Exercise 5  Exercise 6  Exercise 7  Exercise 8    Exercise :    AROM:  Supine flex  Supine punch  Wall slides   Theraband:   Shoulder ext  Seated  rows  Lawnmower  Serratus punch Elbow Theraband :   Biceps curl   Triceps press Scapular retraction    Repetition/Time :    X 10 X 10 10 20 x 3 sec   Resist or Assist :      Yellow     Comment :          Done :    Yes  Yes  yes Yes                    Exercise 9  Exercise 10  Exercise 11  Exercise 12    Exercise :          Repetition/Time :          Resist or Assist :          Comment :          Done :    yes                     Patient Education and Home Exercises     Home Exercises Provided and Patient Education Provided     Education provided:   - HEP     Written Home Exercises Provided: yes. Exercises were reviewed and Gayle was able to demonstrate them prior to the end of the session.  Gayle demonstrated good  understanding of the education provided. See EMR under Patient Instructions for exercises provided during therapy sessions    ASSESSMENT   Gayle tolerated treatment fair today with min-mod reports of discomfort. Increased exercise challenges to include AROM and resisted strengthening per 7 wk post op protocol. Gayle was able to complete all exercises.     Gayle Is progressing well towards her goals.   Pt prognosis is Excellent.     Pt will continue to benefit from skilled outpatient physical therapy to address the deficits listed in the problem list box on initial evaluation, provide pt/family education and to maximize pt's level of independence in the home and community environment.     Pt's spiritual, cultural and educational needs considered and pt agreeable to plan of care and goals.     Anticipated barriers to physical therapy: none     Goals:  Short Term Goals (3 Weeks):   1. Patient will be compliant with home exercise program to assist therapy in restoring pain free motion of the shoulder. - MET  2. Patient will improve impaired R elbow/wrist/hand  manual muscle tests 1/2 grade to improve strength for functional tasks.- MET  3. Patient will improve R PROM shoulder flexion >/= 20 degrees to  improve functional mobility of upper extremities.- MET  4. Patient will improve R PROM shoulder abduction >/= 20 degrees to improve functional mobility of upper extremities. -MET     Long Term Goals (6 Weeks):   1. Patient will improve FOTO score to </= 63% to demonstrate improvements in R UE carrying, moving, and handling objects  2. Patient will improve impaired shoulder manual muscle tests grade to 4/5 bilaterally to improve strength for household duties.  3. Patient will improve R PROM shoulder flexion to >/= 140 degrees to improve functional mobility of upper extremities.   4. Patient will improve R PROM shoulder abduction to >/= 140 degrees to improve functional mobility of upper extremities.  5. Patient will report pain level </= 2/10 following completion of a functional activity.      PLAN     Outpatient Physical Therapy 3 times weekly for 6 weeks to include the following interventions: Manual Therapy, Neuromuscular Re-ed, Patient Education, Therapeutic Activities, and Therapeutic Exercise.     Ruslan Mcnamara, PT

## 2022-12-08 ENCOUNTER — CLINICAL SUPPORT (OUTPATIENT)
Dept: REHABILITATION | Facility: HOSPITAL | Age: 62
End: 2022-12-08
Payer: MEDICAID

## 2022-12-08 DIAGNOSIS — M25.511 RIGHT SHOULDER PAIN, UNSPECIFIED CHRONICITY: Primary | ICD-10-CM

## 2022-12-08 DIAGNOSIS — M25.611 DECREASED ROM OF RIGHT SHOULDER: ICD-10-CM

## 2022-12-08 DIAGNOSIS — R29.898 IMPAIRED STRENGTH OF SHOULDER MUSCLES: ICD-10-CM

## 2022-12-08 PROCEDURE — 97110 THERAPEUTIC EXERCISES: CPT | Mod: CQ

## 2022-12-08 NOTE — PROGRESS NOTES
[facility]   Physical Therapy Treatment Note     Name: Gayle Gold  Clinic Number: [id number]    Therapy Diagnosis:   Encounter Diagnoses   Name Primary?    Right shoulder pain, unspecified chronicity Yes    Decreased ROM of right shoulder     Impaired strength of shoulder muscles          Physician: Tammy Pillai PA*    Visit Date: 12/8/2022      Physician Orders: PT eval and treat  Medical Diagnosis from Referral:  S46.011D Traumatic complete tear of R rotator cuff / Z98.890 S/P R rotator cuff repair    Evaluation Date:11/3/2022  Authorization Period Expiration: 12/22/2022  Reassessment / POC completed: 12/01/2022  Visit # / Visits authorized: 15 / 18  FOTO Score: 56    PTA Visit: 1/5    Time In: 08:00  Time Out: 08:30  Total Billable Time: 30 minutes    SUBJECTIVE     Pt reports: she was not sore after last session.   She was compliant with home exercise program.  Response to previous treatment: good   Functional change:  improve ROM     Pain: 0/10 taking  Tylenol for pain  Location: right shoulder area     OBJECTIVE     Objective Measures updated at progress report unless specified.     Treatment     Gayle received the treatments listed below:      therapeutic exercises to develop strength, ROM, flexibility, and posture for 35 minutes including:  Therapeutic Exercise   Therapeutic Exercise Grid     Exercise 1  Exercise 2  Exercise 3  Exercise 4    Exercise :    PROM: Fwd Elevate <120   ER< 30 scap plane  AAROM Dowel:   Seated shoulder elevation Seated Incline Table slides :   Flex/Scap/ER        Ball Roll on Wall     Repetition/Time :    X 10 X 15 x10 x15   Resist or Assist :          Comment :              Done :    Yes   Yes  Yes  Yes                   Exercise 5  Exercise 6  Exercise 7  Exercise 8    Exercise :    AROM:  Supine flex  Supine punch  Wall slides   Theraband:   Shoulder ext  Seated rows  Lawnmower  Serratus punch Elbow Theraband :   Biceps curl   Triceps  press Scapular retraction    Repetition/Time :    X 10 X 10 10 20 x 3 sec   Resist or Assist :      Yellow     Comment :          Done :    Yes  Yes  yes Yes                    Exercise 9  Exercise 10  Exercise 11  Exercise 12    Exercise :          Repetition/Time :          Resist or Assist :          Comment :          Done :    yes                     Patient Education and Home Exercises     Home Exercises Provided and Patient Education Provided     Education provided:   - HEP     Written Home Exercises Provided: yes. Exercises were reviewed and Gayle was able to demonstrate them prior to the end of the session.  Gayle demonstrated good  understanding of the education provided. See EMR under Patient Instructions for exercises provided during therapy sessions    ASSESSMENT     Patient completed all exercises with good effort. Gayle has some mild discomfort at end ranges when performing AROM. Verbal cues needed to avoid R shoulder hiking.     Gayle Is progressing well towards her goals.   Pt prognosis is Excellent.     Pt will continue to benefit from skilled outpatient physical therapy to address the deficits listed in the problem list box on initial evaluation, provide pt/family education and to maximize pt's level of independence in the home and community environment.     Pt's spiritual, cultural and educational needs considered and pt agreeable to plan of care and goals.     Anticipated barriers to physical therapy: none     Goals:  Short Term Goals (3 Weeks):   1. Patient will be compliant with home exercise program to assist therapy in restoring pain free motion of the shoulder. - MET  2. Patient will improve impaired R elbow/wrist/hand  manual muscle tests 1/2 grade to improve strength for functional tasks.- MET  3. Patient will improve R PROM shoulder flexion >/= 20 degrees to improve functional mobility of upper extremities.- MET  4. Patient will improve R PROM shoulder abduction >/= 20 degrees to  improve functional mobility of upper extremities. -MET     Long Term Goals (6 Weeks):   1. Patient will improve FOTO score to </= 63% to demonstrate improvements in R UE carrying, moving, and handling objects  2. Patient will improve impaired shoulder manual muscle tests grade to 4/5 bilaterally to improve strength for household duties.  3. Patient will improve R PROM shoulder flexion to >/= 140 degrees to improve functional mobility of upper extremities.   4. Patient will improve R PROM shoulder abduction to >/= 140 degrees to improve functional mobility of upper extremities.  5. Patient will report pain level </= 2/10 following completion of a functional activity.      PLAN     Outpatient Physical Therapy 3 times weekly for 6 weeks to include the following interventions: Manual Therapy, Neuromuscular Re-ed, Patient Education, Therapeutic Activities, and Therapeutic Exercise.     Gabino Jc, PTA

## 2022-12-13 ENCOUNTER — ANTI-COAG VISIT (OUTPATIENT)
Dept: CARDIOLOGY | Facility: HOSPITAL | Age: 62
End: 2022-12-13
Payer: MEDICAID

## 2022-12-13 ENCOUNTER — CLINICAL SUPPORT (OUTPATIENT)
Dept: REHABILITATION | Facility: HOSPITAL | Age: 62
End: 2022-12-13
Payer: MEDICAID

## 2022-12-13 DIAGNOSIS — M25.511 RIGHT SHOULDER PAIN, UNSPECIFIED CHRONICITY: Primary | ICD-10-CM

## 2022-12-13 DIAGNOSIS — R29.898 IMPAIRED STRENGTH OF SHOULDER MUSCLES: ICD-10-CM

## 2022-12-13 DIAGNOSIS — Z86.711 PERSONAL HISTORY OF PE (PULMONARY EMBOLISM): ICD-10-CM

## 2022-12-13 DIAGNOSIS — Z86.718 PERSONAL HISTORY OF DVT (DEEP VEIN THROMBOSIS): ICD-10-CM

## 2022-12-13 DIAGNOSIS — M25.611 DECREASED ROM OF RIGHT SHOULDER: ICD-10-CM

## 2022-12-13 LAB — INR PPP: 2.7 (ref 2–3)

## 2022-12-13 PROCEDURE — 97110 THERAPEUTIC EXERCISES: CPT

## 2022-12-13 PROCEDURE — 99211 OFF/OP EST MAY X REQ PHY/QHP: CPT

## 2022-12-13 PROCEDURE — 85610 PROTHROMBIN TIME: CPT

## 2022-12-13 NOTE — PROGRESS NOTES
[facility]   Physical Therapy Treatment Note     Name: Gayle Gold  Clinic Number: [id number]    Therapy Diagnosis:   Encounter Diagnoses   Name Primary?    Right shoulder pain, unspecified chronicity Yes    Decreased ROM of right shoulder     Impaired strength of shoulder muscles          Physician: Tammy Pillai PA*    Visit Date: 12/13/2022      Physician Orders: PT eval and treat  Medical Diagnosis from Referral:  S46.011D Traumatic complete tear of R rotator cuff / Z98.890 S/P R rotator cuff repair    Evaluation Date:11/3/2022  Authorization Period Expiration: 12/22/2022  Reassessment / POC completed: 12/01/2022  Visit # / Visits authorized: 16 / 18  FOTO Score: 56    PTA Visit: 1/5    Time In: 08:05  Time Out: 08:35  Total Billable Time: 30 minutes    SUBJECTIVE     Pt reports: no problems since last session. Continuing to work at home.   She was compliant with home exercise program.  Response to previous treatment: good   Functional change:  improve ROM     Pain: 0/10 taking  Tylenol for pain  Location: right shoulder area     OBJECTIVE     Objective Measures updated at progress report unless specified.     Treatment     Gayle received the treatments listed below:      therapeutic exercises to develop strength, ROM, flexibility, and posture for 35 minutes including:  Therapeutic Exercise   Therapeutic Exercise Grid     Exercise 1  Exercise 2  Exercise 3  Exercise 4    Exercise :    PROM: Fwd Elevate <120   ER< 30 scap plane  AAROM Dowel:   Seated shoulder elevation Seated Incline Table slides :   Flex/Scap/ER        Ball Roll on Wall     Repetition/Time :    X 10 X 15 x10 x15   Resist or Assist :          Comment :              Done :    Yes   Yes  Yes  Yes                   Exercise 5  Exercise 6  Exercise 7  Exercise 8    Exercise :    AROM:  Supine flex  Supine punch  Wall slides   Theraband:   Shoulder ext  Seated rows  Lawnmower  Serratus punch Elbow Theraband :    Biceps curl   Triceps press Scapular retraction    Repetition/Time :    X 10 X 10 10 20 x 3 sec   Resist or Assist :      Yellow     Comment :          Done :    Yes  Yes  yes No                     Exercise 9  Exercise 10  Exercise 11  Exercise 12    Exercise :          Repetition/Time :          Resist or Assist :          Comment :          Done :                         Patient Education and Home Exercises     Home Exercises Provided and Patient Education Provided     Education provided:   - HEP     Written Home Exercises Provided: yes. Exercises were reviewed and Gayle was able to demonstrate them prior to the end of the session.  Gayle demonstrated good  understanding of the education provided. See EMR under Patient Instructions for exercises provided during therapy sessions    ASSESSMENT     Gayle tolerated treatment well today as demonstrated by improved PROM shoulder elevation and ER. Patient requires mod VC to avoid shoulder hiking during AROM of R shoulder. Progressing well with advanced exercises.     Gayle Is progressing well towards her goals.   Pt prognosis is Excellent.     Pt will continue to benefit from skilled outpatient physical therapy to address the deficits listed in the problem list box on initial evaluation, provide pt/family education and to maximize pt's level of independence in the home and community environment.     Pt's spiritual, cultural and educational needs considered and pt agreeable to plan of care and goals.     Anticipated barriers to physical therapy: none     Goals:  Short Term Goals (3 Weeks):   1. Patient will be compliant with home exercise program to assist therapy in restoring pain free motion of the shoulder. - MET  2. Patient will improve impaired R elbow/wrist/hand  manual muscle tests 1/2 grade to improve strength for functional tasks.- MET  3. Patient will improve R PROM shoulder flexion >/= 20 degrees to improve functional mobility of upper extremities.-  MET  4. Patient will improve R PROM shoulder abduction >/= 20 degrees to improve functional mobility of upper extremities. -MET     Long Term Goals (6 Weeks):   1. Patient will improve FOTO score to </= 63% to demonstrate improvements in R UE carrying, moving, and handling objects  2. Patient will improve impaired shoulder manual muscle tests grade to 4/5 bilaterally to improve strength for household duties.  3. Patient will improve R PROM shoulder flexion to >/= 140 degrees to improve functional mobility of upper extremities.   4. Patient will improve R PROM shoulder abduction to >/= 140 degrees to improve functional mobility of upper extremities.  5. Patient will report pain level </= 2/10 following completion of a functional activity.      PLAN     Outpatient Physical Therapy 3 times weekly for 6 weeks to include the following interventions: Manual Therapy, Neuromuscular Re-ed, Patient Education, Therapeutic Activities, and Therapeutic Exercise.     Ruslan Mcnamara, PT

## 2022-12-14 ENCOUNTER — CLINICAL SUPPORT (OUTPATIENT)
Dept: REHABILITATION | Facility: HOSPITAL | Age: 62
End: 2022-12-14
Payer: MEDICAID

## 2022-12-14 DIAGNOSIS — S46.011D TRAUMATIC COMPLETE TEAR OF RIGHT ROTATOR CUFF, SUBSEQUENT ENCOUNTER: Primary | ICD-10-CM

## 2022-12-14 DIAGNOSIS — M25.511 RIGHT SHOULDER PAIN, UNSPECIFIED CHRONICITY: Primary | ICD-10-CM

## 2022-12-14 DIAGNOSIS — R29.898 IMPAIRED STRENGTH OF SHOULDER MUSCLES: ICD-10-CM

## 2022-12-14 DIAGNOSIS — M25.611 DECREASED ROM OF RIGHT SHOULDER: ICD-10-CM

## 2022-12-14 DIAGNOSIS — Z98.890 STATUS POST RIGHT ROTATOR CUFF REPAIR: ICD-10-CM

## 2022-12-14 PROCEDURE — 97110 THERAPEUTIC EXERCISES: CPT | Mod: CQ

## 2022-12-14 NOTE — PROGRESS NOTES
[facility]   Physical Therapy Treatment Note     Name: Gayle Gold  Clinic Number: [id number]    Therapy Diagnosis:   Encounter Diagnoses   Name Primary?    Right shoulder pain, unspecified chronicity Yes    Decreased ROM of right shoulder     Impaired strength of shoulder muscles          Physician: Tammy Pillai PA*    Visit Date: 12/14/2022      Physician Orders: PT eval and treat  Medical Diagnosis from Referral:  S46.011D Traumatic complete tear of R rotator cuff / Z98.890 S/P R rotator cuff repair    Evaluation Date:11/3/2022  Authorization Period Expiration: 12/22/2022  Reassessment / POC completed: 12/01/2022  Visit # / Visits authorized: 17 / 18      PTA Visit: 1/5    Time In: 0800  Time Out: 0830  Total Billable Time: 30 minutes    SUBJECTIVE     Pt reports: no problems since last session. Continuing to work at home.   She was compliant with home exercise program.  Response to previous treatment: good   Functional change:  improve ROM     Pain: 0/10 taking  Tylenol for pain  Location: right shoulder area     OBJECTIVE     Objective Measures updated at progress report unless specified.     Treatment     Gayle received the treatments listed below:      therapeutic exercises to develop strength, ROM, flexibility, and posture for 30 minutes including:  Therapeutic Exercise   Therapeutic Exercise Grid     Exercise 1  Exercise 2  Exercise 3  Exercise 4    Exercise :    PROM: Fwd Elevate <120   ER< 30 scap plane  AAROM Dowel:   Seated shoulder elevation Seated Incline Table slides :   Flex/Scap/ER        Ball Roll on Wall     Repetition/Time :    X 10 X 15 x10 x15   Resist or Assist :          Comment :              Done :    Yes   Yes  Yes  Yes                   Exercise 5  Exercise 6  Exercise 7  Exercise 8    Exercise :    AROM:  Supine flex  Supine punch  Wall slides   Theraband:   Shoulder ext  Seated rows  Lawnmower  Serratus punch Elbow Theraband :   Biceps curl    Triceps press Scapular retraction    Repetition/Time :    X 10 X 10 10 20 x 3 sec   Resist or Assist :      Yellow     Comment :          Done :    Yes  Yes  yes No                     Exercise 9  Exercise 10  Exercise 11  Exercise 12    Exercise :          Repetition/Time :          Resist or Assist :          Comment :          Done :                         Patient Education and Home Exercises     Home Exercises Provided and Patient Education Provided     Education provided:   - HEP     Written Home Exercises Provided: yes. Exercises were reviewed and Gayle was able to demonstrate them prior to the end of the session.  Gayle demonstrated good  understanding of the education provided. See EMR under Patient Instructions for exercises provided during therapy sessions    ASSESSMENT     Gayle completed all exercises with good effort. Patient requires mod VC to avoid shoulder hiking during AROM of R shoulder. Progressing well with advanced exercises.     Gayle Is progressing well towards her goals.   Pt prognosis is Excellent.     Pt will continue to benefit from skilled outpatient physical therapy to address the deficits listed in the problem list box on initial evaluation, provide pt/family education and to maximize pt's level of independence in the home and community environment.     Pt's spiritual, cultural and educational needs considered and pt agreeable to plan of care and goals.     Anticipated barriers to physical therapy: none     Goals:  Short Term Goals (3 Weeks):   1. Patient will be compliant with home exercise program to assist therapy in restoring pain free motion of the shoulder. - MET  2. Patient will improve impaired R elbow/wrist/hand  manual muscle tests 1/2 grade to improve strength for functional tasks.- MET  3. Patient will improve R PROM shoulder flexion >/= 20 degrees to improve functional mobility of upper extremities.- MET  4. Patient will improve R PROM shoulder abduction >/= 20  degrees to improve functional mobility of upper extremities. -MET     Long Term Goals (6 Weeks):   1. Patient will improve FOTO score to </= 63% to demonstrate improvements in R UE carrying, moving, and handling objects  2. Patient will improve impaired shoulder manual muscle tests grade to 4/5 bilaterally to improve strength for household duties.  3. Patient will improve R PROM shoulder flexion to >/= 140 degrees to improve functional mobility of upper extremities.   4. Patient will improve R PROM shoulder abduction to >/= 140 degrees to improve functional mobility of upper extremities.  5. Patient will report pain level </= 2/10 following completion of a functional activity.      PLAN     Outpatient Physical Therapy 3 times weekly for 6 weeks to include the following interventions: Manual Therapy, Neuromuscular Re-ed, Patient Education, Therapeutic Activities, and Therapeutic Exercise.     Gabino Jc, PTA

## 2022-12-15 ENCOUNTER — CLINICAL SUPPORT (OUTPATIENT)
Dept: REHABILITATION | Facility: HOSPITAL | Age: 62
End: 2022-12-15
Payer: MEDICAID

## 2022-12-15 DIAGNOSIS — R29.898 IMPAIRED STRENGTH OF SHOULDER MUSCLES: ICD-10-CM

## 2022-12-15 DIAGNOSIS — M25.611 DECREASED ROM OF RIGHT SHOULDER: ICD-10-CM

## 2022-12-15 DIAGNOSIS — M25.511 RIGHT SHOULDER PAIN, UNSPECIFIED CHRONICITY: Primary | ICD-10-CM

## 2022-12-15 PROCEDURE — 97110 THERAPEUTIC EXERCISES: CPT

## 2022-12-15 NOTE — PROGRESS NOTES
[facility]   Physical Therapy Treatment Note     Name: Gayle Gold  Clinic Number: [id number]    Therapy Diagnosis:   Encounter Diagnoses   Name Primary?    Right shoulder pain, unspecified chronicity Yes    Decreased ROM of right shoulder     Impaired strength of shoulder muscles          Physician: Tammy Pillai PA*    Visit Date: 12/15/2022      Physician Orders: PT eval and treat  Medical Diagnosis from Referral:  S46.011D Traumatic complete tear of R rotator cuff / Z98.890 S/P R rotator cuff repair    Evaluation Date:11/3/2022  Authorization Period Expiration: 12/22/2022  Reassessment / POC completed: 12/01/2022  Visit # / Visits authorized: 18 / 18      PTA Visit: 1/5    Time In: 0800  Time Out: 0835  Total Billable Time: 35 minutes    SUBJECTIVE     Pt reports: Right shoulder is a little stiff this morning due to the cold weather.   She was compliant with home exercise program.  Response to previous treatment: good   Functional change:  improve ROM     Pain: 0/10 taking   Location: right shoulder area     OBJECTIVE     Objective Measures updated at progress report unless specified.     Treatment     Gayle received the treatments listed below:      Soft tissue massage to R periscapular area and upper traps.     therapeutic exercises to develop strength, ROM, flexibility, and posture for 35 minutes including:  Therapeutic Exercise   Therapeutic Exercise Grid     Exercise 1  Exercise 2  Exercise 3  Exercise 4    Exercise :    PROM: Fwd Elevate <120   ER< 30 scap plane  AAROM Dowel:   Seated shoulder elevation Seated Incline Table slides :   Flex/Scap/ER        Ball Roll on Wall     Repetition/Time :    X 10 X 15 x10 x15   Resist or Assist :          Comment :              Done :    Yes   Yes  Yes  Yes                   Exercise 5  Exercise 6  Exercise 7  Exercise 8    Exercise :    AROM:  Supine flex  Supine punch  Wall slides   Theraband:   Shoulder ext  Seated  rows  Lawnmower  Serratus punch Elbow Theraband :   Biceps curl   Triceps press Scapular retraction    Repetition/Time :    X 10 X 10 10 20 x 3 sec   Resist or Assist :      Yellow     Comment :          Done :    Yes  Yes  yes Ye s                   Exercise 9  Exercise 10  Exercise 11  Exercise 12    Exercise :          Repetition/Time :          Resist or Assist :          Comment :          Done :                         Patient Education and Home Exercises     Home Exercises Provided and Patient Education Provided     Education provided:   - HEP     Written Home Exercises Provided: yes. Exercises were reviewed and Gayle was able to demonstrate them prior to the end of the session.  Gayle demonstrated good  understanding of the education provided. See EMR under Patient Instructions for exercises provided during therapy sessions    ASSESSMENT     Gayle tolerated treatment well with min VC reminders to prevent hiking of R shoulder during exercise. Initiated progressive increase in active and active assistive exercises by starting reps in comfortable range and increasing range each 5 reps.      Gayle Is progressing well towards her goals.   Pt prognosis is Excellent.     Pt will continue to benefit from skilled outpatient physical therapy to address the deficits listed in the problem list box on initial evaluation, provide pt/family education and to maximize pt's level of independence in the home and community environment.     Pt's spiritual, cultural and educational needs considered and pt agreeable to plan of care and goals.     Anticipated barriers to physical therapy: none     Goals:  Short Term Goals (3 Weeks):   1. Patient will be compliant with home exercise program to assist therapy in restoring pain free motion of the shoulder. - MET  2. Patient will improve impaired R elbow/wrist/hand  manual muscle tests 1/2 grade to improve strength for functional tasks.- MET  3. Patient will improve R PROM shoulder  flexion >/= 20 degrees to improve functional mobility of upper extremities.- MET  4. Patient will improve R PROM shoulder abduction >/= 20 degrees to improve functional mobility of upper extremities. -MET     Long Term Goals (6 Weeks):   1. Patient will improve FOTO score to </= 63% to demonstrate improvements in R UE carrying, moving, and handling objects  2. Patient will improve impaired shoulder manual muscle tests grade to 4/5 bilaterally to improve strength for household duties.  3. Patient will improve R PROM shoulder flexion to >/= 140 degrees to improve functional mobility of upper extremities.   4. Patient will improve R PROM shoulder abduction to >/= 140 degrees to improve functional mobility of upper extremities.  5. Patient will report pain level </= 2/10 following completion of a functional activity.      PLAN     Outpatient Physical Therapy 3 times weekly for 6 weeks to include the following interventions: Manual Therapy, Neuromuscular Re-ed, Patient Education, Therapeutic Activities, and Therapeutic Exercise. Received authorization to continue therapy x 4 weeks.     Ruslan Mcnamara, PT

## 2022-12-20 ENCOUNTER — CLINICAL SUPPORT (OUTPATIENT)
Dept: REHABILITATION | Facility: HOSPITAL | Age: 62
End: 2022-12-20
Payer: MEDICARE

## 2022-12-20 DIAGNOSIS — M25.511 RIGHT SHOULDER PAIN, UNSPECIFIED CHRONICITY: Primary | ICD-10-CM

## 2022-12-20 DIAGNOSIS — M25.611 DECREASED ROM OF RIGHT SHOULDER: ICD-10-CM

## 2022-12-20 DIAGNOSIS — R29.898 IMPAIRED STRENGTH OF SHOULDER MUSCLES: ICD-10-CM

## 2022-12-20 PROCEDURE — 97110 THERAPEUTIC EXERCISES: CPT | Mod: CQ

## 2022-12-20 NOTE — PROGRESS NOTES
[facility]   Physical Therapy Treatment Note     Name: Gayle Gold  Clinic Number: [id number]    Therapy Diagnosis:   No diagnosis found.        Physician: Tammy Pillai PA*    Visit Date: 12/20/2022      Physician Orders: PT eval and treat  Medical Diagnosis from Referral:  S46.011D Traumatic complete tear of R rotator cuff / Z98.890 S/P R rotator cuff repair    Evaluation Date:11/3/2022  Authorization Period Expiration: 1/13/2023  Reassessment / POC Due: 1/3/2023  Visit # / Visits authorized: 1 / 12    PTA Visit: 1/5    Time In: 1500  Time Out: 1530  Total Billable Time: 30 minutes    SUBJECTIVE     Pt reports: Right shoulder is a little stiff this morning due to the cold weather, but is not painful.  She was compliant with home exercise program.  Response to previous treatment: good   Functional change:  improve ROM     Pain: 0/10 taking   Location: right shoulder area     OBJECTIVE     Objective Measures updated at progress report unless specified.     Treatment     Gayle received the treatments listed below:      Soft tissue massage to R periscapular area and upper traps.     therapeutic exercises to develop strength, ROM, flexibility, and posture for 30 minutes including:  Therapeutic Exercise   Therapeutic Exercise Grid     Exercise 1  Exercise 2  Exercise 3  Exercise 4    Exercise :    PROM: Fwd Elevate <120   ER< 30 scap plane  AAROM Dowel:   Seated shoulder elevation Seated Incline Table slides :   Flex/Scap/ER        Ball Roll on Wall     Repetition/Time :    X 10 X 15 x10 x15   Resist or Assist :          Comment :              Done :    Yes   Yes  Yes  Yes                   Exercise 5  Exercise 6  Exercise 7  Exercise 8    Exercise :    AROM:  Supine flex  Supine punch  Wall slides   Theraband:   Shoulder ext  Seated rows  Lawnmower  Serratus punch Elbow Theraband :   Biceps curl   Triceps press Scapular retraction    Repetition/Time :    X 10 X 10 10 20 x 3 sec    Resist or Assist :      Yellow     Comment :          Done :    Yes  Yes  yes Ye s                   Exercise 9  Exercise 10  Exercise 11  Exercise 12    Exercise :          Repetition/Time :          Resist or Assist :          Comment :          Done :                         Patient Education and Home Exercises     Home Exercises Provided and Patient Education Provided     Education provided:   - HEP     Written Home Exercises Provided: yes. Exercises were reviewed and Gayle was able to demonstrate them prior to the end of the session.  Gayle demonstrated good  understanding of the education provided. See EMR under Patient Instructions for exercises provided during therapy sessions    ASSESSMENT     Gayle tolerated treatment well with min VC reminders to prevent hiking of R shoulder during exercise. Continued with increases made last session with good tolerance.      Gayle Is progressing well towards her goals.   Pt prognosis is Excellent.     Pt will continue to benefit from skilled outpatient physical therapy to address the deficits listed in the problem list box on initial evaluation, provide pt/family education and to maximize pt's level of independence in the home and community environment.     Pt's spiritual, cultural and educational needs considered and pt agreeable to plan of care and goals.     Anticipated barriers to physical therapy: none     Goals:  Short Term Goals (3 Weeks):   1. Patient will be compliant with home exercise program to assist therapy in restoring pain free motion of the shoulder. - MET  2. Patient will improve impaired R elbow/wrist/hand  manual muscle tests 1/2 grade to improve strength for functional tasks.- MET  3. Patient will improve R PROM shoulder flexion >/= 20 degrees to improve functional mobility of upper extremities.- MET  4. Patient will improve R PROM shoulder abduction >/= 20 degrees to improve functional mobility of upper extremities. -MET     Long Term Goals (6  Weeks):   1. Patient will improve FOTO score to </= 63% to demonstrate improvements in R UE carrying, moving, and handling objects  2. Patient will improve impaired shoulder manual muscle tests grade to 4/5 bilaterally to improve strength for household duties.  3. Patient will improve R PROM shoulder flexion to >/= 140 degrees to improve functional mobility of upper extremities.   4. Patient will improve R PROM shoulder abduction to >/= 140 degrees to improve functional mobility of upper extremities.  5. Patient will report pain level </= 2/10 following completion of a functional activity.      PLAN     Outpatient Physical Therapy 3 times weekly for 6 weeks to include the following interventions: Manual Therapy, Neuromuscular Re-ed, Patient Education, Therapeutic Activities, and Therapeutic Exercise. Received authorization to continue therapy x 4 weeks.     Gabino Jc, PTA

## 2022-12-21 ENCOUNTER — CLINICAL SUPPORT (OUTPATIENT)
Dept: REHABILITATION | Facility: HOSPITAL | Age: 62
End: 2022-12-21
Payer: MEDICARE

## 2022-12-21 DIAGNOSIS — R29.898 IMPAIRED STRENGTH OF SHOULDER MUSCLES: ICD-10-CM

## 2022-12-21 DIAGNOSIS — M25.611 DECREASED ROM OF RIGHT SHOULDER: ICD-10-CM

## 2022-12-21 DIAGNOSIS — M25.511 RIGHT SHOULDER PAIN, UNSPECIFIED CHRONICITY: Primary | ICD-10-CM

## 2022-12-21 PROCEDURE — 97110 THERAPEUTIC EXERCISES: CPT | Mod: CQ

## 2022-12-21 NOTE — PROGRESS NOTES
[facility]   Physical Therapy Treatment Note     Name: Gayle Gold  Clinic Number: [id number]    Therapy Diagnosis:   Encounter Diagnoses   Name Primary?    Right shoulder pain, unspecified chronicity Yes    Decreased ROM of right shoulder     Impaired strength of shoulder muscles            Physician: Tammy Pillai PA*    Visit Date: 12/21/2022      Physician Orders: PT eval and treat  Medical Diagnosis from Referral:  S46.011D Traumatic complete tear of R rotator cuff / Z98.890 S/P R rotator cuff repair    Evaluation Date:11/3/2022  Authorization Period Expiration: 1/13/2023  Reassessment / POC Due: 1/3/2023  Visit # / Visits authorized: 2 / 12    PTA Visit: 2/5    Time In: 0800  Time Out: 0830  Total Billable Time: 30 minutes    SUBJECTIVE     Pt reports: Right shoulder is a little stiff this morning due to the cold weather, but is not painful.  She was compliant with home exercise program.  Response to previous treatment: good   Functional change:  improve ROM     Pain: 0/10 taking   Location: right shoulder area     OBJECTIVE     Objective Measures updated at progress report unless specified.     Treatment     Gayle received the treatments listed below:      Soft tissue massage to R periscapular area and upper traps.     therapeutic exercises to develop strength, ROM, flexibility, and posture for 30 minutes including:  Therapeutic Exercise   Therapeutic Exercise Grid     Exercise 1  Exercise 2  Exercise 3  Exercise 4    Exercise :    PROM: Fwd Elevate <120   ER< 30 scap plane  AAROM Dowel:   Seated shoulder elevation Seated Incline Table slides :   Flex/Scap/ER        Ball Roll on Wall     Repetition/Time :    X 10 X 15 x10 x15   Resist or Assist :          Comment :              Done :    Yes   Yes  Yes  Yes                   Exercise 5  Exercise 6  Exercise 7  Exercise 8    Exercise :    AROM:  Supine flex  Supine punch  Wall slides   Theraband:   Shoulder ext  Seated  rows  Lawnmower  Serratus punch Elbow Theraband :   Biceps curl   Triceps press Scapular retraction    Repetition/Time :    X 10 X 10 10 20 x 3 sec   Resist or Assist :      Yellow     Comment :          Done :    Yes  Yes  yes Ye s                   Exercise 9  Exercise 10  Exercise 11  Exercise 12    Exercise :          Repetition/Time :          Resist or Assist :          Comment :          Done :                         Patient Education and Home Exercises     Home Exercises Provided and Patient Education Provided     Education provided:   - HEP     Written Home Exercises Provided: yes. Exercises were reviewed and Gayle was able to demonstrate them prior to the end of the session.  Gayle demonstrated good  understanding of the education provided. See EMR under Patient Instructions for exercises provided during therapy sessions    ASSESSMENT     Gayle tolerated treatment well with min VC reminders to prevent hiking of R shoulder during exercise.Increased thera-band exercises to red with good tolerance.      Gayle Is progressing well towards her goals.   Pt prognosis is Excellent.     Pt will continue to benefit from skilled outpatient physical therapy to address the deficits listed in the problem list box on initial evaluation, provide pt/family education and to maximize pt's level of independence in the home and community environment.     Pt's spiritual, cultural and educational needs considered and pt agreeable to plan of care and goals.     Anticipated barriers to physical therapy: none     Goals:  Short Term Goals (3 Weeks):   1. Patient will be compliant with home exercise program to assist therapy in restoring pain free motion of the shoulder. - MET  2. Patient will improve impaired R elbow/wrist/hand  manual muscle tests 1/2 grade to improve strength for functional tasks.- MET  3. Patient will improve R PROM shoulder flexion >/= 20 degrees to improve functional mobility of upper extremities.- MET  4.  Patient will improve R PROM shoulder abduction >/= 20 degrees to improve functional mobility of upper extremities. -MET     Long Term Goals (6 Weeks):   1. Patient will improve FOTO score to </= 63% to demonstrate improvements in R UE carrying, moving, and handling objects  2. Patient will improve impaired shoulder manual muscle tests grade to 4/5 bilaterally to improve strength for household duties.  3. Patient will improve R PROM shoulder flexion to >/= 140 degrees to improve functional mobility of upper extremities.   4. Patient will improve R PROM shoulder abduction to >/= 140 degrees to improve functional mobility of upper extremities.  5. Patient will report pain level </= 2/10 following completion of a functional activity.      PLAN     Outpatient Physical Therapy 3 times weekly for 6 weeks to include the following interventions: Manual Therapy, Neuromuscular Re-ed, Patient Education, Therapeutic Activities, and Therapeutic Exercise. Received authorization to continue therapy x 4 weeks.     Gabino Jc, PTA

## 2022-12-22 ENCOUNTER — CLINICAL SUPPORT (OUTPATIENT)
Dept: REHABILITATION | Facility: HOSPITAL | Age: 62
End: 2022-12-22
Payer: MEDICARE

## 2022-12-22 DIAGNOSIS — M25.511 RIGHT SHOULDER PAIN, UNSPECIFIED CHRONICITY: Primary | ICD-10-CM

## 2022-12-22 DIAGNOSIS — R29.898 IMPAIRED STRENGTH OF SHOULDER MUSCLES: ICD-10-CM

## 2022-12-22 DIAGNOSIS — M25.611 DECREASED ROM OF RIGHT SHOULDER: ICD-10-CM

## 2022-12-22 PROCEDURE — 97110 THERAPEUTIC EXERCISES: CPT | Mod: CQ

## 2022-12-22 NOTE — PROGRESS NOTES
[facility]   Physical Therapy Treatment Note     Name: Gayle Gold  Clinic Number: [id number]    Therapy Diagnosis:   Encounter Diagnoses   Name Primary?    Right shoulder pain, unspecified chronicity Yes    Decreased ROM of right shoulder     Impaired strength of shoulder muscles          Physician: Tammy Pillai PA*    Visit Date: 12/22/2022      Physician Orders: PT eval and treat  Medical Diagnosis from Referral:  S46.011D Traumatic complete tear of R rotator cuff / Z98.890 S/P R rotator cuff repair    Evaluation Date:11/3/2022  Authorization Period Expiration: 1/13/2023  Reassessment / POC Due: 1/3/2023  Visit # / Visits authorized: 2 / 12    PTA Visit: 2/5    Time In: 0900  Time Out: 0930  Total Billable Time: 30 minutes    SUBJECTIVE     Pt reports: Right shoulder is a little stiff this morning due to the cold weather, but is not painful.  She was compliant with home exercise program.  Response to previous treatment: good   Functional change:  improve ROM     Pain: 0/10 taking   Location: right shoulder area     OBJECTIVE     Objective Measures updated at progress report unless specified.     Treatment     Gayle received the treatments listed below:      Soft tissue massage to R periscapular area and upper traps.     therapeutic exercises to develop strength, ROM, flexibility, and posture for 30 minutes including:  Therapeutic Exercise   Therapeutic Exercise Grid     Exercise 1  Exercise 2  Exercise 3  Exercise 4    Exercise :    PROM: Fwd Elevate <120   ER< 30 scap plane  AAROM Dowel:   Seated shoulder elevation Seated Incline Table slides :   Flex/Scap/ER        Ball Roll on Wall     Repetition/Time :    X 10 X 20 x20 x20   Resist or Assist :          Comment :              Done :    Yes   Yes  Yes  Yes                   Exercise 5  Exercise 6  Exercise 7  Exercise 8    Exercise :    AROM:  Supine flex  Supine punch  Wall slides   Theraband:   Shoulder ext  Seated  rows  Lawnmower  Serratus punch Elbow Theraband :   Biceps curl   Triceps press Scapular retraction    Repetition/Time :    X 10 X 10 10 20 x 3 sec   Resist or Assist :      Yellow     Comment :          Done :    Yes  Yes  yes Ye s                   Exercise 9  Exercise 10  Exercise 11  Exercise 12    Exercise :          Repetition/Time :          Resist or Assist :          Comment :          Done :                         Patient Education and Home Exercises     Home Exercises Provided and Patient Education Provided     Education provided:   - HEP     Written Home Exercises Provided: yes. Exercises were reviewed and Gayle was able to demonstrate them prior to the end of the session.  Gayle demonstrated good  understanding of the education provided. See EMR under Patient Instructions for exercises provided during therapy sessions    ASSESSMENT     Gayle tolerated treatment well with min VC reminders to prevent hiking of R shoulder during exercise.Increased thera-band exercises to 20 with good tolerance.      Gayle Is progressing well towards her goals.   Pt prognosis is Excellent.     Pt will continue to benefit from skilled outpatient physical therapy to address the deficits listed in the problem list box on initial evaluation, provide pt/family education and to maximize pt's level of independence in the home and community environment.     Pt's spiritual, cultural and educational needs considered and pt agreeable to plan of care and goals.     Anticipated barriers to physical therapy: none     Goals:  Short Term Goals (3 Weeks):   1. Patient will be compliant with home exercise program to assist therapy in restoring pain free motion of the shoulder. - MET  2. Patient will improve impaired R elbow/wrist/hand  manual muscle tests 1/2 grade to improve strength for functional tasks.- MET  3. Patient will improve R PROM shoulder flexion >/= 20 degrees to improve functional mobility of upper extremities.- MET  4.  Patient will improve R PROM shoulder abduction >/= 20 degrees to improve functional mobility of upper extremities. -MET     Long Term Goals (6 Weeks):   1. Patient will improve FOTO score to </= 63% to demonstrate improvements in R UE carrying, moving, and handling objects  2. Patient will improve impaired shoulder manual muscle tests grade to 4/5 bilaterally to improve strength for household duties.  3. Patient will improve R PROM shoulder flexion to >/= 140 degrees to improve functional mobility of upper extremities.   4. Patient will improve R PROM shoulder abduction to >/= 140 degrees to improve functional mobility of upper extremities.  5. Patient will report pain level </= 2/10 following completion of a functional activity.      PLAN     Outpatient Physical Therapy 3 times weekly for 6 weeks to include the following interventions: Manual Therapy, Neuromuscular Re-ed, Patient Education, Therapeutic Activities, and Therapeutic Exercise. Received authorization to continue therapy x 4 weeks.     Gabino Jc, PTA

## 2022-12-27 ENCOUNTER — CLINICAL SUPPORT (OUTPATIENT)
Dept: REHABILITATION | Facility: HOSPITAL | Age: 62
End: 2022-12-27
Payer: MEDICARE

## 2022-12-27 DIAGNOSIS — R29.898 IMPAIRED STRENGTH OF SHOULDER MUSCLES: ICD-10-CM

## 2022-12-27 DIAGNOSIS — M25.511 RIGHT SHOULDER PAIN, UNSPECIFIED CHRONICITY: Primary | ICD-10-CM

## 2022-12-27 DIAGNOSIS — M25.611 DECREASED ROM OF RIGHT SHOULDER: ICD-10-CM

## 2022-12-27 PROCEDURE — 97110 THERAPEUTIC EXERCISES: CPT | Mod: CQ

## 2022-12-27 NOTE — PROGRESS NOTES
[facility]   Physical Therapy Treatment Note     Name: Gayle Gold  Clinic Number: [id number]    Therapy Diagnosis:   Encounter Diagnoses   Name Primary?    Right shoulder pain, unspecified chronicity Yes    Decreased ROM of right shoulder     Impaired strength of shoulder muscles        Physician: Tammy Pillai PA*    Visit Date: 12/27/2022      Physician Orders: PT eval and treat  Medical Diagnosis from Referral:  S46.011D Traumatic complete tear of R rotator cuff / Z98.890 S/P R rotator cuff repair    Evaluation Date:11/3/2022  Authorization Period Expiration: 1/13/2023  Reassessment / POC Due: 1/3/2023  Visit # / Visits authorized: 3 / 12    PTA Visit: 4/5    Time In: 0830  Time Out: 0900  Total Billable Time: 30 minutes    SUBJECTIVE     Pt reports: she had a good weekend and has no pain today.  She was compliant with home exercise program.  Response to previous treatment: good   Functional change:  improve ROM     Pain: 0/10 taking   Location: right shoulder area     OBJECTIVE     Objective Measures updated at progress report unless specified.     Treatment     Gayle received the treatments listed below:      Soft tissue massage to R periscapular area and upper traps.     therapeutic exercises to develop strength, ROM, flexibility, and posture for 30 minutes including:  Therapeutic Exercise   Therapeutic Exercise Grid     Exercise 1  Exercise 2  Exercise 3  Exercise 4    Exercise :    PROM: Fwd Elevate <120   ER< 30 scap plane  AAROM Dowel:   Seated shoulder elevation Seated Incline Table slides :   Flex/Scap/ER        Ball Roll on Wall     Repetition/Time :    X 10 X 20 x20 x20   Resist or Assist :          Comment :              Done :    Yes   Yes  Yes  Yes                   Exercise 5  Exercise 6  Exercise 7  Exercise 8    Exercise :    AROM:  Supine flex  Supine punch  Wall slides   Theraband:   Shoulder ext  Seated rows  Lawnmower  Serratus punch Elbow Theraband :    Biceps curl   Triceps press Scapular retraction    Repetition/Time :    X 10 X 10 10 20 x 3 sec   Resist or Assist :      Yellow     Comment :          Done :    Yes  Yes  yes Ye s                   Exercise 9  Exercise 10  Exercise 11  Exercise 12    Exercise :          Repetition/Time :          Resist or Assist :          Comment :          Done :                         Patient Education and Home Exercises     Home Exercises Provided and Patient Education Provided     Education provided:   - HEP     Written Home Exercises Provided: yes. Exercises were reviewed and Gayle was able to demonstrate them prior to the end of the session.  Gayle demonstrated good  understanding of the education provided. See EMR under Patient Instructions for exercises provided during therapy sessions    ASSESSMENT     Gayle tolerated treatment well with min VC reminders to prevent hiking of R shoulder during exercise. Continued with increases made last session with good tolerance.     Gayle Is progressing well towards her goals.   Pt prognosis is Excellent.     Pt will continue to benefit from skilled outpatient physical therapy to address the deficits listed in the problem list box on initial evaluation, provide pt/family education and to maximize pt's level of independence in the home and community environment.     Pt's spiritual, cultural and educational needs considered and pt agreeable to plan of care and goals.     Anticipated barriers to physical therapy: none     Goals:  Short Term Goals (3 Weeks):   1. Patient will be compliant with home exercise program to assist therapy in restoring pain free motion of the shoulder. - MET  2. Patient will improve impaired R elbow/wrist/hand  manual muscle tests 1/2 grade to improve strength for functional tasks.- MET  3. Patient will improve R PROM shoulder flexion >/= 20 degrees to improve functional mobility of upper extremities.- MET  4. Patient will improve R PROM shoulder  abduction >/= 20 degrees to improve functional mobility of upper extremities. -MET     Long Term Goals (6 Weeks):   1. Patient will improve FOTO score to </= 63% to demonstrate improvements in R UE carrying, moving, and handling objects  2. Patient will improve impaired shoulder manual muscle tests grade to 4/5 bilaterally to improve strength for household duties.  3. Patient will improve R PROM shoulder flexion to >/= 140 degrees to improve functional mobility of upper extremities.   4. Patient will improve R PROM shoulder abduction to >/= 140 degrees to improve functional mobility of upper extremities.  5. Patient will report pain level </= 2/10 following completion of a functional activity.      PLAN     Outpatient Physical Therapy 3 times weekly for 6 weeks to include the following interventions: Manual Therapy, Neuromuscular Re-ed, Patient Education, Therapeutic Activities, and Therapeutic Exercise. Received authorization to continue therapy x 4 weeks.     Gabino Jc, PTA

## 2022-12-28 ENCOUNTER — CLINICAL SUPPORT (OUTPATIENT)
Dept: REHABILITATION | Facility: HOSPITAL | Age: 62
End: 2022-12-28
Payer: MEDICARE

## 2022-12-28 DIAGNOSIS — R29.898 IMPAIRED STRENGTH OF SHOULDER MUSCLES: ICD-10-CM

## 2022-12-28 DIAGNOSIS — M25.611 DECREASED ROM OF RIGHT SHOULDER: ICD-10-CM

## 2022-12-28 DIAGNOSIS — M25.511 RIGHT SHOULDER PAIN, UNSPECIFIED CHRONICITY: Primary | ICD-10-CM

## 2022-12-28 PROCEDURE — 97110 THERAPEUTIC EXERCISES: CPT

## 2022-12-28 NOTE — PROGRESS NOTES
[facility]   Physical Therapy Treatment Note     Name: Gayle Gold  Clinic Number: [id number]    Therapy Diagnosis:   Encounter Diagnoses   Name Primary?    Right shoulder pain, unspecified chronicity Yes    Decreased ROM of right shoulder     Impaired strength of shoulder muscles        Physician: Tammy Pillai PA*    Visit Date: 12/28/2022      Physician Orders: PT eval and treat  Medical Diagnosis from Referral:  S46.011D Traumatic complete tear of R rotator cuff / Z98.890 S/P R rotator cuff repair    Evaluation Date:11/3/2022  Authorization Period Expiration: 1/13/2023  Reassessment / POC Due: 1/3/2023  Visit # / Visits authorized: 4 / 12    PTA Visit: 4/5    Time In: 0800  Time Out: 0830  Total Billable Time: 30 minutes    SUBJECTIVE     Pt reports: that she continues to see progress. Stiff first thing in the morning.   She was compliant with home exercise program.  Response to previous treatment: good   Functional change:  improve ROM     Pain: 0/10 taking   Location: right shoulder area     OBJECTIVE     Objective Measures updated at progress report unless specified.     Treatment     Gayle received the treatments listed below:      Soft tissue massage to R periscapular area and upper traps. Mobilized scapula on ribcage.      therapeutic exercises to develop strength, ROM, flexibility, and posture for 30 minutes including:  Therapeutic Exercise   Therapeutic Exercise Grid     Exercise 1  Exercise 2  Exercise 3  Exercise 4    Exercise :    PROM: Fwd Elevate <120   ER< 30 scap plane  AAROM Dowel:   Seated shoulder elevation Seated Incline Table slides :   Flex/Scap/ER        Ball Roll on Wall     Repetition/Time :    X 10 X 20 x20 x20   Resist or Assist :          Comment :              Done :    y y  y                  Exercise 5  Exercise 6  Exercise 7  Exercise 8    Exercise :    AROM:  Supine flex  Supine punch  Wall slides   Theraband:   Shoulder ext  Seated  rigoberto  Demetrio  Serratus punch Elbow Theraband :   Biceps curl   Triceps press Scapular retraction    Repetition/Time :    X 10 X 10 10 20 x 3 sec   Resist or Assist :      Yellow     Comment :     Rows and lawnmower onlyh     Done :     Y                     Exercise 9  Exercise 10  Exercise 11  Exercise 12    Exercise :    STM      Repetition/Time :          Resist or Assist :          Comment :    R Periscapular area      Done :    Y                      Patient Education and Home Exercises     Home Exercises Provided and Patient Education Provided     Education provided:   - HEP     Written Home Exercises Provided: yes. Exercises were reviewed and Gayle was able to demonstrate them prior to the end of the session.  Gayle demonstrated good  understanding of the education provided. See EMR under Patient Instructions for exercises provided during therapy sessions    ASSESSMENT     Gayle tolerated treatment well with min reports of discomfort at end range of shoulder elevation . Able to perform all other exercises without issues. Soft tissue work improved muscle guarding.     Gayle Is progressing well towards her goals.   Pt prognosis is Excellent.     Pt will continue to benefit from skilled outpatient physical therapy to address the deficits listed in the problem list box on initial evaluation, provide pt/family education and to maximize pt's level of independence in the home and community environment.     Pt's spiritual, cultural and educational needs considered and pt agreeable to plan of care and goals.     Anticipated barriers to physical therapy: none     Goals:  Short Term Goals (3 Weeks):   1. Patient will be compliant with home exercise program to assist therapy in restoring pain free motion of the shoulder. - MET  2. Patient will improve impaired R elbow/wrist/hand  manual muscle tests 1/2 grade to improve strength for functional tasks.- MET  3. Patient will improve R PROM shoulder flexion >/= 20  degrees to improve functional mobility of upper extremities.- MET  4. Patient will improve R PROM shoulder abduction >/= 20 degrees to improve functional mobility of upper extremities. -MET     Long Term Goals (6 Weeks):   1. Patient will improve FOTO score to </= 63% to demonstrate improvements in R UE carrying, moving, and handling objects  2. Patient will improve impaired shoulder manual muscle tests grade to 4/5 bilaterally to improve strength for household duties.  3. Patient will improve R PROM shoulder flexion to >/= 140 degrees to improve functional mobility of upper extremities.   4. Patient will improve R PROM shoulder abduction to >/= 140 degrees to improve functional mobility of upper extremities.  5. Patient will report pain level </= 2/10 following completion of a functional activity.      PLAN     Outpatient Physical Therapy 3 times weekly for 6 weeks to include the following interventions: Manual Therapy, Neuromuscular Re-ed, Patient Education, Therapeutic Activities, and Therapeutic Exercise. Received authorization to continue therapy x 4 weeks.     Ruslan Mcnamara, PT

## 2022-12-29 ENCOUNTER — CLINICAL SUPPORT (OUTPATIENT)
Dept: REHABILITATION | Facility: HOSPITAL | Age: 62
End: 2022-12-29
Payer: MEDICARE

## 2022-12-29 DIAGNOSIS — M25.611 DECREASED ROM OF RIGHT SHOULDER: ICD-10-CM

## 2022-12-29 DIAGNOSIS — R29.898 IMPAIRED STRENGTH OF SHOULDER MUSCLES: ICD-10-CM

## 2022-12-29 DIAGNOSIS — M25.511 RIGHT SHOULDER PAIN, UNSPECIFIED CHRONICITY: Primary | ICD-10-CM

## 2022-12-29 PROCEDURE — 97110 THERAPEUTIC EXERCISES: CPT | Mod: CQ

## 2022-12-29 NOTE — PROGRESS NOTES
[facility]   Physical Therapy Treatment Note     Name: Gayle Gold  Clinic Number: [id number]    Therapy Diagnosis:   Encounter Diagnoses   Name Primary?    Right shoulder pain, unspecified chronicity Yes    Decreased ROM of right shoulder     Impaired strength of shoulder muscles          Physician: Tammy Pillai PA*    Visit Date: 12/29/2022      Physician Orders: PT eval and treat  Medical Diagnosis from Referral:  S46.011D Traumatic complete tear of R rotator cuff / Z98.890 S/P R rotator cuff repair    Evaluation Date:11/3/2022  Authorization Period Expiration: 1/13/2023  Reassessment / POC Due: 1/3/2023  Visit # / Visits authorized: 5/ 12    PTA Visit: 1/5    Time In: 0900  Time Out: 0926  Total Billable Time: 26 minutes    SUBJECTIVE     Pt reports: that she feels good today, continues with stiffness.   She was compliant with home exercise program.  Response to previous treatment: good   Functional change:  improve ROM     Pain: 0/10 taking   Location: right shoulder area     OBJECTIVE     Objective Measures updated at progress report unless specified.     Treatment     Gayle received the treatments listed below:      Soft tissue massage to R periscapular area and upper traps. Mobilized scapula on ribcage.      therapeutic exercises to develop strength, ROM, flexibility, and posture for 26 minutes including:  Therapeutic Exercise   Therapeutic Exercise Grid     Exercise 1  Exercise 2  Exercise 3  Exercise 4    Exercise :    PROM: Fwd Elevate <120   ER< 30 scap plane  AAROM Dowel:   Seated shoulder elevation Seated Incline Table slides :   Flex/Scap/ER        Ball Roll on Wall     Repetition/Time :    X 10 X 20 x20 x20   Resist or Assist :          Comment :              Done :    y y y y                  Exercise 5  Exercise 6  Exercise 7  Exercise 8    Exercise :    AROM:  Supine flex  Supine punch  Wall slides   Theraband:   Shoulder ext  Seated  rigoberto  Demetrio  Serratus punch Elbow Theraband :   Biceps curl   Triceps press Scapular retraction    Repetition/Time :    X 10 X 10 10 20 x 3 sec   Resist or Assist :      Yellow     Comment :     Rows and lawnmower onlyh     Done :     Y                     Exercise 9  Exercise 10  Exercise 11  Exercise 12    Exercise :    STM      Repetition/Time :          Resist or Assist :          Comment :    R Periscapular area      Done :    Y                      Patient Education and Home Exercises     Home Exercises Provided and Patient Education Provided     Education provided:   - HEP     Written Home Exercises Provided: yes. Exercises were reviewed and Gayle was able to demonstrate them prior to the end of the session.  Gayle demonstrated good  understanding of the education provided. See EMR under Patient Instructions for exercises provided during therapy sessions    ASSESSMENT     Gayle tolerated treatment well with min reports of discomfort at end range of shoulder elevation . Able to perform all other exercises without issues. Had greater success with not guarding, but still requires verbal cues to avoid.    Gayle Is progressing well towards her goals.   Pt prognosis is Excellent.     Pt will continue to benefit from skilled outpatient physical therapy to address the deficits listed in the problem list box on initial evaluation, provide pt/family education and to maximize pt's level of independence in the home and community environment.     Pt's spiritual, cultural and educational needs considered and pt agreeable to plan of care and goals.     Anticipated barriers to physical therapy: none     Goals:  Short Term Goals (3 Weeks):   1. Patient will be compliant with home exercise program to assist therapy in restoring pain free motion of the shoulder. - MET  2. Patient will improve impaired R elbow/wrist/hand  manual muscle tests 1/2 grade to improve strength for functional tasks.- MET  3. Patient will  improve R PROM shoulder flexion >/= 20 degrees to improve functional mobility of upper extremities.- MET  4. Patient will improve R PROM shoulder abduction >/= 20 degrees to improve functional mobility of upper extremities. -MET     Long Term Goals (6 Weeks):   1. Patient will improve FOTO score to </= 63% to demonstrate improvements in R UE carrying, moving, and handling objects  2. Patient will improve impaired shoulder manual muscle tests grade to 4/5 bilaterally to improve strength for household duties.  3. Patient will improve R PROM shoulder flexion to >/= 140 degrees to improve functional mobility of upper extremities.   4. Patient will improve R PROM shoulder abduction to >/= 140 degrees to improve functional mobility of upper extremities.  5. Patient will report pain level </= 2/10 following completion of a functional activity.      PLAN     Outpatient Physical Therapy 3 times weekly for 6 weeks to include the following interventions: Manual Therapy, Neuromuscular Re-ed, Patient Education, Therapeutic Activities, and Therapeutic Exercise. Received authorization to continue therapy x 4 weeks.     Gabino Jc, PTA

## 2023-01-03 ENCOUNTER — CLINICAL SUPPORT (OUTPATIENT)
Dept: REHABILITATION | Facility: HOSPITAL | Age: 63
End: 2023-01-03
Payer: MEDICAID

## 2023-01-03 DIAGNOSIS — M25.511 RIGHT SHOULDER PAIN, UNSPECIFIED CHRONICITY: Primary | ICD-10-CM

## 2023-01-03 DIAGNOSIS — R29.898 IMPAIRED STRENGTH OF SHOULDER MUSCLES: ICD-10-CM

## 2023-01-03 DIAGNOSIS — M25.611 DECREASED ROM OF RIGHT SHOULDER: ICD-10-CM

## 2023-01-03 PROCEDURE — 97110 THERAPEUTIC EXERCISES: CPT

## 2023-01-03 NOTE — PLAN OF CARE
[facility]  Physical Therapy Plan of Care Note    Name: Gayle Gold  Clinic Number: [id number]    Therapy Diagnosis:   Encounter Diagnoses   Name Primary?    Right shoulder pain, unspecified chronicity Yes    Decreased ROM of right shoulder     Impaired strength of shoulder muscles      Physician: Tammy Pillai PA*    Visit Date: 1/3/2023  Time In:0800  Time Out:0830  Total billable minutes: 30    Physician Orders: PT eval and treat  Medical Diagnosis from Referral:  S46.011D Traumatic complete tear of R rotator cuff / Z98.890 S/P R rotator cuff repair     Evaluation Date:11/03/2022  Authorization Period Expiration: 01/13/2023  Reassessment / POC completed: 01/03/2023  Visit # / Visits authorized: 6/ 12      Precautions: Standard  Functional Level Prior to Evaluation:   IND     SUBJECTIVE     Pt reports: an increase in R shoulder pain over the weekend after increased use. Doing much better today. Surgeon has released her to go back to work on 1/15/2023.   She was compliant with home exercise program.  Response to previous treatment: good   Functional change: increased ROM    Pain: 0/10  Location: right shoulder      OBJECTIVE     Update:   Posture: improving posture . Working on neutral shoulders to decreased slight fwd tilt.  Palpation: mild muscle guarding  R periscapula area  Sensation: light touch intact      Range of Motion/Strength:      Shoulder Right Left Improvement from 12/1/2022   PROM   WNL     flexion  130 degrees   +10    extension  NT       abduction  95 degrees    +25   adduction  NT       Internal rotation@45 deg   55 degrees   +10   ER @45 deg   50 degrees   +5   ER @ 0 deg  45 degrees    +0         U/E MMT Right Left Pain/Dysfunction with Movement   Shoulder Flexion 4/5 5/5     Shoulder Extension 4+/5 5/5     Shoulder Abduction 3+/5 5/5     Shoulder Adduction 4/5 5/5     Shoulder Internal Rotation 5/5 5/5     Shoulder External Rotation 4/5 5/5     Shoulder  ER  @ 90* Abduction 4/5 5/5     Elbow Flexion  5/5 5/5     Elbow Extension 5/5 5/5     Rhomboids 4/5 5/5     Mid Traps 4/5 5/5     Low Traps 4/5 5/5             Treatment:   Therapeutic Exercise Grid     Exercise 1  Exercise 2  Exercise 3  Exercise 4    Exercise :    RENEADEBAYO Yoel:   -Seated shld elevation  -ER/IR  -Scaption  Low Row  Wall  Slides   -flexion  - CW/CCW Theraband:   Shoulder ext  rows  Lawnmower  Serratus punch  ER/IR  Tricep press    Repetition/Time :    X 20 20 X 3 sec X 20 X 20   Resist or Assist :          Green     Comment :                Done :    Yes  Yes  Yes  Yes                     Exercise 5  Exercise 6  Exercise 7  Exercise 8    Exercise :    Scapular retraction   AROM :   Flexion  Scaption      Repetition/Time :    X 20 X 20     Resist or Assist :           Comment :          Done :    Yes  No                       Exercise 9  Exercise 10  Exercise 11  Exercise 12    Exercise :            Repetition/Time :              Resist or Assist :              Comment :             Done :                              ASSESSMENT     Update: Gayle is progressing well with therapy. Patient currently reports no pain with therapy. Improvement noted in AROM and strength R shoulder. Patient states that certain movements in her daily activities cause discomfort that quickly resolves. Exercise program was shortened to allow for reassessment today.     Goals:    Goals:  Short Term Goals (3 Weeks):   1. Patient will be compliant with home exercise program to assist therapy in restoring pain free motion of the shoulder. - MET  2. Patient will improve impaired R elbow/wrist/hand  manual muscle tests 1/2 grade to improve strength for functional tasks.- MET  3. Patient will improve R PROM shoulder flexion >/= 20 degrees to improve functional mobility of upper extremities.- MET  4. Patient will improve R PROM shoulder abduction >/= 20 degrees to improve functional mobility of upper extremities. -MET     Long Term  Goals (6 Weeks):   1. Patient will improve FOTO score to </= 63% to demonstrate improvements in R UE carrying, moving, and handling objects  2. Patient will improve impaired shoulder manual muscle tests grade to 4/5 bilaterally to improve strength for household duties- MET .  3. Patient will improve R PROM shoulder flexion to >/= 140 degrees to improve functional mobility of upper extremities. - Progressing   4. Patient will improve R PROM shoulder abduction to >/= 140 degrees to improve functional mobility of upper extremities.- Progressing   5. Patient will report pain level </= 2/10 following completion of a functional activity. - Progressing       Reasons for Recertification of Therapy:   Patient continues to have deficits in R shoulder AROM and strength limiting her ability to complete daily activities pain free. Patient's goal is to return to work in 2 weeks.     PLAN     Recommended Treatment Plan: 2 times per week for 3 weeks beginning 01/16/2023 to 02/03/2023:  Manual Therapy, Patient Education, Therapeutic Activities, and Therapeutic Exercise to restore R shoulder ROM and Strength and support return to work activities.     Ruslan Mcnamara, PT

## 2023-01-04 ENCOUNTER — CLINICAL SUPPORT (OUTPATIENT)
Dept: REHABILITATION | Facility: HOSPITAL | Age: 63
End: 2023-01-04
Payer: MEDICAID

## 2023-01-04 DIAGNOSIS — M25.611 DECREASED ROM OF RIGHT SHOULDER: ICD-10-CM

## 2023-01-04 DIAGNOSIS — R29.898 IMPAIRED STRENGTH OF SHOULDER MUSCLES: ICD-10-CM

## 2023-01-04 DIAGNOSIS — M25.511 RIGHT SHOULDER PAIN, UNSPECIFIED CHRONICITY: Primary | ICD-10-CM

## 2023-01-04 PROCEDURE — 97110 THERAPEUTIC EXERCISES: CPT

## 2023-01-04 NOTE — PROGRESS NOTES
[facility]   Physical Therapy Treatment Note     Name: Gayle Gold  Clinic Number: [id number]    Therapy Diagnosis:   Encounter Diagnoses   Name Primary?    Right shoulder pain, unspecified chronicity Yes    Decreased ROM of right shoulder     Impaired strength of shoulder muscles          Physician: Tammy Pillai PA*    Visit Date: 1/4/2023      Physician Orders: PT eval and treat  Medical Diagnosis from Referral:  S46.011D Traumatic complete tear of R rotator cuff / Z98.890 S/P R rotator cuff repair    Evaluation Date:11/3/2022  Authorization Period Expiration: 1/13/2023  Reassessment / POC Due: 1/3/2023  Visit # / Visits authorized: 7/ 12    PTA Visit: 1/5    Time In: 0800  Time Out: 0830  Total Billable Time: 30 minutes    SUBJECTIVE     Pt reports: that she is doing well today. Minimal tightness in R shoulder .   She was compliant with home exercise program.  Response to previous treatment: good   Functional change:  improve ROM     Pain: 0/10 taking   Location: right shoulder area     OBJECTIVE     Objective Measures updated at progress report unless specified.     Treatment     Gayle received the treatments listed below:      Soft tissue massage to R periscapular area and upper traps. Mobilized scapula on ribcage.      therapeutic exercises to develop strength, ROM, flexibility, and posture for 26 minutes including:  Therapeutic Exercise   Therapeutic Exercise Grid     Exercise 1  Exercise 2  Exercise 3  Exercise 4    Exercise :    PROM:   -Fwd Elevate    -ER  AAROM Dowel: -flex  -ER  -scap Wall slides :  -flex  -CW  -CCW Swiss ball:   -Row  -extension   Repetition/Time :    X 10 X 20 x20 x15   Resist or Assist :          Comment :            Done :    y y y y                  Exercise 5  Exercise 6  Exercise 7  Exercise 8    Exercise :    AROM:  -flex  -Scap   Theraband:  -Row   -Shld ext  -Lawnmower  -Seated Serratus punch  -ER/IR  -Tricep Press  Bicep Curls:  -  supinate  -neutral  Scapular retraction    Repetition/Time :    X 10 X 10 X 10 20 x 3 sec   Resist or Assist :      #2     Comment :     Rows and lawnmower onlyh     Done :     Y                     Exercise 9  Exercise 10  Exercise 11  Exercise 12    Exercise :    Post Shld Shrug Low Row STM     Repetition/Time :    X 20 X 15     Resist or Assist :      R scap  area     Comment :          Done :      yes                   Patient Education and Home Exercises     Home Exercises Provided and Patient Education Provided     Education provided:   - HEP     Written Home Exercises Provided: yes. Exercises were reviewed and Gayle was able to demonstrate them prior to the end of the session.  Gayle demonstrated good  understanding of the education provided. See EMR under Patient Instructions for exercises provided during therapy sessions    ASSESSMENT     Gayle tolerated treatment well completing all exercises and reps assigned with min VC. Advanced program today as patient is 11 weeks post op. Increased flexion and ER stretches to full ROM.     Gayle Is progressing well towards her goals.   Pt prognosis is Excellent.     Pt will continue to benefit from skilled outpatient physical therapy to address the deficits listed in the problem list box on initial evaluation, provide pt/family education and to maximize pt's level of independence in the home and community environment.     Pt's spiritual, cultural and educational needs considered and pt agreeable to plan of care and goals.     Anticipated barriers to physical therapy: none     Goals:  Short Term Goals (3 Weeks):   1. Patient will be compliant with home exercise program to assist therapy in restoring pain free motion of the shoulder. - MET  2. Patient will improve impaired R elbow/wrist/hand  manual muscle tests 1/2 grade to improve strength for functional tasks.- MET  3. Patient will improve R PROM shoulder flexion >/= 20 degrees to improve functional mobility of  upper extremities.- MET  4. Patient will improve R PROM shoulder abduction >/= 20 degrees to improve functional mobility of upper extremities. -MET     Long Term Goals (6 Weeks):   1. Patient will improve FOTO score to </= 63% to demonstrate improvements in R UE carrying, moving, and handling objects  2. Patient will improve impaired shoulder manual muscle tests grade to 4/5 bilaterally to improve strength for household duties.  3. Patient will improve R PROM shoulder flexion to >/= 140 degrees to improve functional mobility of upper extremities.   4. Patient will improve R PROM shoulder abduction to >/= 140 degrees to improve functional mobility of upper extremities.  5. Patient will report pain level </= 2/10 following completion of a functional activity.      PLAN     Outpatient Physical Therapy 3 times weekly for 6 weeks to include the following interventions: Manual Therapy, Neuromuscular Re-ed, Patient Education, Therapeutic Activities, and Therapeutic Exercise.     Ruslan Mcnamara, PT

## 2023-01-05 ENCOUNTER — ANTI-COAG VISIT (OUTPATIENT)
Dept: CARDIOLOGY | Facility: HOSPITAL | Age: 63
End: 2023-01-05
Payer: MEDICARE

## 2023-01-05 ENCOUNTER — CLINICAL SUPPORT (OUTPATIENT)
Dept: REHABILITATION | Facility: HOSPITAL | Age: 63
End: 2023-01-05
Payer: MEDICARE

## 2023-01-05 DIAGNOSIS — Z86.718 PERSONAL HISTORY OF VENOUS THROMBOSIS AND EMBOLISM: ICD-10-CM

## 2023-01-05 DIAGNOSIS — R29.898 IMPAIRED STRENGTH OF SHOULDER MUSCLES: ICD-10-CM

## 2023-01-05 DIAGNOSIS — M25.511 RIGHT SHOULDER PAIN, UNSPECIFIED CHRONICITY: Primary | ICD-10-CM

## 2023-01-05 DIAGNOSIS — M25.611 DECREASED ROM OF RIGHT SHOULDER: ICD-10-CM

## 2023-01-05 DIAGNOSIS — Z86.711 PERSONAL HISTORY OF PE (PULMONARY EMBOLISM): Primary | ICD-10-CM

## 2023-01-05 DIAGNOSIS — Z86.711 PERSONAL HISTORY OF PE (PULMONARY EMBOLISM): ICD-10-CM

## 2023-01-05 LAB — INR PPP: 1.1 (ref 2–3)

## 2023-01-05 PROCEDURE — 97110 THERAPEUTIC EXERCISES: CPT | Mod: CQ

## 2023-01-05 PROCEDURE — 99211 OFF/OP EST MAY X REQ PHY/QHP: CPT

## 2023-01-05 PROCEDURE — 85610 PROTHROMBIN TIME: CPT

## 2023-01-05 NOTE — PROGRESS NOTES
[facility]   Physical Therapy Treatment Note     Name: Gayle Gold  Clinic Number: [id number]    Therapy Diagnosis:   Encounter Diagnoses   Name Primary?    Right shoulder pain, unspecified chronicity Yes    Decreased ROM of right shoulder     Impaired strength of shoulder muscles          Physician: Tammy Pillai PA*    Visit Date: 1/5/2023      Physician Orders: PT eval and treat  Medical Diagnosis from Referral:  S46.011D Traumatic complete tear of R rotator cuff / Z98.890 S/P R rotator cuff repair    Evaluation Date:11/3/2022  Authorization Period Expiration: 1/13/2023  Reassessment / POC Completed: 1/3/2023  Visit # / Visits authorized: 8/ 12  FOTO Score: 60    PTA Visit: 1/5    Time In: 0800  Time Out: 0830  Total Billable Time: 30 minutes    SUBJECTIVE     Pt reports: that she is doing well today. Minimal tightness in R shoulder .   She was compliant with home exercise program.  Response to previous treatment: good   Functional change:  improve ROM     Pain: 0/10 taking   Location: right shoulder area     OBJECTIVE     Objective Measures updated at progress report unless specified.     Treatment     Gayle received the treatments listed below:      Soft tissue massage to R periscapular area and upper traps. Mobilized scapula on ribcage.      therapeutic exercises to develop strength, ROM, flexibility, and posture for 26 minutes including:  Therapeutic Exercise   Therapeutic Exercise Grid     Exercise 1  Exercise 2  Exercise 3  Exercise 4    Exercise :    PROM:   -Fwd Elevate    -ER  AAROM Dowel: -flex  -ER  -scap Wall slides :  -flex  -CW  -CCW Swiss ball:   -Row  -extension   Repetition/Time :    X 10 X 20 x20 x15   Resist or Assist :          Comment :            Done :    y y y y                  Exercise 5  Exercise 6  Exercise 7  Exercise 8    Exercise :    AROM:  -flex  -Scap   Theraband:  -Row   -Shld ext  -Lawnmower  -Seated Serratus punch  -ER/IR  -Tricep  Press  Bicep Curls:  - supinate  -neutral  Scapular retraction    Repetition/Time :    X 10 X 10 X 10 20 x 3 sec   Resist or Assist :      #2     Comment :     Rows and lawnmower onlyh     Done :     Y                     Exercise 9  Exercise 10  Exercise 11  Exercise 12    Exercise :    Post Shld Shrug Low Row STM     Repetition/Time :    X 20 X 15     Resist or Assist :      R scap  area     Comment :          Done :      yes                   Patient Education and Home Exercises     Home Exercises Provided and Patient Education Provided     Education provided:   - HEP     Written Home Exercises Provided: yes. Exercises were reviewed and Gayle was able to demonstrate them prior to the end of the session.  Gayle demonstrated good  understanding of the education provided. See EMR under Patient Instructions for exercises provided during therapy sessions    ASSESSMENT     Gayle tolerated treatment well completing all exercises and reps assigned with min VC.  FOTO score increased to 60.    Gayle Is progressing well towards her goals.   Pt prognosis is Excellent.     Pt will continue to benefit from skilled outpatient physical therapy to address the deficits listed in the problem list box on initial evaluation, provide pt/family education and to maximize pt's level of independence in the home and community environment.     Pt's spiritual, cultural and educational needs considered and pt agreeable to plan of care and goals.     Anticipated barriers to physical therapy: none     Goals:  Short Term Goals (3 Weeks):   1. Patient will be compliant with home exercise program to assist therapy in restoring pain free motion of the shoulder. - MET  2. Patient will improve impaired R elbow/wrist/hand  manual muscle tests 1/2 grade to improve strength for functional tasks.- MET  3. Patient will improve R PROM shoulder flexion >/= 20 degrees to improve functional mobility of upper extremities.- MET  4. Patient will improve R  PROM shoulder abduction >/= 20 degrees to improve functional mobility of upper extremities. -MET     Long Term Goals (6 Weeks):   1. Patient will improve FOTO score to </= 63% to demonstrate improvements in R UE carrying, moving, and handling objects  2. Patient will improve impaired shoulder manual muscle tests grade to 4/5 bilaterally to improve strength for household duties.  3. Patient will improve R PROM shoulder flexion to >/= 140 degrees to improve functional mobility of upper extremities.   4. Patient will improve R PROM shoulder abduction to >/= 140 degrees to improve functional mobility of upper extremities.  5. Patient will report pain level </= 2/10 following completion of a functional activity.      PLAN     Outpatient Physical Therapy 3 times weekly for 6 weeks to include the following interventions: Manual Therapy, Neuromuscular Re-ed, Patient Education, Therapeutic Activities, and Therapeutic Exercise.     Gabino Jc, PTA

## 2023-01-10 ENCOUNTER — CLINICAL SUPPORT (OUTPATIENT)
Dept: REHABILITATION | Facility: HOSPITAL | Age: 63
End: 2023-01-10
Payer: MEDICAID

## 2023-01-10 DIAGNOSIS — M25.511 RIGHT SHOULDER PAIN, UNSPECIFIED CHRONICITY: Primary | ICD-10-CM

## 2023-01-10 DIAGNOSIS — R29.898 IMPAIRED STRENGTH OF SHOULDER MUSCLES: ICD-10-CM

## 2023-01-10 DIAGNOSIS — M25.611 DECREASED ROM OF RIGHT SHOULDER: ICD-10-CM

## 2023-01-10 PROCEDURE — 97110 THERAPEUTIC EXERCISES: CPT

## 2023-01-10 NOTE — PROGRESS NOTES
[facility]   Physical Therapy Treatment Note     Name: Gayle Gold  Clinic Number: [id number]    Therapy Diagnosis:   Encounter Diagnoses   Name Primary?    Right shoulder pain, unspecified chronicity Yes    Decreased ROM of right shoulder     Impaired strength of shoulder muscles          Physician: Tammy Pillai PA*    Visit Date: 1/10/2023      Physician Orders: PT eval and treat  Medical Diagnosis from Referral:  S46.011D Traumatic complete tear of R rotator cuff / Z98.890 S/P R rotator cuff repair    Evaluation Date:11/3/2022  Authorization Period Expiration: 1/13/2023  Reassessment / POC Completed: 1/3/2023  Visit # / Visits authorized: 9/ 12  FOTO Score: 60    PTA Visit: 1/5    Time In: 0802  Time Out: 0835  Total Billable Time: 33 minutes    SUBJECTIVE     Pt reports: mild stiffness in R shoulder today. Seeing surgeon tomorrow to assess return to work status .    She was compliant with home exercise program.  Response to previous treatment: good   Functional change:  improve ROM     Pain: 0/10 taking   Location: right shoulder area     OBJECTIVE     Objective Measures updated at progress report unless specified.     Treatment     Gayle received the treatments listed below:      Soft tissue massage to R periscapular area and upper traps. Mobilized scapula on ribcage.      therapeutic exercises to develop strength, ROM, flexibility, and posture for 33 minutes including:  Therapeutic Exercise   Therapeutic Exercise Grid     Exercise 1  Exercise 2  Exercise 3  Exercise 4    Exercise :    PROM:   -Fwd Elevate    -ER  AAROM Dowel: -flex  -ER  -scap Wall slides :  -flex  -CW  -CCW Swiss ball:   -Row  -extension   Repetition/Time :    X 10 X 20 x20 x15   Resist or Assist :          Comment :            Done :     y y y                  Exercise 5  Exercise 6  Exercise 7  Exercise 8    Exercise :    AROM:  -flex  -Scap   Theraband:  -Row   -Shld ext  -Lawnmower  -Seated  Serratus punch  -ER/IR  -Tricep Press  Bicep Curls:  - supinate  -neutral  Scapular retraction    Repetition/Time :    X 10 X 10 X 10 20 x 3 sec   Resist or Assist :      #2     Comment :     Rows and lawnmower onlyh     Done :     Y                     Exercise 9  Exercise 10  Exercise 11  Exercise 12    Exercise :    Post Shld Shrug Low Row STM     Repetition/Time :    X 20 X 15     Resist or Assist :      R scap  area     Comment :          Done :      yes                   Patient Education and Home Exercises     Home Exercises Provided and Patient Education Provided     Education provided:   - HEP     Written Home Exercises Provided: yes. Exercises were reviewed and Gayle was able to demonstrate them prior to the end of the session.  Gayle demonstrated good  understanding of the education provided. See EMR under Patient Instructions for exercises provided during therapy sessions    ASSESSMENT     Gayle tolerated treatment well today . Reports mild discomfort at top of end range in shoulder elevation. Min VC required to avoid substitution patterns. Added IR stretches per protocol .     Gayle Is progressing well towards her goals.   Pt prognosis is Excellent.     Pt will continue to benefit from skilled outpatient physical therapy to address the deficits listed in the problem list box on initial evaluation, provide pt/family education and to maximize pt's level of independence in the home and community environment.     Pt's spiritual, cultural and educational needs considered and pt agreeable to plan of care and goals.     Anticipated barriers to physical therapy: none     Goals:  Short Term Goals (3 Weeks):   1. Patient will be compliant with home exercise program to assist therapy in restoring pain free motion of the shoulder. - MET  2. Patient will improve impaired R elbow/wrist/hand  manual muscle tests 1/2 grade to improve strength for functional tasks.- MET  3. Patient will improve R PROM shoulder  flexion >/= 20 degrees to improve functional mobility of upper extremities.- MET  4. Patient will improve R PROM shoulder abduction >/= 20 degrees to improve functional mobility of upper extremities. -MET     Long Term Goals (6 Weeks):   1. Patient will improve FOTO score to </= 63% to demonstrate improvements in R UE carrying, moving, and handling objects  2. Patient will improve impaired shoulder manual muscle tests grade to 4/5 bilaterally to improve strength for household duties.  3. Patient will improve R PROM shoulder flexion to >/= 140 degrees to improve functional mobility of upper extremities.   4. Patient will improve R PROM shoulder abduction to >/= 140 degrees to improve functional mobility of upper extremities.  5. Patient will report pain level </= 2/10 following completion of a functional activity.      PLAN     Outpatient Physical Therapy 3 times weekly for 6 weeks to include the following interventions: Manual Therapy, Neuromuscular Re-ed, Patient Education, Therapeutic Activities, and Therapeutic Exercise.     Ruslan Mcnamara, PT

## 2023-01-11 ENCOUNTER — CLINICAL SUPPORT (OUTPATIENT)
Dept: REHABILITATION | Facility: HOSPITAL | Age: 63
End: 2023-01-11
Payer: MEDICARE

## 2023-01-11 ENCOUNTER — OFFICE VISIT (OUTPATIENT)
Dept: ORTHOPEDICS | Facility: CLINIC | Age: 63
End: 2023-01-11
Payer: MEDICARE

## 2023-01-11 DIAGNOSIS — S46.011D TRAUMATIC COMPLETE TEAR OF RIGHT ROTATOR CUFF, SUBSEQUENT ENCOUNTER: Primary | ICD-10-CM

## 2023-01-11 DIAGNOSIS — M25.511 RIGHT SHOULDER PAIN, UNSPECIFIED CHRONICITY: Primary | ICD-10-CM

## 2023-01-11 DIAGNOSIS — M25.611 DECREASED ROM OF RIGHT SHOULDER: ICD-10-CM

## 2023-01-11 DIAGNOSIS — R29.898 IMPAIRED STRENGTH OF SHOULDER MUSCLES: ICD-10-CM

## 2023-01-11 PROCEDURE — 99024 PR POST-OP FOLLOW-UP VISIT: ICD-10-PCS | Mod: ,,,

## 2023-01-11 PROCEDURE — 97110 THERAPEUTIC EXERCISES: CPT | Mod: CQ

## 2023-01-11 PROCEDURE — 1160F PR REVIEW ALL MEDS BY PRESCRIBER/CLIN PHARMACIST DOCUMENTED: ICD-10-PCS | Mod: CPTII,,,

## 2023-01-11 PROCEDURE — 1160F RVW MEDS BY RX/DR IN RCRD: CPT | Mod: CPTII,,,

## 2023-01-11 PROCEDURE — 99024 POSTOP FOLLOW-UP VISIT: CPT | Mod: ,,,

## 2023-01-11 PROCEDURE — 1159F MED LIST DOCD IN RCRD: CPT | Mod: CPTII,,,

## 2023-01-11 PROCEDURE — 1159F PR MEDICATION LIST DOCUMENTED IN MEDICAL RECORD: ICD-10-PCS | Mod: CPTII,,,

## 2023-01-11 NOTE — LETTER
[facility]  [address] [place] [address] [address]  [place], [address]  Phone: [phone]  Fax: [phone]    Name:Gayle Gold  :1960   Date:2023       PATIENT IS ABLE TO RETURN TO WORK AS OF: 23      [x] REGULAR DUTY: [_] No Restrictions. [x] With Restrictions (See comments below): No heavy lifting greater than 5 pounds with right upper extremity.     COMMENTS      Freddie Jay MD / LIGIA Cherry

## 2023-01-11 NOTE — PROGRESS NOTES
[facility]   Physical Therapy Treatment Note     Name: Gayle Gold  Clinic Number: [id number]    Therapy Diagnosis:   No diagnosis found.        Physician: Tammy Pillai PA*    Visit Date: 1/11/2023      Physician Orders: PT eval and treat  Medical Diagnosis from Referral:  S46.011D Traumatic complete tear of R rotator cuff / Z98.890 S/P R rotator cuff repair    Evaluation Date:11/3/2022  Authorization Period Expiration: 1/13/2023  Reassessment / POC Completed: 1/3/2023  Visit # / Visits authorized: 9/ 12  FOTO Score: 60    PTA Visit: 1/5    Time In: 1500   Time Out: 1530  Total Billable Time: 30 minutes    SUBJECTIVE     Pt reports: doctor released her to return to work on Monday with a 5 lb lift restriction.   She was compliant with home exercise program.  Response to previous treatment: good   Functional change:  improve ROM     Pain: 0/10 taking   Location: right shoulder area     OBJECTIVE     Objective Measures updated at progress report unless specified.     Treatment     Gayle received the treatments listed below:      Soft tissue massage to R periscapular area and upper traps. Mobilized scapula on ribcage.      therapeutic exercises to develop strength, ROM, flexibility, and posture for 30 minutes including:  Therapeutic Exercise   Therapeutic Exercise Grid     Exercise 1  Exercise 2  Exercise 3  Exercise 4    Exercise :    PROM:   -Fwd Elevate    -ER  AAROM Dowel: -flex  -ER  -scap Wall slides :  -flex  -CW  -CCW Swiss ball:   -Row  -extension   Repetition/Time :    X 10 X 20 x20 x15   Resist or Assist :          Comment :            Done :     y y y                  Exercise 5  Exercise 6  Exercise 7  Exercise 8    Exercise :    AROM:  -flex  -Scap   Theraband:  -Row   -Shld ext  -Lawnmower  -Seated Serratus punch  -ER/IR  -Tricep Press  Bicep Curls:  - supinate  -neutral  Scapular retraction    Repetition/Time :    X 10 X 10 X 10 20 x 3 sec   Resist or Assist :       #2     Comment :     Rows and lawnmower onlyh     Done :     Y                     Exercise 9  Exercise 10  Exercise 11  Exercise 12    Exercise :    Post Shld Shrug Low Row STM     Repetition/Time :    X 20 X 15     Resist or Assist :      R scap  area     Comment :          Done :      yes                   Patient Education and Home Exercises     Home Exercises Provided and Patient Education Provided     Education provided:   - HEP     Written Home Exercises Provided: yes. Exercises were reviewed and Gayle was able to demonstrate them prior to the end of the session.  Gayle demonstrated good  understanding of the education provided. See EMR under Patient Instructions for exercises provided during therapy sessions    ASSESSMENT     Patient completed all exercises with good effort. Increased reps of thera-band exercises to 20 with good tolerance. Min VC required to avoid substitution patterns.    Gayle Is progressing well towards her goals.   Pt prognosis is Excellent.     Pt will continue to benefit from skilled outpatient physical therapy to address the deficits listed in the problem list box on initial evaluation, provide pt/family education and to maximize pt's level of independence in the home and community environment.     Pt's spiritual, cultural and educational needs considered and pt agreeable to plan of care and goals.     Anticipated barriers to physical therapy: none     Goals:  Short Term Goals (3 Weeks):   1. Patient will be compliant with home exercise program to assist therapy in restoring pain free motion of the shoulder. - MET  2. Patient will improve impaired R elbow/wrist/hand  manual muscle tests 1/2 grade to improve strength for functional tasks.- MET  3. Patient will improve R PROM shoulder flexion >/= 20 degrees to improve functional mobility of upper extremities.- MET  4. Patient will improve R PROM shoulder abduction >/= 20 degrees to improve functional mobility of upper extremities.  -MET     Long Term Goals (6 Weeks):   1. Patient will improve FOTO score to </= 63% to demonstrate improvements in R UE carrying, moving, and handling objects  2. Patient will improve impaired shoulder manual muscle tests grade to 4/5 bilaterally to improve strength for household duties.  3. Patient will improve R PROM shoulder flexion to >/= 140 degrees to improve functional mobility of upper extremities.   4. Patient will improve R PROM shoulder abduction to >/= 140 degrees to improve functional mobility of upper extremities.  5. Patient will report pain level </= 2/10 following completion of a functional activity.      PLAN     Outpatient Physical Therapy 3 times weekly for 6 weeks to include the following interventions: Manual Therapy, Neuromuscular Re-ed, Patient Education, Therapeutic Activities, and Therapeutic Exercise.     Gabino Jc, PTA

## 2023-01-11 NOTE — PROGRESS NOTES
Subjective:    CC: Follow-up of the Right Shoulder and Follow-up (Rt shoulder zack ROTH RTC 10/17/22-1/15/23 pt states shoulder is good this morning no pain, only when she moves a certain way. no pain meds taken,pt goes to PT 3 times a week,states therapy is helping alot.)       HPI:  Patient returns to clinic for post op visit. Status post right rotator cuff repair. Almost 3 months out. The patients pain is well managed on OTC Tylenol; no longer taking narcotics. The patient states no signs of infection. Attending formal physical therapy.States she feels ready to return to work with restrictions. No new complaints.    ROS: Refer to HPI for pertinent ROS. All other 12 point systems negative.    Objective:    Vitals:        Physical Exam:  Right upper extremity compartments are soft and warm. There are no signs or symptoms of DVT or infection. Incision sites are well healed, clean, and dry. non tender to palpation about shoulder. Passive range of motion shows that the patient has 180 degrees of abduction and forward flexion; 170 active ROM. Neurovascularly intact distally.    Images:  Previous Images Reviewed and discussed with patient.    Assessment:  1. Traumatic complete tear of right rotator cuff, subsequent encounter       Plan:  Physical exam and intraoperative findings again discussed with the patient.  She is progressing very well.  Patient would like to continue to attend formal physical therapy to work on strengthening.  We discussed her work duties; she will return full duty with restriction of no lifting greater than 5 lb. The Patient was instructed to take OTC pain medication as needed with appropriate precautions. I would like to see the patient back in 6 weeks to assess the patients progress and re-evaluate work restrictions.    Follow up: Follow up in about 6 weeks (around 2/22/2023).

## 2023-01-12 ENCOUNTER — CLINICAL SUPPORT (OUTPATIENT)
Dept: REHABILITATION | Facility: HOSPITAL | Age: 63
End: 2023-01-12
Payer: MEDICAID

## 2023-01-12 ENCOUNTER — ANTI-COAG VISIT (OUTPATIENT)
Dept: CARDIOLOGY | Facility: HOSPITAL | Age: 63
End: 2023-01-12
Attending: INTERNAL MEDICINE
Payer: MEDICARE

## 2023-01-12 DIAGNOSIS — M25.611 DECREASED ROM OF RIGHT SHOULDER: ICD-10-CM

## 2023-01-12 DIAGNOSIS — Z86.718 PERSONAL HISTORY OF VENOUS THROMBOSIS AND EMBOLISM: ICD-10-CM

## 2023-01-12 DIAGNOSIS — Z98.890 S/P RIGHT ROTATOR CUFF REPAIR: Primary | ICD-10-CM

## 2023-01-12 DIAGNOSIS — M25.511 RIGHT SHOULDER PAIN, UNSPECIFIED CHRONICITY: Primary | ICD-10-CM

## 2023-01-12 DIAGNOSIS — Z86.711 PERSONAL HISTORY OF PE (PULMONARY EMBOLISM): ICD-10-CM

## 2023-01-12 DIAGNOSIS — R29.898 IMPAIRED STRENGTH OF SHOULDER MUSCLES: ICD-10-CM

## 2023-01-12 LAB — INR PPP: 1.8 (ref 2–3)

## 2023-01-12 PROCEDURE — 97110 THERAPEUTIC EXERCISES: CPT

## 2023-01-12 PROCEDURE — 85610 PROTHROMBIN TIME: CPT

## 2023-01-12 NOTE — PROGRESS NOTES
[facility]   Physical Therapy Treatment Note     Name: Gayle Gold  Clinic Number: [id number]    Therapy Diagnosis:   Encounter Diagnoses   Name Primary?    Right shoulder pain, unspecified chronicity Yes    Decreased ROM of right shoulder     Impaired strength of shoulder muscles            Physician: Tammy Pillai PA*    Visit Date: 1/12/2023      Physician Orders: PT eval and treat  Medical Diagnosis from Referral:  S46.011D Traumatic complete tear of R rotator cuff / Z98.890 S/P R rotator cuff repair    Evaluation Date:11/3/2022  Authorization Period Expiration: 1/13/2023  Reassessment / POC Completed: 1/3/2023  Visit # / Visits authorized: 11/ 12  FOTO Score: 60    PTA Visit: 1/5    Time In: 0800   Time Out: 0830  Total Billable Time: 30 minutes    SUBJECTIVE     Pt reports: no problems today. Ready to return to work next week with restrictions.  She was compliant with home exercise program.  Response to previous treatment: good   Functional change:  improve ROM     Pain: 0/10 taking   Location: right shoulder area     OBJECTIVE     Objective Measures updated at progress report unless specified.     Treatment     Gayle received the treatments listed below:      Soft tissue massage to R periscapular area and upper traps. Mobilized scapula on ribcage.      therapeutic exercises to develop strength, ROM, flexibility, and posture for 30 minutes including:  Therapeutic Exercise   Therapeutic Exercise Grid     Exercise 1  Exercise 2  Exercise 3  Exercise 4    Exercise :    PROM:   -Fwd Elevate    -ER  AAROM Dowel: -flex  -ER  -scap Wall slides :  -flex  -CW  -CCW Swiss ball:   -Row  -extension   Repetition/Time :    X 10 X 20 x20 x15   Resist or Assist :          Comment :            Done :     y y n                  Exercise 5  Exercise 6  Exercise 7  Exercise 8    Exercise :    AROM:  -flex  -Scap   Theraband:  -Row   -Shld ext  -Lawnmower  -Seated Serratus  punch  -ER/IR  -Tricep Press  Bicep Curls:  - supinate  -neutral  Scapular retraction    Repetition/Time :    X 10 X 10 X 15 20 x 3 sec   Resist or Assist :    #1  Red TB     Comment :     Rows and lawnmower onlyh     Done :    y  y                    Exercise 9  Exercise 10  Exercise 11  Exercise 12    Exercise :    IR towel stretch Low Row STM  Door stretch    Repetition/Time :    X 20 X 15  3 x 10sec   Resist or Assist :      R scap  area     Comment :          Done :    y  yes y                  Patient Education and Home Exercises     Home Exercises Provided and Patient Education Provided     Education provided:   - HEP     Written Home Exercises Provided: yes. Exercises were reviewed and Gayle was able to demonstrate them prior to the end of the session.  Gayle demonstrated good  understanding of the education provided. See EMR under Patient Instructions for exercises provided during therapy sessions    ASSESSMENT     Gayle tolerated treatment well today with min VC needed for proper exercise execution. Noted improved AAROM elevation . Mod tightness in pecs with initial door stretches.     Gayle Is progressing well towards her goals.   Pt prognosis is Excellent.     Pt will continue to benefit from skilled outpatient physical therapy to address the deficits listed in the problem list box on initial evaluation, provide pt/family education and to maximize pt's level of independence in the home and community environment.     Pt's spiritual, cultural and educational needs considered and pt agreeable to plan of care and goals.     Anticipated barriers to physical therapy: none     Goals:  Short Term Goals (3 Weeks):   1. Patient will be compliant with home exercise program to assist therapy in restoring pain free motion of the shoulder. - MET  2. Patient will improve impaired R elbow/wrist/hand  manual muscle tests 1/2 grade to improve strength for functional tasks.- MET  3. Patient will improve R PROM shoulder  flexion >/= 20 degrees to improve functional mobility of upper extremities.- MET  4. Patient will improve R PROM shoulder abduction >/= 20 degrees to improve functional mobility of upper extremities. -MET     Long Term Goals (6 Weeks):   1. Patient will improve FOTO score to </= 63% to demonstrate improvements in R UE carrying, moving, and handling objects  2. Patient will improve impaired shoulder manual muscle tests grade to 4/5 bilaterally to improve strength for household duties.  3. Patient will improve R PROM shoulder flexion to >/= 140 degrees to improve functional mobility of upper extremities.   4. Patient will improve R PROM shoulder abduction to >/= 140 degrees to improve functional mobility of upper extremities.  5. Patient will report pain level </= 2/10 following completion of a functional activity.      PLAN     Patient has completed present authorization for therapy. Awaiting approval of extension for 2 x  3 additional weeks to complete program to include  Manual Therapy, Neuromuscular Re-ed, Patient Education, Therapeutic Activities, and Therapeutic Exercise.     Ruslan Mcnamara, PT

## 2023-01-19 ENCOUNTER — CLINICAL SUPPORT (OUTPATIENT)
Dept: REHABILITATION | Facility: HOSPITAL | Age: 63
End: 2023-01-19
Payer: MEDICAID

## 2023-01-19 DIAGNOSIS — M25.611 DECREASED ROM OF RIGHT SHOULDER: ICD-10-CM

## 2023-01-19 DIAGNOSIS — R29.898 IMPAIRED STRENGTH OF SHOULDER MUSCLES: ICD-10-CM

## 2023-01-19 DIAGNOSIS — M25.511 RIGHT SHOULDER PAIN, UNSPECIFIED CHRONICITY: Primary | ICD-10-CM

## 2023-01-19 PROCEDURE — 97110 THERAPEUTIC EXERCISES: CPT

## 2023-01-19 NOTE — PROGRESS NOTES
[facility]   Physical Therapy Treatment Note     Name: Gayle Gold  Clinic Number: [id number]    Therapy Diagnosis:   Encounter Diagnoses   Name Primary?    Right shoulder pain, unspecified chronicity Yes    Decreased ROM of right shoulder     Impaired strength of shoulder muscles      Physician: Tammy Pillai PA*    Visit Date: 1/19/2023      Physician Orders: PT eval and treat  Medical Diagnosis from Referral:  S46.011D Traumatic complete tear of R rotator cuff / Z98.890 S/P R rotator cuff repair    Evaluation Date:11/3/2022  Authorization Period Expiration: 2/3/2023  Reassessment / POC Completed: 1/3/2023  Visit # / Visits authorized: 1/6  FOTO Score: 60    PTA Visit: 1/5    Time In: 13:00   Time Out: 13:30  Total Billable Time: 30 minutes    SUBJECTIVE     Pt reports: she started work on 1/16/23 not doing things too strenuous only lifting things less then 5 lbs. Reports her biggest problem is lifting arm overhead. Saw surgeon on 1/11/23  She was compliant with home exercise program.  Response to previous treatment: good   Functional change:  improve ROM     Shoulder functional FOTO score: 53 declined from 60 on last reassessment, improved from 43 on eval    Pain: 0/10 taking   Location: right shoulder area     OBJECTIVE     Objective Measures updated at progress report unless specified.     Treatment     Gayle received the treatments listed below:      therapeutic exercises to develop strength, ROM, flexibility, and posture for 30 minutes including:  Therapeutic Exercise   Therapeutic Exercise Grid     Exercise 1  Exercise 2  Exercise 3  Exercise 4    Exercise :    PROM:   -Fwd Elevate    -ER  AAROM Dowel: -flex  -ER  -scap Wall slides :  -flex  -CW  -CCW Swiss ball:   -Row  -extension   Repetition/Time :    X 10 X 20 x20 x15   Resist or Assist :          Comment :        Rainbows, flexion    Done :     no yes no                  Exercise 5  Exercise 6  Exercise 7   Exercise 8    Exercise :    AROM:  -flex  -Scap   Theraband:  -Row   -Shld ext  -Lawnmower  -Seated Serratus punch  -ER/IR  -Tricep Press  Bicep Curls:  - supinate  -neutral  Scapular retraction    Repetition/Time :    20 20 20 20 x 3 sec   Resist or Assist :    #1 Green TB 5 lbs     Comment :          Done :    yes yes yes                    Exercise 9  Exercise 10  Exercise 11  Exercise 12    Exercise :    IR towel stretch Low Row STM  Door stretch    Repetition/Time :    X 20 X 15  3 x 10sec   Resist or Assist :      R scap  area     Comment :          Done :    no no no no                  Patient Education and Home Exercises     Home Exercises Provided and Patient Education Provided     Education provided:   - HEP     Written Home Exercises Provided: yes. Exercises were reviewed and Gayle was able to demonstrate them prior to the end of the session.  Gayle demonstrated good  understanding of the education provided. See EMR under Patient Instructions for exercises provided during therapy sessions    ASSESSMENT     R shoulder overhead ROM and strength progressing well however continues with difficulty with abduction and flexion strength. Added horizontal abduction with light resistance however unable to perform due to weakness and pain. Discussed continued healing required      Gayle Is progressing well towards her goals.   Pt prognosis is Excellent.     Pt will continue to benefit from skilled outpatient physical therapy to address the deficits listed in the problem list box on initial evaluation, provide pt/family education and to maximize pt's level of independence in the home and community environment.     Pt's spiritual, cultural and educational needs considered and pt agreeable to plan of care and goals.     Anticipated barriers to physical therapy: none     Goals:  Short Term Goals (3 Weeks):   1. Patient will be compliant with home exercise program to assist therapy in restoring pain free motion of the  shoulder. - MET  2. Patient will improve impaired R elbow/wrist/hand  manual muscle tests 1/2 grade to improve strength for functional tasks.- MET  3. Patient will improve R PROM shoulder flexion >/= 20 degrees to improve functional mobility of upper extremities.- MET  4. Patient will improve R PROM shoulder abduction >/= 20 degrees to improve functional mobility of upper extremities. -MET     Long Term Goals (6 Weeks):   1. Patient will improve FOTO score to </= 63% to demonstrate improvements in R UE carrying, moving, and handling objects  2. Patient will improve impaired shoulder manual muscle tests grade to 4/5 bilaterally to improve strength for household duties.  3. Patient will improve R PROM shoulder flexion to >/= 140 degrees to improve functional mobility of upper extremities.   4. Patient will improve R PROM shoulder abduction to >/= 140 degrees to improve functional mobility of upper extremities.  5. Patient will report pain level </= 2/10 following completion of a functional activity.      PLAN     Patient has completed present authorization for therapy. Awaiting approval of extension for 2 x  3 additional weeks to complete program to include  Manual Therapy, Neuromuscular Re-ed, Patient Education, Therapeutic Activities, and Therapeutic Exercise.     Juanita Garcia, PT

## 2023-01-20 ENCOUNTER — CLINICAL SUPPORT (OUTPATIENT)
Dept: REHABILITATION | Facility: HOSPITAL | Age: 63
End: 2023-01-20
Payer: MEDICAID

## 2023-01-20 DIAGNOSIS — M25.511 RIGHT SHOULDER PAIN, UNSPECIFIED CHRONICITY: Primary | ICD-10-CM

## 2023-01-20 DIAGNOSIS — R29.898 IMPAIRED STRENGTH OF SHOULDER MUSCLES: ICD-10-CM

## 2023-01-20 DIAGNOSIS — M25.611 DECREASED ROM OF RIGHT SHOULDER: ICD-10-CM

## 2023-01-20 PROCEDURE — 97110 THERAPEUTIC EXERCISES: CPT

## 2023-01-20 NOTE — PROGRESS NOTES
[facility]   Physical Therapy Treatment Note     Name: Gayle Gold  Clinic Number: [id number]    Therapy Diagnosis:   Encounter Diagnoses   Name Primary?    Right shoulder pain, unspecified chronicity Yes    Decreased ROM of right shoulder     Impaired strength of shoulder muscles        Physician: Tammy Pillai PA*    Visit Date: 1/20/2023    Physician Orders: PT eval and treat  Medical Diagnosis from Referral:  S46.011D Traumatic complete tear of R rotator cuff / Z98.890 S/P R rotator cuff repair    Evaluation Date:11/3/2022  Authorization Period Expiration: 2/3/2023  Reassessment / POC Completed: 1/3/2023  Visit # / Visits authorized: 2/6  FOTO Score: 60    PTA Visit: 1/5    Time In: 13:00   Time Out: 13:30  Total Billable Time: 30 minutes    SUBJECTIVE     Pt reports: no increased shoulder pain after last session   She was compliant with home exercise program.  Response to previous treatment: good   Functional change:  improve ROM     Shoulder functional FOTO score: 53 declined from 60 on last reassessment, improved from 43 on eval    Pain: 0/10 taking   Location: right shoulder area     OBJECTIVE     Objective Measures updated at progress report unless specified.     Treatment     Gayle received the treatments listed below:      therapeutic exercises to develop strength, ROM, flexibility, and posture for 30 minutes including:  Therapeutic Exercise   Therapeutic Exercise Grid     Exercise 1  Exercise 2  Exercise 3  Exercise 4    Exercise :    PROM:   -Fwd Elevate    -ER  AAROM Dowel: -flex  -ER  -scap Wall slides :  -flex  -CW  -CCW Swiss ball:   -Row  -extension   Repetition/Time :    X 10 X 20 x20 x15   Resist or Assist :          Comment :        Rainbows, flexion    Done :    no no yes no                  Exercise 5  Exercise 6  Exercise 7  Exercise 8    Exercise :    AROM:  -flex  -Scap   Theraband:  -Row   -Shld ext  -Lawnmower  -Seated Serratus  punch  -ER/IR  -Tricep Press  Bicep Curls:  - supinate  -neutral  Scapular retraction    Repetition/Time :    20 20 20 20 x 3 sec   Resist or Assist :    #1 Green TB 5 lbs     Comment :      Neutral only    Done :    yes yes yes                    Exercise 9  Exercise 10  Exercise 11  Exercise 12    Exercise :    IR towel stretch Low Row STM  Door stretch    Repetition/Time :    15 x 5 sec X 15  5 x 15 sec   Resist or Assist :      R scap  area     Comment :          Done :    yes no no yes                  Patient Education and Home Exercises     Home Exercises Provided and Patient Education Provided     Education provided:   - HEP     Written Home Exercises Provided: yes. Exercises were reviewed and Gayle was able to demonstrate them prior to the end of the session.  Gayle demonstrated good  understanding of the education provided. See EMR under Patient Instructions for exercises provided during therapy sessions    ASSESSMENT     Continues with limited ER and IR ROM with cues required for prolonged stretch at end range. Overhead flexion and abduction AROM improving       Gayle Is progressing well towards her goals.   Pt prognosis is Excellent.     Pt will continue to benefit from skilled outpatient physical therapy to address the deficits listed in the problem list box on initial evaluation, provide pt/family education and to maximize pt's level of independence in the home and community environment.     Pt's spiritual, cultural and educational needs considered and pt agreeable to plan of care and goals.     Anticipated barriers to physical therapy: none     Goals:  Short Term Goals (3 Weeks):   1. Patient will be compliant with home exercise program to assist therapy in restoring pain free motion of the shoulder. - MET  2. Patient will improve impaired R elbow/wrist/hand  manual muscle tests 1/2 grade to improve strength for functional tasks.- MET  3. Patient will improve R PROM shoulder flexion >/= 20 degrees  to improve functional mobility of upper extremities.- MET  4. Patient will improve R PROM shoulder abduction >/= 20 degrees to improve functional mobility of upper extremities. -MET     Long Term Goals (6 Weeks):   1. Patient will improve FOTO score to </= 63% to demonstrate improvements in R UE carrying, moving, and handling objects  2. Patient will improve impaired shoulder manual muscle tests grade to 4/5 bilaterally to improve strength for household duties.  3. Patient will improve R PROM shoulder flexion to >/= 140 degrees to improve functional mobility of upper extremities.   4. Patient will improve R PROM shoulder abduction to >/= 140 degrees to improve functional mobility of upper extremities.  5. Patient will report pain level </= 2/10 following completion of a functional activity.      PLAN     Patient has completed present authorization for therapy. Awaiting approval of extension for 2 x  3 additional weeks to complete program to include  Manual Therapy, Neuromuscular Re-ed, Patient Education, Therapeutic Activities, and Therapeutic Exercise.     Juanita Garcia, PT

## 2023-01-24 ENCOUNTER — CLINICAL SUPPORT (OUTPATIENT)
Dept: REHABILITATION | Facility: HOSPITAL | Age: 63
End: 2023-01-24
Payer: MEDICARE

## 2023-01-24 DIAGNOSIS — R29.898 IMPAIRED STRENGTH OF SHOULDER MUSCLES: ICD-10-CM

## 2023-01-24 DIAGNOSIS — M25.611 DECREASED ROM OF RIGHT SHOULDER: Primary | ICD-10-CM

## 2023-01-24 DIAGNOSIS — M25.511 RIGHT SHOULDER PAIN, UNSPECIFIED CHRONICITY: ICD-10-CM

## 2023-01-24 PROCEDURE — 97110 THERAPEUTIC EXERCISES: CPT | Mod: CQ

## 2023-01-24 NOTE — PROGRESS NOTES
[facility]   Physical Therapy Treatment Note     Name: Gayle Gold  Clinic Number: [id number]    Therapy Diagnosis:   Encounter Diagnoses   Name Primary?    Decreased ROM of right shoulder Yes    Impaired strength of shoulder muscles     Right shoulder pain, unspecified chronicity          Physician: Tammy Pillai PA*    Visit Date: 1/24/2023    Physician Orders: PT eval and treat  Medical Diagnosis from Referral:  S46.011D Traumatic complete tear of R rotator cuff / Z98.890 S/P R rotator cuff repair    Evaluation Date:11/3/2022  Authorization Period Expiration: 2/3/2023  Reassessment / POC Completed: 1/3/2023  Visit # / Visits authorized: 3/6  FOTO Score: 60    PTA Visit: 1/5    Time In: 13:00   Time Out: 13:30  Total Billable Time: 30 minutes    SUBJECTIVE     Pt reports: no increased shoulder pain after last session   She was compliant with home exercise program.  Response to previous treatment: good   Functional change:  improve ROM     Shoulder functional FOTO score: 53 declined from 60 on last reassessment, improved from 43 on eval    Pain: 0/10 taking   Location: right shoulder area     OBJECTIVE     Objective Measures updated at progress report unless specified.     Treatment     Gayle received the treatments listed below:      therapeutic exercises to develop strength, ROM, flexibility, and posture for 30 minutes including:  Therapeutic Exercise   Therapeutic Exercise Grid     Exercise 1  Exercise 2  Exercise 3  Exercise 4    Exercise :    PROM:   -Fwd Elevate    -ER  AAROM Dowel: -flex  -ER  -scap Wall slides :  -flex  -CW  -CCW Swiss ball:   -Row  -extension   Repetition/Time :    X 10 X 20 x20 x15   Resist or Assist :          Comment :        Rainbows, flexion    Done :    no no yes no                  Exercise 5  Exercise 6  Exercise 7  Exercise 8    Exercise :    AROM:  -flex  -Scap   Theraband:  -Row   -Shld ext  -Lawnmower  -Seated Serratus  punch  -ER/IR  -Tricep Press  Bicep Curls:  - supinate  -neutral  Scapular retraction    Repetition/Time :    20 20 20 20 x 3 sec   Resist or Assist :    #1 Green TB 5 lbs     Comment :      Neutral only    Done :    yes yes yes                    Exercise 9  Exercise 10  Exercise 11  Exercise 12    Exercise :    IR towel stretch Low Row STM  Door stretch    Repetition/Time :    15 x 5 sec X 15  5 x 15 sec   Resist or Assist :      R scap  area     Comment :          Done :    yes no no yes                  Patient Education and Home Exercises     Home Exercises Provided and Patient Education Provided     Education provided:   - HEP     Written Home Exercises Provided: yes. Exercises were reviewed and Gayle was able to demonstrate them prior to the end of the session.  Gayle demonstrated good  understanding of the education provided. See EMR under Patient Instructions for exercises provided during therapy sessions    ASSESSMENT     Continues with limited ER and IR ROM with cues required for prolonged stretch at end range. Overhead flexion and abduction AROM improving   Mild cues to avoid R shoulder hiking.     Gayle Is progressing well towards her goals.   Pt prognosis is Excellent.     Pt will continue to benefit from skilled outpatient physical therapy to address the deficits listed in the problem list box on initial evaluation, provide pt/family education and to maximize pt's level of independence in the home and community environment.     Pt's spiritual, cultural and educational needs considered and pt agreeable to plan of care and goals.     Anticipated barriers to physical therapy: none     Goals:  Short Term Goals (3 Weeks):   1. Patient will be compliant with home exercise program to assist therapy in restoring pain free motion of the shoulder. - MET  2. Patient will improve impaired R elbow/wrist/hand  manual muscle tests 1/2 grade to improve strength for functional tasks.- MET  3. Patient will improve R  PROM shoulder flexion >/= 20 degrees to improve functional mobility of upper extremities.- MET  4. Patient will improve R PROM shoulder abduction >/= 20 degrees to improve functional mobility of upper extremities. -MET     Long Term Goals (6 Weeks):   1. Patient will improve FOTO score to </= 63% to demonstrate improvements in R UE carrying, moving, and handling objects  2. Patient will improve impaired shoulder manual muscle tests grade to 4/5 bilaterally to improve strength for household duties.  3. Patient will improve R PROM shoulder flexion to >/= 140 degrees to improve functional mobility of upper extremities.   4. Patient will improve R PROM shoulder abduction to >/= 140 degrees to improve functional mobility of upper extremities.  5. Patient will report pain level </= 2/10 following completion of a functional activity.      PLAN     Patient has completed present authorization for therapy. Awaiting approval of extension for 2 x  3 additional weeks to complete program to include  Manual Therapy, Neuromuscular Re-ed, Patient Education, Therapeutic Activities, and Therapeutic Exercise.     Gabino Jc, PTA

## 2023-01-26 ENCOUNTER — CLINICAL SUPPORT (OUTPATIENT)
Dept: REHABILITATION | Facility: HOSPITAL | Age: 63
End: 2023-01-26
Payer: MEDICAID

## 2023-01-26 DIAGNOSIS — M25.511 RIGHT SHOULDER PAIN, UNSPECIFIED CHRONICITY: Primary | ICD-10-CM

## 2023-01-26 DIAGNOSIS — M25.611 DECREASED ROM OF RIGHT SHOULDER: ICD-10-CM

## 2023-01-26 DIAGNOSIS — R29.898 IMPAIRED STRENGTH OF SHOULDER MUSCLES: ICD-10-CM

## 2023-01-26 PROCEDURE — 97110 THERAPEUTIC EXERCISES: CPT

## 2023-01-26 NOTE — PROGRESS NOTES
[facility]   Physical Therapy Treatment Note     Name: Gayle Gold  Clinic Number: [id number]    Therapy Diagnosis:   Encounter Diagnoses   Name Primary?    Right shoulder pain, unspecified chronicity Yes    Decreased ROM of right shoulder     Impaired strength of shoulder muscles          Physician: Tammy Pillai PA*    Visit Date: 1/26/2023    Physician Orders: PT eval and treat  Medical Diagnosis from Referral:  S46.011D Traumatic complete tear of R rotator cuff / Z98.890 S/P R rotator cuff repair    Evaluation Date:11/3/2022  Authorization Period Expiration: 2/3/2023  Reassessment / POC Completed: 1/3/2023  Visit # / Visits authorized: 4/6  FOTO Score: 60    PTA Visit: 0/5    Time In: 13:00   Time Out: 13:35  Total Billable Time: 35 minutes    SUBJECTIVE     Pt reports: no increased shoulder pain after last session   She was compliant with home exercise program.  Response to previous treatment: good   Functional change:  improve ROM     Shoulder functional FOTO score: 53 declined from 60 on last reassessment, improved from 43 on eval    Pain: 0/10 taking   Location: right shoulder area     OBJECTIVE     Objective Measures updated at progress report unless specified.     Treatment     Gayle received the treatments listed below:      therapeutic exercises to develop strength, ROM, flexibility, and posture for 30 minutes including:  Therapeutic Exercise   Therapeutic Exercise Grid     Exercise 1  Exercise 2  Exercise 3  Exercise 4    Exercise :    Self-mobilization: flex, abd, ER  AAROM Dowel: -flex  -ER  -scap Wall slides :  -flex  -CW  -CCW Swiss ball:   -Row  -extension   Repetition/Time :    X 10 X 20 x20 x15   Resist or Assist :          Comment :        Rainbows, flexion    Done :    yes no yes no                  Exercise 5  Exercise 6  Exercise 7  Exercise 8    Exercise :    AROM: -flex,   -Scap   Theraband:  -Row   -Lawnmower  -Seated Serratus  punch  -ER/IR  -Tricep Press  Bicep Curls:  - supinate  -neutral  Scapular retraction    Repetition/Time :    10 20 20 20 x 3 sec   Resist or Assist :     Green TB 5 lbs     Comment :    [place]aption only Row, lawnmower, serratus punch only Neutral only    Done :    yes yes no no                   Exercise 9  Exercise 10  Exercise 11  Exercise 12    Exercise :    IR towel stretch Low Row STM  Door stretch    Repetition/Time :    15 x 5 sec X 15  5 x 15 sec   Resist or Assist :      R scap  area     Comment :          Done :    yes no no no                  Exercise 13  Exercise 14 Exercise 15 Exercise 16   Exercise :    Butterfly: scap retraction in ER  supine Inf  glide set seated Ext stretch from chair arms     Repetition/Time :    2 x 10 X 10 5    Resist or Assist :           Comment :          Done :    yes yes yes                 Patient Education and Home Exercises     Home Exercises Provided and Patient Education Provided     Education provided:   - HEP     Written Home Exercises Provided: yes. Exercises were reviewed and Gayle was able to demonstrate them prior to the end of the session.  Gayle demonstrated good  understanding of the education provided. See EMR under Patient Instructions for exercises provided during therapy sessions    ASSESSMENT     Gayle was instructed in new stretches and self-mobilizations to facilitate ROM.  Written copies of exercises/stretches provided.    Gayle Is progressing well towards her goals.   Pt prognosis is Excellent.     Pt will continue to benefit from skilled outpatient physical therapy to address the deficits listed in the problem list box on initial evaluation, provide pt/family education and to maximize pt's level of independence in the home and community environment.     Pt's spiritual, cultural and educational needs considered and pt agreeable to plan of care and goals.     Anticipated barriers to physical therapy: none     Goals:  Short Term Goals (3 Weeks):   1.  Patient will be compliant with home exercise program to assist therapy in restoring pain free motion of the shoulder. - MET  2. Patient will improve impaired R elbow/wrist/hand  manual muscle tests 1/2 grade to improve strength for functional tasks.- MET  3. Patient will improve R PROM shoulder flexion >/= 20 degrees to improve functional mobility of upper extremities.- MET  4. Patient will improve R PROM shoulder abduction >/= 20 degrees to improve functional mobility of upper extremities. -MET     Long Term Goals (6 Weeks):   1. Patient will improve FOTO score to </= 63% to demonstrate improvements in R UE carrying, moving, and handling objects  2. Patient will improve impaired shoulder manual muscle tests grade to 4/5 bilaterally to improve strength for household duties.  3. Patient will improve R PROM shoulder flexion to >/= 140 degrees to improve functional mobility of upper extremities.   4. Patient will improve R PROM shoulder abduction to >/= 140 degrees to improve functional mobility of upper extremities.  5. Patient will report pain level </= 2/10 following completion of a functional activity.      PLAN     Patient has completed present authorization for therapy. Awaiting approval of extension for 2 x  3 additional weeks to complete program to include  Manual Therapy, Neuromuscular Re-ed, Patient Education, Therapeutic Activities, and Therapeutic Exercise.     Clara Atwood, PT

## 2023-01-31 ENCOUNTER — CLINICAL SUPPORT (OUTPATIENT)
Dept: REHABILITATION | Facility: HOSPITAL | Age: 63
End: 2023-01-31
Payer: MEDICARE

## 2023-01-31 DIAGNOSIS — M25.611 DECREASED ROM OF RIGHT SHOULDER: ICD-10-CM

## 2023-01-31 DIAGNOSIS — R29.898 IMPAIRED STRENGTH OF SHOULDER MUSCLES: ICD-10-CM

## 2023-01-31 DIAGNOSIS — M25.511 RIGHT SHOULDER PAIN, UNSPECIFIED CHRONICITY: Primary | ICD-10-CM

## 2023-01-31 PROCEDURE — 97110 THERAPEUTIC EXERCISES: CPT

## 2023-01-31 NOTE — PROGRESS NOTES
[facility]   Physical Therapy Treatment Note     Name: Gayle Gold  Clinic Number: [id number]    Therapy Diagnosis:   Encounter Diagnoses   Name Primary?    Right shoulder pain, unspecified chronicity Yes    Decreased ROM of right shoulder     Impaired strength of shoulder muscles          Physician: Tammy Pillai PA*    Visit Date: 1/31/2023    Physician Orders: PT eval and treat  Medical Diagnosis from Referral:  S46.011D Traumatic complete tear of R rotator cuff / Z98.890 S/P R rotator cuff repair    Evaluation Date:11/3/2022  Authorization Period Expiration: 2/3/2023  Reassessment / POC Completed: 1/3/2023  Visit # / Visits authorized: 5/6  FOTO Score: 60    PTA Visit: 0/5    Time In: 13:00   Time Out: 13:35  Total Billable Time: 35 minutes    SUBJECTIVE     Pt reports: that she is doing well at work. Avoiding lifting over 15 pounds and overhead.   She was compliant with home exercise program.  Response to previous treatment: good   Functional change:  improve ROM     Shoulder functional FOTO score: 53 declined from 60 on last reassessment, improved from 43 on eval    Pain: 0/10 taking   Location: right shoulder area     OBJECTIVE     Objective Measures updated at progress report unless specified.     Treatment     Gayle received the treatments listed below:      therapeutic exercises to develop strength, ROM, flexibility, and posture for 30 minutes including:  Therapeutic Exercise   Therapeutic Exercise Grid     Exercise 1  Exercise 2  Exercise 3  Exercise 4    Exercise :    Self-mobilization: flex, abd, ER  AAROM Dowel: -flex  -ER  -scap Wall slides :  -flex  -CW  -CCW Swiss ball:   -Row  -extension   Repetition/Time :    X 10 X 20 x20 x15   Resist or Assist :          Comment :        Rainbows, flexion    Done :    yes no N o no                  Exercise 5  Exercise 6  Exercise 7  Exercise 8    Exercise :    AROM: -flex,   -Scap   Theraband:  -Row    -Lawnmower  -Seated Serratus punch  -ER/IR  -Tricep Press  Bicep Curls:  - supinate  -neutral  Scapular retraction    Repetition/Time :    10 20 20 20 x 3 sec   Resist or Assist :     Green TB 5 lbs     Comment :    Scaption only Row, lawnmower, serratus punch only Neutral only    Done :    No  yes no Yes                    Exercise 9  Exercise 10  Exercise 11  Exercise 12    Exercise :    IR towel stretch Low Row STM  Door stretch    Repetition/Time :    15 x 5 sec X 15  5 x 15 sec   Resist or Assist :      R scap  area     Comment :          Done :    yes no no Yes                   Exercise 13  Exercise 14 Exercise 15 Exercise 16   Exercise :    Butterfly: scap retraction in ER  supine Inf  glide set seated Ext stretch from chair arms  Wall push up   Repetition/Time :    2 x 10 X 10 5 X 10   Resist or Assist :           Comment :          Done :    yes No  No  Yes                   Exercise 13  Exercise 14 Exercise 15 Exercise 16   Exercise :      Quadriped    -push up Quadriped   - shifting fwd/bkwd  -lateral  Quadriped   -fwd reach   -lateral reach     Repetition/Time :    X 10 X 10 x10    Resist or Assist :          Comment :          Done :    Yes  Yes  Yes                  Patient Education and Home Exercises     Home Exercises Provided and Patient Education Provided     Education provided:   - HEP     Written Home Exercises Provided: yes. Exercises were reviewed and Gayle was able to demonstrate them prior to the end of the session.  Gayle demonstrated good  understanding of the education provided. See EMR under Patient Instructions for exercises provided during therapy sessions    ASSESSMENT     Gayle tolerated treatment well today with addition of weightbearing exercises in quadriped position . Min reports of tightness R shoulder. Worked on R shoulder positioning and posture.     Gayle Is progressing well towards her goals.   Pt prognosis is Excellent.     Pt will continue to benefit from skilled  outpatient physical therapy to address the deficits listed in the problem list box on initial evaluation, provide pt/family education and to maximize pt's level of independence in the home and community environment.     Pt's spiritual, cultural and educational needs considered and pt agreeable to plan of care and goals.     Anticipated barriers to physical therapy: none     Goals:  Short Term Goals (3 Weeks):   1. Patient will be compliant with home exercise program to assist therapy in restoring pain free motion of the shoulder. - MET  2. Patient will improve impaired R elbow/wrist/hand  manual muscle tests 1/2 grade to improve strength for functional tasks.- MET  3. Patient will improve R PROM shoulder flexion >/= 20 degrees to improve functional mobility of upper extremities.- MET  4. Patient will improve R PROM shoulder abduction >/= 20 degrees to improve functional mobility of upper extremities. -MET     Long Term Goals (6 Weeks):   1. Patient will improve FOTO score to </= 63% to demonstrate improvements in R UE carrying, moving, and handling objects  2. Patient will improve impaired shoulder manual muscle tests grade to 4/5 bilaterally to improve strength for household duties.  3. Patient will improve R PROM shoulder flexion to >/= 140 degrees to improve functional mobility of upper extremities.   4. Patient will improve R PROM shoulder abduction to >/= 140 degrees to improve functional mobility of upper extremities.  5. Patient will report pain level </= 2/10 following completion of a functional activity.      PLAN     Patient has completed present authorization for therapy. Awaiting approval of extension for 2 x  3 additional weeks to complete program to include  Manual Therapy, Neuromuscular Re-ed, Patient Education, Therapeutic Activities, and Therapeutic Exercise.     Ruslan Mcnamara, PT

## 2023-02-01 ENCOUNTER — CLINICAL SUPPORT (OUTPATIENT)
Dept: REHABILITATION | Facility: HOSPITAL | Age: 63
End: 2023-02-01
Payer: MEDICARE

## 2023-02-01 DIAGNOSIS — M25.511 RIGHT SHOULDER PAIN, UNSPECIFIED CHRONICITY: Primary | ICD-10-CM

## 2023-02-01 DIAGNOSIS — R29.898 IMPAIRED STRENGTH OF SHOULDER MUSCLES: ICD-10-CM

## 2023-02-01 DIAGNOSIS — M25.611 DECREASED ROM OF RIGHT SHOULDER: ICD-10-CM

## 2023-02-01 PROCEDURE — 97110 THERAPEUTIC EXERCISES: CPT

## 2023-02-01 NOTE — PLAN OF CARE
[facility]  Physical Therapy Discharge Note    Name: Gayle Gold  Clinic Number: [id number]    Therapy Diagnosis:   Encounter Diagnoses   Name Primary?    Right shoulder pain, unspecified chronicity Yes    Decreased ROM of right shoulder     Impaired strength of shoulder muscles      Physician: Tammy Pillai PA*    Physician Orders: PT eval and treat  Medical Diagnosis: S46.011D Traumatic complete tear of R rotator cuff / Z98.890 S/P R rotator cuff repair    PT diagnosis: Decreased R shoulder AROM and strength , R shoulder pain   Evaluation Date: 11/3/22    Date of Last visit: 02/01/2023  Total Visits Received: 35    Time In: 1335  Time Out: 1410  Total Billable Time: 35 minutes    SUBJECTIVE     Pt reports: that she is doing well in her daily activities and at work. Taking proper precautions for R shoulder repair.Will be following up with ortho surgeon in 4 weeks.   She was compliant with home exercise program.  Response to previous treatment: good   Functional change: improved R shoulder AROM     Pain: 0/10  Location: right shoulder      OBJECTIVE     Update:   Posture: improving posture and decreased shoulder elevation on R   Palpation: unremarkable   Sensation: light touch intact      Range of Motion/Strength:      Shoulder Right Left Improvement from 01/03/2023   PROM   WNL     flexion  140 degrees   +10    extension  NT       abduction  110 degrees    +15   adduction  NT       Internal rotation@45 deg   60 degrees   +5   ER @45 deg   50 degrees   +0   ER @ 0 deg  45 degrees    +0         U/E MMT Right Left Pain/Dysfunction with Movement   Shoulder Flexion 4+/5 5/5     Shoulder Extension 4+/5 5/5     Shoulder Abduction 4/5 5/5     Shoulder Adduction 4/5 5/5     Shoulder Internal Rotation 5/5 5/5     Shoulder External Rotation 4/5 5/5     Shoulder ER  @ 90* Abduction 4/5 5/5     Elbow Flexion  5/5 5/5     Elbow Extension 5/5 5/5     Rhomboids 4/5 5/5     Mid Traps 4/5 5/5      Low Traps 4/5 5/5                 Treatment     Gayle received the treatments listed below:      Therapeutic Exercise Grid     Exercise 1  Exercise 2  Exercise 3  Exercise 4    Exercise :    Self-mobilization: flex, abd, ER  AAROM Dowel: -flex  -ER  -scap Wall slides :  -flex  -CW  -CCW Swiss ball:   -Row  -extension   Repetition/Time :    X 10 X 20 x20 x15   Resist or Assist :              Comment :         Rainbows, flexion     Done :    Yes  Yes                       Exercise 5  Exercise 6  Exercise 7  Exercise 8    Exercise :    AROM: -flex,   -Scap   Theraband:  -Row   -Lawnmower  -Seated Serratus punch  -ER/IR  -Tricep Press  Bicep Curls:  - supinate  -neutral  Scapular retraction    Repetition/Time :    10 20 20 20 x 3 sec   Resist or Assist :      Green TB 5 lbs      Comment :    Scaption only Row, lawnmower, serratus punch only Neutral only     Done :                           Exercise 9  Exercise 10  Exercise 11  Exercise 12    Exercise :    IR towel stretch Low Row STM  Door stretch    Repetition/Time :    15 x 5 sec X 15   5 x 15 sec   Resist or Assist :        R scap  area      Comment :          Done :                           Exercise 13  Exercise 14 Exercise 15 Exercise 16   Exercise :    Butterfly: scap retraction in ER  supine Inf  glide set seated Ext stretch from chair arms  Wall push up   Repetition/Time :    2 x 10 X 10 5 X 10   Resist or Assist :              Comment :              Done :                           Exercise 13  Exercise 14 Exercise 15 Exercise 16   Exercise :       Quadriped    -push up Quadriped   - shifting fwd/bkwd  -lateral  Quadriped   -fwd reach   -lateral reach  IR/ER standing 90* ABD    Repetition/Time :    X 10 X 10 x10 x10    Resist or Assist :              Comment :              Done :       Yes                     Patient Education and Home Exercises     Home Exercises Provided and Patient Education Provided     Education provided:   Pt has written HEP issued on  initial eval, educated pt on importance and benefits of exercises and stretches, and encouraged pt to continue with HEP daily    ASSESSMENT     Gayle has progressed with therapy as seen by improved R shoulder AROM and Strength. Patient has successfully returned to work with no reports of pain. Gayle is currently performing HEP daily .     Gayle Is progressing well towards her goals.   Pt prognosis is Excellent.     Pt's spiritual, cultural and educational needs considered and pt agreeable to plan of care and goals.     Anticipated barriers to physical therapy: none    Discharge reason: Patient has met all of his/her goals and Patient has completed allowable visits authorized by insurance    Goals:   Short Term Goals (3 Weeks):   1. Patient will be compliant with home exercise program to assist therapy in restoring pain free motion of the shoulder. - MET  2. Patient will improve impaired R elbow/wrist/hand  manual muscle tests 1/2 grade to improve strength for functional tasks.- MET  3. Patient will improve R PROM shoulder flexion >/= 20 degrees to improve functional mobility of upper extremities.- MET  4. Patient will improve R PROM shoulder abduction >/= 20 degrees to improve functional mobility of upper extremities. -MET     Long Term Goals (6 Weeks):   1. Patient will improve FOTO score to </= 63% to demonstrate improvements in R UE carrying, moving, and handling objects-MET  2. Patient will improve impaired shoulder manual muscle tests grade to 4/5 bilaterally to improve strength for household duties.-MET  3. Patient will improve R PROM shoulder flexion to >/= 140 degrees to improve functional mobility of upper extremities. -MET  4. Patient will improve R PROM shoulder abduction to >/= 140 degrees to improve functional mobility of upper extremities.NOT MET = 110 deg  5. Patient will report pain level </= 2/10 following completion of a functional activity. - MET     PLAN   This patient is discharged from  Physical Therapy. Patient will return to ortho surgeon in 4 weeks for follow. Was instructed in written HEP.       Ruslan Mcnamara, PT

## 2023-02-02 ENCOUNTER — ANTI-COAG VISIT (OUTPATIENT)
Dept: CARDIOLOGY | Facility: HOSPITAL | Age: 63
End: 2023-02-02
Attending: INTERNAL MEDICINE
Payer: MEDICAID

## 2023-02-02 DIAGNOSIS — Z86.718 PERSONAL HISTORY OF VENOUS THROMBOSIS AND EMBOLISM: ICD-10-CM

## 2023-02-02 DIAGNOSIS — Z86.711 PERSONAL HISTORY OF PE (PULMONARY EMBOLISM): ICD-10-CM

## 2023-02-02 LAB — INR PPP: 2.6 (ref 2–3)

## 2023-02-02 PROCEDURE — 99211 OFF/OP EST MAY X REQ PHY/QHP: CPT

## 2023-02-02 PROCEDURE — 85610 PROTHROMBIN TIME: CPT

## 2023-02-22 ENCOUNTER — OFFICE VISIT (OUTPATIENT)
Dept: ORTHOPEDICS | Facility: CLINIC | Age: 63
End: 2023-02-22
Payer: MEDICARE

## 2023-02-22 VITALS
WEIGHT: 210 LBS | SYSTOLIC BLOOD PRESSURE: 108 MMHG | DIASTOLIC BLOOD PRESSURE: 73 MMHG | HEART RATE: 73 BPM | HEIGHT: 67 IN | BODY MASS INDEX: 32.96 KG/M2

## 2023-02-22 DIAGNOSIS — S46.011D TRAUMATIC COMPLETE TEAR OF RIGHT ROTATOR CUFF, SUBSEQUENT ENCOUNTER: Primary | ICD-10-CM

## 2023-02-22 DIAGNOSIS — M75.41 SHOULDER IMPINGEMENT SYNDROME, RIGHT: ICD-10-CM

## 2023-02-22 PROCEDURE — 3008F BODY MASS INDEX DOCD: CPT | Mod: CPTII,,, | Performed by: ORTHOPAEDIC SURGERY

## 2023-02-22 PROCEDURE — 3008F PR BODY MASS INDEX (BMI) DOCUMENTED: ICD-10-PCS | Mod: CPTII,,, | Performed by: ORTHOPAEDIC SURGERY

## 2023-02-22 PROCEDURE — 3074F PR MOST RECENT SYSTOLIC BLOOD PRESSURE < 130 MM HG: ICD-10-PCS | Mod: CPTII,,, | Performed by: ORTHOPAEDIC SURGERY

## 2023-02-22 PROCEDURE — 4010F PR ACE/ARB THEARPY RXD/TAKEN: ICD-10-PCS | Mod: CPTII,,, | Performed by: ORTHOPAEDIC SURGERY

## 2023-02-22 PROCEDURE — 3074F SYST BP LT 130 MM HG: CPT | Mod: CPTII,,, | Performed by: ORTHOPAEDIC SURGERY

## 2023-02-22 PROCEDURE — 3078F PR MOST RECENT DIASTOLIC BLOOD PRESSURE < 80 MM HG: ICD-10-PCS | Mod: CPTII,,, | Performed by: ORTHOPAEDIC SURGERY

## 2023-02-22 PROCEDURE — 1159F PR MEDICATION LIST DOCUMENTED IN MEDICAL RECORD: ICD-10-PCS | Mod: CPTII,,, | Performed by: ORTHOPAEDIC SURGERY

## 2023-02-22 PROCEDURE — 99213 PR OFFICE/OUTPT VISIT, EST, LEVL III, 20-29 MIN: ICD-10-PCS | Mod: ,,, | Performed by: ORTHOPAEDIC SURGERY

## 2023-02-22 PROCEDURE — 4010F ACE/ARB THERAPY RXD/TAKEN: CPT | Mod: CPTII,,, | Performed by: ORTHOPAEDIC SURGERY

## 2023-02-22 PROCEDURE — 3078F DIAST BP <80 MM HG: CPT | Mod: CPTII,,, | Performed by: ORTHOPAEDIC SURGERY

## 2023-02-22 PROCEDURE — 1159F MED LIST DOCD IN RCRD: CPT | Mod: CPTII,,, | Performed by: ORTHOPAEDIC SURGERY

## 2023-02-22 PROCEDURE — 99213 OFFICE O/P EST LOW 20 MIN: CPT | Mod: ,,, | Performed by: ORTHOPAEDIC SURGERY

## 2023-02-22 NOTE — PROGRESS NOTES
"Subjective:    CC: Follow-up of the Right Shoulder and Follow-up (F/u Rt shoulder sad, zack RTC 10/17/22 pt states pain sometimes taking tylenol PRN. )       HPI:  Patient returns today for repeat exam.  Patient states her right shoulder is doing much better.  She is back to return to work, she occasionally take Tylenol, she denies any new complaints.    ROS: Refer to HPI for pertinent ROS. All other 12 point systems negative.    Objective:  Vitals:    02/22/23 0809   BP: 108/73   BP Location: Left arm   Patient Position: Sitting   BP Method: Large (Automatic)   Pulse: 73   Weight: 95.3 kg (210 lb)   Height: 5' 7" (1.702 m)        Physical Exam:  Patient is well-nourished and well-developed, in no apparent distress, pleasant and cooperative. Examination of the right upper extremity compartments are soft and warm.  Skin is intact. There are no signs or symptoms of DVT or infection.   Patient is tender to palpation along the  lateral aspect .  Patient is able to forward flex and abduct to 160.  Negative Atkinson and Neers, negative empty can, negative drop arm test. Negative sulcus sign. Stable to stressing. Neurovascularly intact distally.    Images: . Images Reviewed and discussed with patient.    Assessment:  1. Traumatic complete tear of right rotator cuff, subsequent encounter    2. Shoulder impingement syndrome, right        Plan:  At this time we discussed her physical exam and previous intraoperative findings.  She is healed nicely she will continue weightbear as tolerated, shoulder exercises as needed.  I would like see him back with any problems or difficulties.    Follow UP: No follow-ups on file.              "

## 2023-03-02 ENCOUNTER — ANTI-COAG VISIT (OUTPATIENT)
Dept: CARDIOLOGY | Facility: HOSPITAL | Age: 63
End: 2023-03-02
Attending: INTERNAL MEDICINE
Payer: MEDICARE

## 2023-03-02 DIAGNOSIS — Z86.718 PERSONAL HISTORY OF VENOUS THROMBOSIS AND EMBOLISM: ICD-10-CM

## 2023-03-02 DIAGNOSIS — Z86.711 PERSONAL HISTORY OF PE (PULMONARY EMBOLISM): ICD-10-CM

## 2023-03-02 LAB — INR PPP: 2.1 (ref 2–3)

## 2023-03-02 PROCEDURE — 85610 PROTHROMBIN TIME: CPT

## 2023-03-02 PROCEDURE — 99211 OFF/OP EST MAY X REQ PHY/QHP: CPT

## 2023-03-07 ENCOUNTER — ANTI-COAG VISIT (OUTPATIENT)
Dept: CARDIOLOGY | Facility: HOSPITAL | Age: 63
End: 2023-03-07
Attending: INTERNAL MEDICINE
Payer: MEDICARE

## 2023-03-07 DIAGNOSIS — Z86.711 PERSONAL HISTORY OF PE (PULMONARY EMBOLISM): ICD-10-CM

## 2023-03-07 DIAGNOSIS — Z86.718 PERSONAL HISTORY OF VENOUS THROMBOSIS AND EMBOLISM: ICD-10-CM

## 2023-03-07 LAB — INR PPP: 3.2 (ref 2–3)

## 2023-03-07 PROCEDURE — 85610 PROTHROMBIN TIME: CPT

## 2023-04-04 ENCOUNTER — ANTI-COAG VISIT (OUTPATIENT)
Dept: CARDIOLOGY | Facility: HOSPITAL | Age: 63
End: 2023-04-04
Attending: INTERNAL MEDICINE
Payer: MEDICARE

## 2023-04-04 DIAGNOSIS — Z86.711 PERSONAL HISTORY OF PE (PULMONARY EMBOLISM): ICD-10-CM

## 2023-04-04 DIAGNOSIS — Z86.718 PERSONAL HISTORY OF VENOUS THROMBOSIS AND EMBOLISM: ICD-10-CM

## 2023-04-04 LAB — INR PPP: 2 (ref 2–3)

## 2023-04-04 PROCEDURE — 99211 OFF/OP EST MAY X REQ PHY/QHP: CPT

## 2023-04-04 PROCEDURE — 85610 PROTHROMBIN TIME: CPT

## 2023-04-17 ENCOUNTER — HOSPITAL ENCOUNTER (EMERGENCY)
Facility: HOSPITAL | Age: 63
Discharge: HOME | End: 2023-04-17
Attending: FAMILY MEDICINE
Payer: MEDICARE

## 2023-04-17 VITALS
OXYGEN SATURATION: 99 % | HEIGHT: 67 IN | BODY MASS INDEX: 32.96 KG/M2 | HEART RATE: 88 BPM | TEMPERATURE: 99 F | WEIGHT: 210 LBS | RESPIRATION RATE: 20 BRPM | SYSTOLIC BLOOD PRESSURE: 161 MMHG | DIASTOLIC BLOOD PRESSURE: 82 MMHG

## 2023-04-17 DIAGNOSIS — R52 PAIN: ICD-10-CM

## 2023-04-17 DIAGNOSIS — R42 DIZZINESS: ICD-10-CM

## 2023-04-17 LAB
ANION GAP SERPL CALC-SCNC: 10 MEQ/L
APPEARANCE UR: ABNORMAL
BACTERIA #/AREA URNS AUTO: NORMAL /HPF
BASOPHILS # BLD AUTO: 0.02 X10(3)/MCL (ref 0–0.2)
BASOPHILS NFR BLD AUTO: 0.3 %
BILIRUB UR QL STRIP.AUTO: NEGATIVE MG/DL
BUN SERPL-MCNC: 14.9 MG/DL (ref 9.8–20.1)
CALCIUM SERPL-MCNC: 10 MG/DL (ref 8.4–10.2)
CHLORIDE SERPL-SCNC: 103 MMOL/L (ref 98–107)
CO2 SERPL-SCNC: 26 MMOL/L (ref 23–31)
COLOR UR AUTO: YELLOW
CREAT SERPL-MCNC: 0.85 MG/DL (ref 0.55–1.02)
CREAT/UREA NIT SERPL: 18
EOSINOPHIL # BLD AUTO: 0.23 X10(3)/MCL (ref 0–0.9)
EOSINOPHIL NFR BLD AUTO: 3.5 %
ERYTHROCYTE [DISTWIDTH] IN BLOOD BY AUTOMATED COUNT: 14.5 % (ref 11.5–17)
GFR SERPLBLD CREATININE-BSD FMLA CKD-EPI: >60 MLS/MIN/1.73/M2
GLUCOSE SERPL-MCNC: 219 MG/DL (ref 82–115)
GLUCOSE UR QL STRIP.AUTO: >=1000 MG/DL
HCT VFR BLD AUTO: 43.6 % (ref 37–47)
HGB BLD-MCNC: 13.5 G/DL (ref 12–16)
IMM GRANULOCYTES # BLD AUTO: 0.01 X10(3)/MCL (ref 0–0.04)
IMM GRANULOCYTES NFR BLD AUTO: 0.2 %
KETONES UR QL STRIP.AUTO: NEGATIVE MG/DL
LEUKOCYTE ESTERASE UR QL STRIP.AUTO: NEGATIVE UNIT/L
LYMPHOCYTES # BLD AUTO: 2.73 X10(3)/MCL (ref 0.6–4.6)
LYMPHOCYTES NFR BLD AUTO: 41.9 %
MAGNESIUM SERPL-MCNC: 2 MG/DL (ref 1.6–2.6)
MCH RBC QN AUTO: 27.7 PG (ref 27–31)
MCHC RBC AUTO-ENTMCNC: 31 G/DL (ref 33–36)
MCV RBC AUTO: 89.5 FL (ref 80–94)
MONOCYTES # BLD AUTO: 0.31 X10(3)/MCL (ref 0.1–1.3)
MONOCYTES NFR BLD AUTO: 4.8 %
NEUTROPHILS # BLD AUTO: 3.21 X10(3)/MCL (ref 2.1–9.2)
NEUTROPHILS NFR BLD AUTO: 49.3 %
NITRITE UR QL STRIP.AUTO: NEGATIVE
PH UR STRIP.AUTO: 7.5 [PH]
PLATELET # BLD AUTO: 242 X10(3)/MCL (ref 130–400)
PMV BLD AUTO: 10.1 FL (ref 7.4–10.4)
POC CARDIAC TROPONIN I: 0.01 NG/ML (ref 0–0.08)
POTASSIUM SERPL-SCNC: 4.9 MMOL/L (ref 3.5–5.1)
PROT UR QL STRIP.AUTO: NEGATIVE MG/DL
RBC # BLD AUTO: 4.87 X10(6)/MCL (ref 4.2–5.4)
RBC #/AREA URNS AUTO: NORMAL /HPF
RBC UR QL AUTO: ABNORMAL UNIT/L
SAMPLE: NORMAL
SODIUM SERPL-SCNC: 139 MMOL/L (ref 136–145)
SP GR UR STRIP.AUTO: 1.02
SQUAMOUS #/AREA URNS AUTO: NORMAL /HPF
TSH SERPL-ACNC: 1.41 UIU/ML (ref 0.35–4.94)
UROBILINOGEN UR STRIP-ACNC: 0.2 MG/DL
WBC # SPEC AUTO: 6.5 X10(3)/MCL (ref 4.5–11.5)
WBC #/AREA URNS AUTO: NORMAL /HPF

## 2023-04-17 PROCEDURE — 99285 EMERGENCY DEPT VISIT HI MDM: CPT | Mod: 25

## 2023-04-17 PROCEDURE — 85025 COMPLETE CBC W/AUTO DIFF WBC: CPT | Performed by: FAMILY MEDICINE

## 2023-04-17 PROCEDURE — 84484 ASSAY OF TROPONIN QUANT: CPT

## 2023-04-17 PROCEDURE — 93010 EKG 12-LEAD: ICD-10-PCS | Mod: ,,, | Performed by: STUDENT IN AN ORGANIZED HEALTH CARE EDUCATION/TRAINING PROGRAM

## 2023-04-17 PROCEDURE — 93005 ELECTROCARDIOGRAM TRACING: CPT

## 2023-04-17 PROCEDURE — 25000003 PHARM REV CODE 250: Performed by: FAMILY MEDICINE

## 2023-04-17 PROCEDURE — 81001 URINALYSIS AUTO W/SCOPE: CPT | Performed by: FAMILY MEDICINE

## 2023-04-17 PROCEDURE — 84443 ASSAY THYROID STIM HORMONE: CPT | Performed by: FAMILY MEDICINE

## 2023-04-17 PROCEDURE — 83735 ASSAY OF MAGNESIUM: CPT | Performed by: FAMILY MEDICINE

## 2023-04-17 PROCEDURE — 93010 ELECTROCARDIOGRAM REPORT: CPT | Mod: ,,, | Performed by: STUDENT IN AN ORGANIZED HEALTH CARE EDUCATION/TRAINING PROGRAM

## 2023-04-17 PROCEDURE — 80048 BASIC METABOLIC PNL TOTAL CA: CPT | Performed by: FAMILY MEDICINE

## 2023-04-17 PROCEDURE — 96360 HYDRATION IV INFUSION INIT: CPT

## 2023-04-17 RX ORDER — MECLIZINE HYDROCHLORIDE 25 MG/1
25 TABLET ORAL 3 TIMES DAILY PRN
Qty: 20 TABLET | Refills: 0 | Status: SHIPPED | OUTPATIENT
Start: 2023-04-17

## 2023-04-17 RX ORDER — MECLIZINE HYDROCHLORIDE 25 MG/1
25 TABLET ORAL
Status: COMPLETED | OUTPATIENT
Start: 2023-04-17 | End: 2023-04-17

## 2023-04-17 RX ADMIN — SODIUM CHLORIDE 1000 ML: 9 INJECTION, SOLUTION INTRAVENOUS at 06:04

## 2023-04-17 RX ADMIN — MECLIZINE HYDROCHLORIDE 25 MG: 25 TABLET ORAL at 07:04

## 2023-04-17 NOTE — DISCHARGE INSTRUCTIONS
No work for today and tomorrow recommend take meds as prescribed hydrate herself follow up with your PCP this week return to ER for any worsening of condition

## 2023-04-17 NOTE — ED NOTES
Not sure if she passed out or not. Also c/o right shoulder pain . Had surgery shoulder six months ago and wants it check. Denies ant SOB

## 2023-04-17 NOTE — Clinical Note
"Gayle Guidry" Asif was seen and treated in our emergency department on 4/17/2023.  She may return to work on 04/19/2023.       If you have any questions or concerns, please don't hesitate to call.      Mary Park RN    "

## 2023-04-17 NOTE — ED PROVIDER NOTES
Encounter Date: 4/17/2023       History     Chief Complaint   Patient presents with    Dizziness     States walking at work and became dizzy and fell hitting right side of head.     63-year-old complains of some dizziness said she has some vertigo worse with position changes she had real dizzy this morning and fell at work hit her head no loss of consciousness no nausea vomiting diarrhea no fevers chills also had some pain in her right shoulder from the fall no neck pain no recent infectious no fevers or chills      Review of patient's allergies indicates:   Allergen Reactions    Tramadol Itching     Past Medical History:   Diagnosis Date    Claustrophobia     Diabetes mellitus, type 2     DVT (deep venous thrombosis)     High cholesterol     Hypertension     Pulmonary embolism      Past Surgical History:   Procedure Laterality Date    ARTHROSCOPIC REPAIR OF ROTATOR CUFF OF SHOULDER Right 10/17/2022    Procedure: REPAIR, ROTATOR CUFF, ARTHROSCOPIC;  Surgeon: Freddie Jay MD;  Location: [facility];  Service: Orthopedics;  Laterality: Right;    HYSTERECTOMY       Family History   Problem Relation Age of Onset    Diabetes Mother     Stroke Father     Heart disease Sister     Clotting disorder Sister      Social History     Tobacco Use    Smoking status: Every Day     Packs/day: 0.50     Years: 32.00     Pack years: 16.00     Types: Cigarettes    Smokeless tobacco: Never   Substance Use Topics    Alcohol use: Never    Drug use: Never     Review of Systems   Neurological:  Positive for dizziness.   All other systems reviewed and are negative.    Physical Exam     Initial Vitals [04/17/23 0607]   BP Pulse Resp Temp SpO2   131/74 88 20 98.5 °F (36.9 °C) 99 %      MAP       --         Physical Exam    Nursing note and vitals reviewed.  Constitutional: She appears well-developed and well-nourished. She is active.   HENT:   Head: Normocephalic and atraumatic.   Eyes: Conjunctivae, EOM and lids are normal. Pupils are equal,  round, and reactive to light.   Neck: Trachea normal and phonation normal. Neck supple. No thyroid mass present.   Normal range of motion.  Cardiovascular:  Normal rate, regular rhythm, normal heart sounds and normal pulses.           Pulmonary/Chest: Breath sounds normal.   Abdominal: Abdomen is soft. Bowel sounds are normal.   Musculoskeletal:         General: Normal range of motion.      Cervical back: Normal range of motion and neck supple.     Neurological: She is alert and oriented to person, place, and time.   Skin: Skin is warm, dry and intact.   Psychiatric: She has a normal mood and affect. Her speech is normal and behavior is normal. Judgment and thought content normal. Cognition and memory are normal.       ED Course   Procedures  Labs Reviewed   BASIC METABOLIC PANEL - Abnormal; Notable for the following components:       Result Value    Glucose Level 219 (*)     All other components within normal limits   URINALYSIS, REFLEX TO URINE CULTURE - Abnormal; Notable for the following components:    Appearance, UA Slightly Cloudy (*)     Glucose, UA >=1000 (*)     Blood, UA Trace-Intact (*)     All other components within normal limits   CBC WITH DIFFERENTIAL - Abnormal; Notable for the following components:    MCHC 31.0 (*)     All other components within normal limits   MAGNESIUM - Normal   URINALYSIS, MICROSCOPIC - Normal   CBC W/ AUTO DIFFERENTIAL    Narrative:     The following orders were created for panel order CBC Auto Differential.  Procedure                               Abnormality         Status                     ---------                               -----------         ------                     CBC with Differential[[id number]]        Abnormal            Final result                 Please view results for these tests on the individual orders.   TROPONIN ISTAT   TSH   POCT TROPONIN     EKG Readings: (Independently Interpreted)   Initial Reading: No STEMI. Rhythm: Normal Sinus Rhythm. Heart  Rate: 74. Ectopy: No Ectopy. Conduction: Normal. ST Segments: Normal ST Segments. T Waves: Normal. Clinical Impression: Normal Sinus Rhythm   ECG Results              EKG 12-lead (In process)  Result time 04/17/23 07:04:06      In process by Interface, Lab In [facility] (04/17/23 07:04:06)                   Narrative:    Test Reason : R42,    Vent. Rate : 074 BPM     Atrial Rate : 074 BPM     P-R Int : 150 ms          QRS Dur : 080 ms      QT Int : 412 ms       P-R-T Axes : 059 008 061 degrees     QTc Int : 457 ms    Normal sinus rhythm  Possible Left atrial enlargement  Borderline Abnormal ECG  When compared with ECG of 10-OCT-2022 13:52,  No significant change was found    Referred By: [email or web address]   SELF           Confirmed By:                                   Imaging Results              CT Head Without Contrast (Final result)  Result time 04/17/23 08:46:30      Final result by Butch Anthony MD (04/17/23 08:46:30)                   Narrative:    EXAMINATION  CT HEAD WITHOUT CONTRAST    CLINICAL HISTORY  dizzy;    TECHNIQUE  Axial non-contrast CT images of the head were acquired and multiplanar reconstructions accomplished by a CT technologist at a separate workstation, pushed to PACS for physician review.    COMPARISON  None available at the time of the initial interpretation.    FINDINGS  Images were reviewed in subdural, brain, soft tissue, and bone windows.    Exam quality: Motion/streak artifact limits assessment of the posterior fossa.    Hemorrhage: No evidence of acute hyperattenuating blood products.    Parenchyma: There is diffuse bilateral supratentorial white matter hypoattenuation, typical of chronic microvascular changes. No discrete mass, mass effect, or CT evidence of acute large vascular territory insult. Gray-white differentiation is preserved.    Midline shift: None.    CSF spaces: Proportional appearance of ventricular and sulcal enlargement. No hydrocephalus. No masses or fluid  collections.    Vasculature: No focally hyperdense artery. Scattered carotid siphon calcifications are present. No abnormal densities within the dural sinuses.    Other findings: No abnormalities of the scalp or subjacent osseous structures. Mastoids are well aerated. No focal abnormality of the sella. The included facial structures are unremarkable.    IMPRESSION  1. No acute intracranial abnormality.  2. Age-related atrophy and chronic sequela of white matter microvascular disease.  3. Additional chronic secondary details discussed above.    RADIATION DOSE  Automated tube current modulation, weight-based exposure dosing, and/or iterative reconstruction technique utilized to reach lowest reasonably achievable exposure rate.    DLP: 1243.2 mGy*cm      Electronically signed by: Butch Anthony  Date:    04/17/2023  Time:    08:46                                     X-Ray Shoulder Trauma Right (Final result)  Result time 04/17/23 07:46:07      Final result by Henry Faith MD (04/17/23 07:46:07)                   Impression:      Mild degenerative changes with no acute fracture.      Electronically signed by: Henry Faith  Date:    04/17/2023  Time:    07:46               Narrative:    EXAMINATION:  XR SHOULDER TRAUMA 3 VIEW RIGHT    CLINICAL HISTORY:  Pain, unspecified    TECHNIQUE:  Three or four views of the right shoulder were performed.    COMPARISON:  08/24/2022    FINDINGS:  Degenerative changes with acromial osteophytes.  No displaced fracture.  The glenohumeral articulation is congruent on scapular Y imaging.                                       X-Ray Chest AP Portable (Final result)  Result time 04/17/23 07:26:43      Final result by Henry Faith MD (04/17/23 07:26:43)                   Impression:      No acute cardiopulmonary process.      Electronically signed by: Henry Faith  Date:    04/17/2023  Time:    07:26               Narrative:    EXAMINATION:  XR CHEST AP PORTABLE    CLINICAL  HISTORY:  dizzy;    TECHNIQUE:  Single view of the chest    COMPARISON:  05/29/2019    FINDINGS:  Prominent interstitial markings with no focal opacification.    The cardiomediastinal silhouette is within normal limits.    No acute osseous abnormality.                                       Medications   sodium chloride 0.9% bolus 1,000 mL 1,000 mL (0 mLs Intravenous Stopped 4/17/23 0803)   meclizine tablet 25 mg (25 mg Oral Given 4/17/23 7927)     Medical Decision Making:   Initial Assessment:   63-year-old presents initially with complaints of dizziness.  Patient states she has some vertigo this morning room spinning around she fell down hit her head no loss of consciousness no neck pain said she hit her right shoulder had some right shoulder pain no deformities noted no fevers or chills initially vital signs were stable no neurological deficits negative stroke screen  Differential Diagnosis:   Differential diagnosis would be CVA versus acute labyrinthitis versus benign positional vertigo versus viral syndrome  Clinical Tests:   Lab Tests: Ordered and Reviewed  The following lab test(s) were unremarkable: CBC, BMP, Urinalysis and Troponin  Radiological Study: Ordered and Reviewed  Medical Tests: Ordered and Reviewed  ED Management:  Nephew patient was given EKG was showing no acute changes put on cardiac monitor IV fluids were given Antivert was given patient feels much better dizziness has greatly improved CT scan was done no strokes or signs of subdural hematoma or bleeds EKGs done unremarkable chest x-ray unremarkable           ED Course as of 04/17/23 0906   Mon Apr 17, 2023   0654 WBC: 6.5 [BL]   0654 Hemoglobin: 13.5 [BL]   0654 Hematocrit: 43.6 [BL]   0735 POC Cardiac Troponin I: 0.01 [BL]   0735 Magnesium: 2.00 [BL]   0735 Sodium: 139 [BL]   0735 Potassium: 4.9 [BL]   0735 Chloride: 103 [BL]   0735 CO2: 26 [BL]   0735 Glucose(!): 219 [BL]   0735 Creatinine: 0.85 [BL]   0735 WBC: 6.5 [BL]   0735 Hemoglobin:  13.5 [BL]   0735 Hematocrit: 43.6 [BL]   0846 Glucose, UA(!): >=1000 [BL]   0846 Glucose(!): 219 [BL]   0859 CT scan shows no acute ischemic strokes no acute bleeds [BL]      ED Course User Index  [BL] Brendon Patrick MD                 Clinical Impression:   Final diagnoses:  [R42] Dizziness  [R52] Pain        ED Disposition Condition    Discharge Stable          ED Prescriptions       Medication Sig Dispense Start Date End Date Auth. Provider    meclizine (ANTIVERT) 25 mg tablet Take 1 tablet (25 mg total) by mouth 3 (three) times daily as needed. 20 tablet 4/17/2023 -- Brendon Patrick MD          Follow-up Information       Follow up With Specialties Details Why Contact Info    Issa Li MD Family Medicine In 1 week  [address]  [place] [address]  [phone]               Brendon Patrick MD  04/17/23 0906

## 2023-05-02 ENCOUNTER — ANTI-COAG VISIT (OUTPATIENT)
Dept: CARDIOLOGY | Facility: HOSPITAL | Age: 63
End: 2023-05-02
Attending: INTERNAL MEDICINE
Payer: MEDICARE

## 2023-05-02 DIAGNOSIS — Z86.711 PERSONAL HISTORY OF PE (PULMONARY EMBOLISM): ICD-10-CM

## 2023-05-02 DIAGNOSIS — Z86.718 PERSONAL HISTORY OF VENOUS THROMBOSIS AND EMBOLISM: ICD-10-CM

## 2023-05-02 LAB — INR PPP: 1.8 (ref 2–3)

## 2023-05-02 PROCEDURE — 85610 PROTHROMBIN TIME: CPT

## 2023-05-02 PROCEDURE — 99211 OFF/OP EST MAY X REQ PHY/QHP: CPT

## 2023-05-18 ENCOUNTER — ANTI-COAG VISIT (OUTPATIENT)
Dept: CARDIOLOGY | Facility: HOSPITAL | Age: 63
End: 2023-05-18
Attending: INTERNAL MEDICINE
Payer: MEDICARE

## 2023-05-18 DIAGNOSIS — Z86.718 PERSONAL HISTORY OF VENOUS THROMBOSIS AND EMBOLISM: ICD-10-CM

## 2023-05-18 DIAGNOSIS — Z86.711 PERSONAL HISTORY OF PE (PULMONARY EMBOLISM): ICD-10-CM

## 2023-05-18 LAB — INR PPP: 2.7 (ref 2–3)

## 2023-05-18 PROCEDURE — 85610 PROTHROMBIN TIME: CPT

## 2023-06-16 DIAGNOSIS — Z87.891 PERSONAL HISTORY OF TOBACCO USE, PRESENTING HAZARDS TO HEALTH: Primary | ICD-10-CM

## 2023-06-29 ENCOUNTER — HOSPITAL ENCOUNTER (OUTPATIENT)
Dept: RADIOLOGY | Facility: HOSPITAL | Age: 63
Discharge: HOME | End: 2023-06-29
Attending: FAMILY MEDICINE
Payer: MEDICARE

## 2023-06-29 ENCOUNTER — ANTI-COAG VISIT (OUTPATIENT)
Dept: CARDIOLOGY | Facility: HOSPITAL | Age: 63
End: 2023-06-29
Attending: INTERNAL MEDICINE
Payer: MEDICARE

## 2023-06-29 DIAGNOSIS — M25.552 LEFT HIP PAIN: ICD-10-CM

## 2023-06-29 DIAGNOSIS — M25.551 RIGHT HIP PAIN: ICD-10-CM

## 2023-06-29 DIAGNOSIS — M54.9 BACK PAIN: ICD-10-CM

## 2023-06-29 DIAGNOSIS — M25.552 LEFT HIP PAIN: Primary | ICD-10-CM

## 2023-06-29 LAB — INR PPP: 3 (ref 2–3)

## 2023-06-29 PROCEDURE — 99211 OFF/OP EST MAY X REQ PHY/QHP: CPT

## 2023-06-29 PROCEDURE — 72100 X-RAY EXAM L-S SPINE 2/3 VWS: CPT | Mod: TC

## 2023-06-29 PROCEDURE — 73502 X-RAY EXAM HIP UNI 2-3 VIEWS: CPT | Mod: TC,RT

## 2023-06-29 PROCEDURE — 73502 X-RAY EXAM HIP UNI 2-3 VIEWS: CPT | Mod: TC,LT

## 2023-06-29 PROCEDURE — 85610 PROTHROMBIN TIME: CPT

## 2023-07-27 ENCOUNTER — ANTI-COAG VISIT (OUTPATIENT)
Dept: CARDIOLOGY | Facility: HOSPITAL | Age: 63
End: 2023-07-27
Payer: MEDICARE

## 2023-07-27 LAB — INR PPP: 3.5 (ref 2–3)

## 2023-07-27 PROCEDURE — 85610 PROTHROMBIN TIME: CPT

## 2023-07-27 PROCEDURE — 99211 OFF/OP EST MAY X REQ PHY/QHP: CPT

## 2023-08-22 ENCOUNTER — ANTI-COAG VISIT (OUTPATIENT)
Dept: CARDIOLOGY | Facility: HOSPITAL | Age: 63
End: 2023-08-22
Attending: INTERNAL MEDICINE
Payer: MEDICARE

## 2023-08-22 LAB — INR PPP: 2.6 (ref 2–3)

## 2023-08-22 PROCEDURE — 85610 PROTHROMBIN TIME: CPT

## 2023-08-22 PROCEDURE — 99211 OFF/OP EST MAY X REQ PHY/QHP: CPT

## 2023-09-21 ENCOUNTER — ANTI-COAG VISIT (OUTPATIENT)
Dept: CARDIOLOGY | Facility: HOSPITAL | Age: 63
End: 2023-09-21
Attending: INTERNAL MEDICINE
Payer: MEDICARE

## 2023-09-21 LAB — INR PPP: 2.7 (ref 2–3)

## 2023-09-21 PROCEDURE — 85610 PROTHROMBIN TIME: CPT

## 2023-09-21 PROCEDURE — 99211 OFF/OP EST MAY X REQ PHY/QHP: CPT

## 2023-09-27 ENCOUNTER — LAB VISIT (OUTPATIENT)
Dept: LAB | Facility: HOSPITAL | Age: 63
End: 2023-09-27
Attending: FAMILY MEDICINE
Payer: MEDICARE

## 2023-09-27 DIAGNOSIS — E11.9 DIABETES MELLITUS WITHOUT COMPLICATION: ICD-10-CM

## 2023-09-27 DIAGNOSIS — I10 ESSENTIAL HYPERTENSION, MALIGNANT: ICD-10-CM

## 2023-09-27 DIAGNOSIS — E78.5 HYPERLIPIDEMIA, UNSPECIFIED HYPERLIPIDEMIA TYPE: ICD-10-CM

## 2023-09-27 DIAGNOSIS — K21.9 GASTROESOPHAGEAL REFLUX DISEASE, UNSPECIFIED WHETHER ESOPHAGITIS PRESENT: Primary | ICD-10-CM

## 2023-09-27 LAB
ALBUMIN SERPL-MCNC: 4.3 G/DL (ref 3.4–4.8)
ALBUMIN/GLOB SERPL: 1.3 RATIO (ref 1.1–2)
ALP SERPL-CCNC: 61 UNIT/L (ref 40–150)
ALT SERPL-CCNC: 22 UNIT/L (ref 0–55)
APPEARANCE UR: CLEAR
AST SERPL-CCNC: 21 UNIT/L (ref 5–34)
BACTERIA #/AREA URNS AUTO: NORMAL /HPF
BILIRUB SERPL-MCNC: 0.4 MG/DL
BILIRUB UR QL STRIP.AUTO: NEGATIVE
BUN SERPL-MCNC: 13.1 MG/DL (ref 9.8–20.1)
CALCIUM SERPL-MCNC: 10.1 MG/DL (ref 8.4–10.2)
CHLORIDE SERPL-SCNC: 101 MMOL/L (ref 98–107)
CHOLEST SERPL-MCNC: 132 MG/DL
CHOLEST/HDLC SERPL: 3 {RATIO} (ref 0–5)
CO2 SERPL-SCNC: 26 MMOL/L (ref 23–31)
COLOR UR AUTO: YELLOW
CREAT SERPL-MCNC: 1.14 MG/DL (ref 0.55–1.02)
ERYTHROCYTE [DISTWIDTH] IN BLOOD BY AUTOMATED COUNT: 15.1 % (ref 11.5–17)
EST. AVERAGE GLUCOSE BLD GHB EST-MCNC: 217.3 MG/DL
GFR SERPLBLD CREATININE-BSD FMLA CKD-EPI: 54 MLS/MIN/1.73/M2
GLOBULIN SER-MCNC: 3.2 GM/DL (ref 2.4–3.5)
GLUCOSE SERPL-MCNC: 154 MG/DL (ref 82–115)
GLUCOSE UR QL STRIP.AUTO: >=1000
HBA1C MFR BLD: 9.2 %
HCT VFR BLD AUTO: 45.6 % (ref 37–47)
HDLC SERPL-MCNC: 41 MG/DL (ref 35–60)
HGB BLD-MCNC: 13.9 G/DL (ref 12–16)
KETONES UR QL STRIP.AUTO: NEGATIVE
LDLC SERPL CALC-MCNC: 74 MG/DL (ref 50–140)
LEUKOCYTE ESTERASE UR QL STRIP.AUTO: NEGATIVE
MCH RBC QN AUTO: 27.4 PG (ref 27–31)
MCHC RBC AUTO-ENTMCNC: 30.5 G/DL (ref 33–36)
MCV RBC AUTO: 89.9 FL (ref 80–94)
NITRITE UR QL STRIP.AUTO: NEGATIVE
PH UR STRIP.AUTO: 5 [PH]
PLATELET # BLD AUTO: 238 X10(3)/MCL (ref 130–400)
PMV BLD AUTO: 10.1 FL (ref 7.4–10.4)
POTASSIUM SERPL-SCNC: 3.9 MMOL/L (ref 3.5–5.1)
PROT SERPL-MCNC: 7.5 GM/DL (ref 5.8–7.6)
PROT UR QL STRIP.AUTO: NEGATIVE
RBC # BLD AUTO: 5.07 X10(6)/MCL (ref 4.2–5.4)
RBC #/AREA URNS AUTO: NORMAL /HPF
RBC UR QL AUTO: NEGATIVE
SODIUM SERPL-SCNC: 135 MMOL/L (ref 136–145)
SP GR UR STRIP.AUTO: 1.01 (ref 1–1.03)
SQUAMOUS #/AREA URNS AUTO: NORMAL /HPF
TRIGL SERPL-MCNC: 85 MG/DL (ref 37–140)
TSH SERPL-ACNC: 1 UIU/ML (ref 0.35–4.94)
UROBILINOGEN UR STRIP-ACNC: 0.2
VLDLC SERPL CALC-MCNC: 17 MG/DL
WBC # SPEC AUTO: 7.11 X10(3)/MCL (ref 4.5–11.5)
WBC #/AREA URNS AUTO: NORMAL /HPF

## 2023-09-27 PROCEDURE — 80061 LIPID PANEL: CPT

## 2023-09-27 PROCEDURE — 80053 COMPREHEN METABOLIC PANEL: CPT

## 2023-09-27 PROCEDURE — 85027 COMPLETE CBC AUTOMATED: CPT

## 2023-09-27 PROCEDURE — 83036 HEMOGLOBIN GLYCOSYLATED A1C: CPT

## 2023-09-27 PROCEDURE — 36415 COLL VENOUS BLD VENIPUNCTURE: CPT

## 2023-09-27 PROCEDURE — 81001 URINALYSIS AUTO W/SCOPE: CPT

## 2023-09-27 PROCEDURE — 84443 ASSAY THYROID STIM HORMONE: CPT

## 2023-10-19 ENCOUNTER — ANTI-COAG VISIT (OUTPATIENT)
Dept: CARDIOLOGY | Facility: HOSPITAL | Age: 63
End: 2023-10-19
Attending: INTERNAL MEDICINE
Payer: MEDICARE

## 2023-10-19 LAB — INR PPP: 2.9 (ref 2–3)

## 2023-10-19 PROCEDURE — 99211 OFF/OP EST MAY X REQ PHY/QHP: CPT

## 2023-10-19 PROCEDURE — 85610 PROTHROMBIN TIME: CPT

## 2023-11-16 ENCOUNTER — ANTI-COAG VISIT (OUTPATIENT)
Dept: CARDIOLOGY | Facility: HOSPITAL | Age: 63
End: 2023-11-16
Attending: INTERNAL MEDICINE
Payer: MEDICARE

## 2023-11-16 LAB — INR PPP: 2 (ref 2–3)

## 2023-11-16 PROCEDURE — 99211 OFF/OP EST MAY X REQ PHY/QHP: CPT

## 2023-11-16 PROCEDURE — 85610 PROTHROMBIN TIME: CPT

## 2023-11-21 ENCOUNTER — HOSPITAL ENCOUNTER (EMERGENCY)
Facility: HOSPITAL | Age: 63
Discharge: HOME | End: 2023-11-21
Attending: FAMILY MEDICINE
Payer: MEDICARE

## 2023-11-21 VITALS
OXYGEN SATURATION: 96 % | WEIGHT: 190 LBS | BODY MASS INDEX: 29.76 KG/M2 | SYSTOLIC BLOOD PRESSURE: 113 MMHG | HEART RATE: 99 BPM | RESPIRATION RATE: 20 BRPM | DIASTOLIC BLOOD PRESSURE: 77 MMHG | TEMPERATURE: 100 F

## 2023-11-21 DIAGNOSIS — J10.1 INFLUENZA A: Primary | ICD-10-CM

## 2023-11-21 PROCEDURE — 99284 EMERGENCY DEPT VISIT MOD MDM: CPT

## 2023-11-21 RX ORDER — OSELTAMIVIR PHOSPHATE 75 MG/1
75 CAPSULE ORAL 2 TIMES DAILY
Qty: 10 CAPSULE | Refills: 0 | Status: SHIPPED | OUTPATIENT
Start: 2023-11-21 | End: 2023-11-26

## 2023-11-21 RX ORDER — IBUPROFEN 800 MG/1
800 TABLET ORAL EVERY 6 HOURS PRN
Qty: 20 TABLET | Refills: 0 | Status: SHIPPED | OUTPATIENT
Start: 2023-11-21

## 2023-11-21 NOTE — Clinical Note
"Gayle Rangela" Asif was seen and treated in our emergency department on 11/21/2023.  She may return to work on 11/27/2023.       If you have any questions or concerns, please don't hesitate to call.      Brendon Patrick MD"

## 2023-11-21 NOTE — ED PROVIDER NOTES
Encounter Date: 11/21/2023       History     Chief Complaint   Patient presents with    Cough     Cough since yesterday/ generalyzed aching/diarrhea yesterday/ pno fever or vomiting/ no further diarrhea     63-year-old presents with flu-like symptoms body aches chills cough since yesterday vital signs are stable physical exam is unremarkable lungs are clear no respiratory distress no chest pain are swabs were limited so we are not doing respiratory swabs never went unless clinically they need to be admitted or transferred treat her based on her clinical some patient understands and good with treatment plan she is in agreement        Review of patient's allergies indicates:   Allergen Reactions    Tramadol Itching     Past Medical History:   Diagnosis Date    Claustrophobia     Diabetes mellitus, type 2     DVT (deep venous thrombosis)     High cholesterol     Hypertension     Pulmonary embolism      Past Surgical History:   Procedure Laterality Date    ARTHROSCOPIC REPAIR OF ROTATOR CUFF OF SHOULDER Right 10/17/2022    Procedure: REPAIR, ROTATOR CUFF, ARTHROSCOPIC;  Surgeon: Freddie Jay MD;  Location: [facility];  Service: Orthopedics;  Laterality: Right;    HYSTERECTOMY       Family History   Problem Relation Age of Onset    Diabetes Mother     Stroke Father     Heart disease Sister     Clotting disorder Sister      Social History     Tobacco Use    Smoking status: Every Day     Current packs/day: 0.50     Average packs/day: 0.5 packs/day for 32.0 years (16.0 ttl pk-yrs)     Types: Cigarettes    Smokeless tobacco: Never   Substance Use Topics    Alcohol use: Never    Drug use: Never     Review of Systems   Constitutional:  Positive for chills, fatigue and fever.   HENT:  Negative for sore throat.    Respiratory:  Negative for shortness of breath.    Cardiovascular:  Negative for chest pain.   Gastrointestinal:  Negative for nausea.   Genitourinary:  Negative for dysuria.   Musculoskeletal:  Positive for myalgias.  Negative for back pain.   Skin:  Negative for rash.   Neurological:  Negative for weakness.   Hematological:  Does not bruise/bleed easily.   All other systems reviewed and are negative.      Physical Exam     Initial Vitals   BP Pulse Resp Temp SpO2   -- -- -- -- --      MAP       --         Physical Exam    Nursing note and vitals reviewed.  Constitutional: She appears well-developed and well-nourished. She is active.   HENT:   Head: Normocephalic and atraumatic.   Nose: Nose normal.   Mouth/Throat: Oropharynx is clear and moist.   Eyes: Conjunctivae, EOM and lids are normal. Pupils are equal, round, and reactive to light.   Neck: Trachea normal and phonation normal. Neck supple. No thyroid mass present.   Normal range of motion.  Cardiovascular:  Normal rate, regular rhythm, normal heart sounds and normal pulses.           Pulmonary/Chest: Breath sounds normal.   Abdominal: Abdomen is soft. Bowel sounds are normal.   Musculoskeletal:         General: Normal range of motion.      Cervical back: Normal range of motion and neck supple.     Neurological: She is alert and oriented to person, place, and time. She has normal strength and normal reflexes.   Skin: Skin is warm and intact.   Psychiatric: She has a normal mood and affect. Her speech is normal and behavior is normal. Judgment and thought content normal. Cognition and memory are normal.         ED Course   Procedures  Labs Reviewed - No data to display       Imaging Results    None          Medications - No data to display  Medical Decision Making  63-year-old presents with flu-like symptoms body aches chills cough since yesterday vital signs are stable physical exam is unremarkable lungs are clear no respiratory distress no chest pain are swabs were limited so we are not doing respiratory swabs never went unless clinically they need to be admitted or transferred treat her based on her clinical some patient understands and good with treatment plan she is in  agreement          Risk  Prescription drug management.  Risk Details: Differential diagnosis COVID flu RSV pneumonia                                   Clinical Impression:  Final diagnoses:  [J10.1] Influenza A (Primary)          ED Disposition Condition    Discharge Stable          ED Prescriptions       Medication Sig Dispense Start Date End Date Auth. Provider    oseltamivir (TAMIFLU) 75 MG capsule Take 1 capsule (75 mg total) by mouth 2 (two) times daily. for 5 days 10 capsule 11/21/2023 11/26/2023 Brendon Patrick MD    ibuprofen (ADVIL,MOTRIN) 800 MG tablet Take 1 tablet (800 mg total) by mouth every 6 (six) hours as needed for Pain. 20 tablet 11/21/2023 -- Brendon Patrick MD          Follow-up Information       Follow up With Specialties Details Why Contact Info    Issa Li MD Family Medicine  As needed [address] [place] [id number]  [phone]               Brendon Patrick MD  11/21/23 1237

## 2023-12-14 ENCOUNTER — ANTI-COAG VISIT (OUTPATIENT)
Dept: CARDIOLOGY | Facility: HOSPITAL | Age: 63
End: 2023-12-14
Attending: INTERNAL MEDICINE
Payer: MEDICARE

## 2023-12-14 LAB — INR PPP: 2.9 (ref 2–3)

## 2023-12-14 PROCEDURE — 99211 OFF/OP EST MAY X REQ PHY/QHP: CPT

## 2023-12-14 PROCEDURE — 85610 PROTHROMBIN TIME: CPT

## 2024-01-18 ENCOUNTER — ANTI-COAG VISIT (OUTPATIENT)
Dept: CARDIOLOGY | Facility: HOSPITAL | Age: 64
End: 2024-01-18
Attending: INTERNAL MEDICINE
Payer: MEDICARE

## 2024-01-18 LAB — INR PPP: 2.5 (ref 2–3)

## 2024-01-18 PROCEDURE — 99211 OFF/OP EST MAY X REQ PHY/QHP: CPT

## 2024-01-18 PROCEDURE — 85610 PROTHROMBIN TIME: CPT

## 2024-02-22 ENCOUNTER — ANTI-COAG VISIT (OUTPATIENT)
Dept: CARDIOLOGY | Facility: HOSPITAL | Age: 64
End: 2024-02-22
Attending: INTERNAL MEDICINE
Payer: MEDICARE

## 2024-02-22 LAB — INR PPP: 3 (ref 2–3)

## 2024-02-22 PROCEDURE — 99211 OFF/OP EST MAY X REQ PHY/QHP: CPT

## 2024-02-22 PROCEDURE — 85610 PROTHROMBIN TIME: CPT

## 2024-03-14 ENCOUNTER — HOSPITAL ENCOUNTER (EMERGENCY)
Facility: HOSPITAL | Age: 64
Discharge: HOME | End: 2024-03-14
Attending: FAMILY MEDICINE
Payer: MEDICARE

## 2024-03-14 VITALS
OXYGEN SATURATION: 96 % | HEIGHT: 67 IN | RESPIRATION RATE: 18 BRPM | SYSTOLIC BLOOD PRESSURE: 148 MMHG | WEIGHT: 214 LBS | DIASTOLIC BLOOD PRESSURE: 90 MMHG | TEMPERATURE: 98 F | HEART RATE: 78 BPM | BODY MASS INDEX: 33.59 KG/M2

## 2024-03-14 DIAGNOSIS — S61.218A LACERATION OF INDEX FINGER WITHOUT FOREIGN BODY WITHOUT DAMAGE TO NAIL, UNSPECIFIED LATERALITY, INITIAL ENCOUNTER: Primary | ICD-10-CM

## 2024-03-14 PROCEDURE — 90715 TDAP VACCINE 7 YRS/> IM: CPT | Performed by: FAMILY MEDICINE

## 2024-03-14 PROCEDURE — 90471 IMMUNIZATION ADMIN: CPT | Performed by: FAMILY MEDICINE

## 2024-03-14 PROCEDURE — 99284 EMERGENCY DEPT VISIT MOD MDM: CPT | Mod: 25

## 2024-03-14 PROCEDURE — 63600175 PHARM REV CODE 636 W HCPCS: Performed by: FAMILY MEDICINE

## 2024-03-14 RX ORDER — LIDOCAINE HYDROCHLORIDE 10 MG/ML
5 INJECTION INFILTRATION; PERINEURAL ONCE
Status: DISCONTINUED | OUTPATIENT
Start: 2024-03-14 | End: 2024-03-14

## 2024-03-14 RX ADMIN — TETANUS TOXOID, REDUCED DIPHTHERIA TOXOID AND ACELLULAR PERTUSSIS VACCINE, ADSORBED 0.5 ML: 5; 2.5; 8; 8; 2.5 SUSPENSION INTRAMUSCULAR at 07:03

## 2024-03-14 NOTE — ED PROVIDER NOTES
Encounter Date: 3/14/2024       History     Chief Complaint   Patient presents with    Laceration     Laceration to right hand, 3rd digit, on blood thinners, occurred 20 min PTA. Needs Tdap     Laceration   64-year-old with a very small less than 0.5 cm laceration on 3rd digit on the posterior aspect of the finger patient otherwise states that it happened just prior to arrival patient has chronic history of diabetes that could possibly contributing to poor healing patient otherwise is her own history source        Review of patient's allergies indicates:   Allergen Reactions    Tramadol Itching     Past Medical History:   Diagnosis Date    Claustrophobia     Diabetes mellitus, type 2     DVT (deep venous thrombosis)     High cholesterol     Hypertension     Pulmonary embolism      Past Surgical History:   Procedure Laterality Date    ARTHROSCOPIC REPAIR OF ROTATOR CUFF OF SHOULDER Right 10/17/2022    Procedure: REPAIR, ROTATOR CUFF, ARTHROSCOPIC;  Surgeon: Freddie Jay MD;  Location: [facility];  Service: Orthopedics;  Laterality: Right;    HYSTERECTOMY       Family History   Problem Relation Age of Onset    Diabetes Mother     Stroke Father     Heart disease Sister     Clotting disorder Sister      Social History     Tobacco Use    Smoking status: Every Day     Current packs/day: 0.50     Average packs/day: 0.5 packs/day for 32.0 years (16.0 ttl pk-yrs)     Types: Cigarettes    Smokeless tobacco: Never   Substance Use Topics    Alcohol use: Never    Drug use: Never     Review of Systems   Constitutional:  Negative for fever.   HENT:  Negative for sore throat.    Respiratory:  Negative for shortness of breath.    Cardiovascular:  Negative for chest pain.   Gastrointestinal:  Negative for nausea.   Genitourinary:  Negative for dysuria.   Musculoskeletal:  Negative for back pain.   Skin:  Positive for wound. Negative for rash.   Neurological:  Negative for weakness.   Hematological:  Does not bruise/bleed easily.    All other systems reviewed and are negative.      Physical Exam     Initial Vitals [03/14/24 0737]   BP Pulse Resp Temp SpO2   (!) 148/90 78 18 98.1 °F (36.7 °C) 96 %      MAP       --         Physical Exam    Nursing note and vitals reviewed.  Constitutional: She appears well-developed.   HENT:   Head: Normocephalic and atraumatic.   Right Ear: External ear normal.   Left Ear: External ear normal.   Nose: Nose normal.   Mouth/Throat: Oropharynx is clear and moist. No oropharyngeal exudate.   Eyes: Conjunctivae and EOM are normal. Pupils are equal, round, and reactive to light. Right eye exhibits no discharge. Left eye exhibits no discharge.   Neck: Neck supple. No tracheal deviation present. No JVD present.   Normal range of motion.  Cardiovascular:  Normal rate, regular rhythm, normal heart sounds and intact distal pulses.     Exam reveals no gallop and no friction rub.       No murmur heard.  Pulmonary/Chest: Breath sounds normal. No stridor. No respiratory distress. She has no wheezes. She has no rhonchi. She has no rales.   Abdominal: Abdomen is soft. Bowel sounds are normal. She exhibits no distension and no mass. There is no abdominal tenderness. There is no rebound and no guarding.   Musculoskeletal:         General: Normal range of motion.      Cervical back: Normal range of motion and neck supple.     Neurological: She is alert and oriented to person, place, and time. She has normal strength. No cranial nerve deficit.   Skin: Skin is warm and dry. No rash and no abscess noted. No erythema.   Psychiatric: She has a normal mood and affect. Her behavior is normal. Judgment and thought content normal.         ED Course   Procedures  Labs Reviewed - No data to display       Imaging Results    None          Medications   Tdap (BOOSTRIX) vaccine injection 0.5 mL (0.5 mLs Intramuscular Given 3/14/24 0995)     Medical Decision Making  Injury laceration crush injury cellulitis after talking to the patient appears  to be a laceration from packing knife    Risk  Prescription drug management.                                      Clinical Impression:  Final diagnoses:  [Z09.277A] Laceration of index finger without foreign body without damage to nail, unspecified laterality, initial encounter (Primary)          ED Disposition Condition    Discharge Stable          ED Prescriptions    None       Follow-up Information       Follow up With Specialties Details Why Contact Info    Issa Li MD Family Medicine   [address]  [place] [address]  [phone]               Adam Santos MD  03/14/24 0801

## 2024-03-21 ENCOUNTER — ANTI-COAG VISIT (OUTPATIENT)
Dept: CARDIOLOGY | Facility: HOSPITAL | Age: 64
End: 2024-03-21
Attending: INTERNAL MEDICINE
Payer: MEDICARE

## 2024-03-21 LAB — INR PPP: 2.5 (ref 2–3)

## 2024-03-21 PROCEDURE — 85610 PROTHROMBIN TIME: CPT

## 2024-03-21 PROCEDURE — 99211 OFF/OP EST MAY X REQ PHY/QHP: CPT

## 2024-04-25 ENCOUNTER — HOSPITAL ENCOUNTER (EMERGENCY)
Facility: HOSPITAL | Age: 64
Discharge: HOME | End: 2024-04-25
Attending: EMERGENCY MEDICINE
Payer: MEDICARE

## 2024-04-25 VITALS
RESPIRATION RATE: 18 BRPM | SYSTOLIC BLOOD PRESSURE: 147 MMHG | BODY MASS INDEX: 33.74 KG/M2 | DIASTOLIC BLOOD PRESSURE: 79 MMHG | WEIGHT: 215 LBS | HEART RATE: 92 BPM | OXYGEN SATURATION: 99 % | TEMPERATURE: 99 F | HEIGHT: 67 IN

## 2024-04-25 DIAGNOSIS — J06.9 VIRAL URI WITH COUGH: Primary | ICD-10-CM

## 2024-04-25 LAB
FLUAV AG UPPER RESP QL IA.RAPID: NOT DETECTED
FLUBV AG UPPER RESP QL IA.RAPID: NOT DETECTED
SARS-COV-2 RNA RESP QL NAA+PROBE: NOT DETECTED
STREP A PCR (OHS): NOT DETECTED

## 2024-04-25 PROCEDURE — 99282 EMERGENCY DEPT VISIT SF MDM: CPT

## 2024-04-25 PROCEDURE — 87651 STREP A DNA AMP PROBE: CPT | Performed by: EMERGENCY MEDICINE

## 2024-04-25 PROCEDURE — 0240U COVID/FLU A&B PCR: CPT | Performed by: EMERGENCY MEDICINE

## 2024-04-25 NOTE — ED PROVIDER NOTES
Encounter Date: 4/25/2024       History     Chief Complaint   Patient presents with    Cough     Pt presents with c/o cough, sore throat, and body aches since Sunday; reports feeling worse this AM; no fevers at home      This 64-year-old female presents to the emergency room with complaints of nasal congestion runny nose sore throat and cough that started on Sunday this well as body aches.  She denies fever.  She reports she recently had the flu.       Review of patient's allergies indicates:   Allergen Reactions    Tramadol Itching     Past Medical History:   Diagnosis Date    Claustrophobia     Diabetes mellitus, type 2     DVT (deep venous thrombosis)     High cholesterol     Hypertension     Pulmonary embolism      Past Surgical History:   Procedure Laterality Date    ARTHROSCOPIC REPAIR OF ROTATOR CUFF OF SHOULDER Right 10/17/2022    Procedure: REPAIR, ROTATOR CUFF, ARTHROSCOPIC;  Surgeon: Freddie Jay MD;  Location: [facility];  Service: Orthopedics;  Laterality: Right;    HYSTERECTOMY       Family History   Problem Relation Name Age of Onset    Diabetes Mother      Stroke Father      Heart disease Sister      Clotting disorder Sister       Social History     Tobacco Use    Smoking status: Every Day     Current packs/day: 0.50     Average packs/day: 0.5 packs/day for 32.0 years (16.0 ttl pk-yrs)     Types: Cigarettes    Smokeless tobacco: Never   Substance Use Topics    Alcohol use: Never    Drug use: Never     Review of Systems   Constitutional:  Negative for fever.   HENT:  Positive for congestion and sore throat.    Respiratory:  Positive for cough. Negative for shortness of breath.    Cardiovascular:  Negative for chest pain.   Gastrointestinal:  Negative for nausea.   Genitourinary:  Negative for dysuria.   Musculoskeletal:  Positive for arthralgias. Negative for back pain.   Skin:  Negative for rash.   Neurological:  Negative for weakness.   Hematological:  Does not bruise/bleed easily.       Physical  Exam     Initial Vitals [04/25/24 0843]   BP Pulse Resp Temp SpO2   (!) 147/79 92 18 98.6 °F (37 °C) 99 %      MAP       --         Physical Exam    Nursing note and vitals reviewed.  Constitutional: She appears well-developed and well-nourished.   HENT:   Head: Normocephalic and atraumatic.   Eyes: Conjunctivae and EOM are normal. Pupils are equal, round, and reactive to light.   Neck: Neck supple.   Normal range of motion.  Cardiovascular:  Normal rate, regular rhythm, normal heart sounds and intact distal pulses.           Pulmonary/Chest: Breath sounds normal.   Abdominal: Abdomen is soft. Bowel sounds are normal.   Musculoskeletal:         General: Normal range of motion.      Cervical back: Normal range of motion and neck supple.     Neurological: She is alert and oriented to person, place, and time. She has normal strength.   Skin: Skin is warm and dry. Capillary refill takes less than 2 seconds.   Psychiatric: She has a normal mood and affect. Her behavior is normal. Judgment and thought content normal.         ED Course   Procedures  Labs Reviewed   COVID/FLU A&B PCR - Normal    Narrative:     The Xpert Xpress SARS-CoV-2/FLU/RSV plus is a rapid, multiplexed real-time PCR test intended for the simultaneous qualitative detection and differentiation of SARS-CoV-2, Influenza A, Influenza B, and respiratory syncytial virus (RSV) viral RNA in either nasopharyngeal swab or nasal swab specimens.         STREP GROUP A BY PCR - Normal    Narrative:     The Xpert Xpress Strep A test is a rapid, qualitative in vitro diagnostic test for the detection of Streptococcus pyogenes (Group A ß-hemolytic Streptococcus, Strep A) in throat swab specimens from patients with signs and symptoms of pharyngitis.            Imaging Results    None          Medications - No data to display  Medical Decision Making  Amount and/or Complexity of Data Reviewed  Labs: ordered.                                      Clinical Impression:  Final  diagnoses:  [J06.9] Viral URI with cough (Primary)          ED Disposition Condition    Discharge Stable          ED Prescriptions    None       Follow-up Information       Follow up With Specialties Details Why Contact Info    Issa Li MD Family Medicine  As needed, If symptoms worsen [address] [place], [place] [address]K[place]ie  [facility] [address]  [phone]               Warren Aguillon MD  04/25/24 0932       Warren Aguillon MD  04/25/24 0996

## 2024-04-25 NOTE — Clinical Note
"Gayle Rangela" Asif was seen and treated in our emergency department on 4/25/2024.  She may return to work on 04/27/2024.       If you have any questions or concerns, please don't hesitate to call.      Warren Aguillon MD"

## 2024-05-14 ENCOUNTER — ANTI-COAG VISIT (OUTPATIENT)
Dept: CARDIOLOGY | Facility: HOSPITAL | Age: 64
End: 2024-05-14
Attending: INTERNAL MEDICINE
Payer: MEDICARE

## 2024-05-14 LAB — INR PPP: 2 (ref 2–3)

## 2024-05-14 PROCEDURE — 99211 OFF/OP EST MAY X REQ PHY/QHP: CPT

## 2024-05-14 PROCEDURE — 85610 PROTHROMBIN TIME: CPT

## 2024-05-20 ENCOUNTER — HOSPITAL ENCOUNTER (EMERGENCY)
Facility: HOSPITAL | Age: 64
Discharge: HOME | End: 2024-05-20
Attending: FAMILY MEDICINE
Payer: MEDICARE

## 2024-05-20 VITALS
TEMPERATURE: 98 F | DIASTOLIC BLOOD PRESSURE: 90 MMHG | HEIGHT: 67 IN | RESPIRATION RATE: 18 BRPM | BODY MASS INDEX: 32.96 KG/M2 | SYSTOLIC BLOOD PRESSURE: 165 MMHG | OXYGEN SATURATION: 100 % | HEART RATE: 84 BPM | WEIGHT: 210 LBS

## 2024-05-20 DIAGNOSIS — B96.89 ACUTE BACTERIAL SINUSITIS: Primary | ICD-10-CM

## 2024-05-20 DIAGNOSIS — J01.90 ACUTE BACTERIAL SINUSITIS: Primary | ICD-10-CM

## 2024-05-20 PROCEDURE — 96372 THER/PROPH/DIAG INJ SC/IM: CPT | Performed by: FAMILY MEDICINE

## 2024-05-20 PROCEDURE — 99284 EMERGENCY DEPT VISIT MOD MDM: CPT | Mod: 25

## 2024-05-20 PROCEDURE — 63600175 PHARM REV CODE 636 W HCPCS: Performed by: FAMILY MEDICINE

## 2024-05-20 RX ORDER — DEXAMETHASONE SODIUM PHOSPHATE 4 MG/ML
4 INJECTION, SOLUTION INTRA-ARTICULAR; INTRALESIONAL; INTRAMUSCULAR; INTRAVENOUS; SOFT TISSUE
Status: COMPLETED | OUTPATIENT
Start: 2024-05-20 | End: 2024-05-20

## 2024-05-20 RX ORDER — LEVOCETIRIZINE DIHYDROCHLORIDE 5 MG/1
5 TABLET, FILM COATED ORAL NIGHTLY
Qty: 30 TABLET | Refills: 11 | Status: SHIPPED | OUTPATIENT
Start: 2024-05-20 | End: 2025-05-20

## 2024-05-20 RX ORDER — BENZONATATE 100 MG/1
100 CAPSULE ORAL 3 TIMES DAILY PRN
Qty: 20 CAPSULE | Refills: 0 | Status: SHIPPED | OUTPATIENT
Start: 2024-05-20 | End: 2024-05-30

## 2024-05-20 RX ORDER — AMOXICILLIN AND CLAVULANATE POTASSIUM 875; 125 MG/1; MG/1
1 TABLET, FILM COATED ORAL 2 TIMES DAILY
Qty: 14 TABLET | Refills: 0 | Status: SHIPPED | OUTPATIENT
Start: 2024-05-20

## 2024-05-20 RX ADMIN — DEXAMETHASONE SODIUM PHOSPHATE 4 MG: 4 INJECTION, SOLUTION INTRA-ARTICULAR; INTRALESIONAL; INTRAMUSCULAR; INTRAVENOUS; SOFT TISSUE at 07:05

## 2024-05-20 NOTE — ED PROVIDER NOTES
Encounter Date: 5/20/2024       History     Chief Complaint   Patient presents with    Cough     Sneezing, coughing and congestion. Same as last month.      64-year-old presents cough sneezing congestion started last night no fevers no chills lot of sinus pain and pressure vital signs were stable on exam she has some sinus tenderness throat clear lung has some rhonchi no signs of distress pressure no bacterial sinusitis most likely mixed with some allergies we will give a shot of cortisone sent home on some antibiotics prednisone Tessalon Perles follow up as needed take off work today and tomorrow rest hydrate follow up with PCP if not any better        Review of patient's allergies indicates:   Allergen Reactions    Tramadol Itching     Past Medical History:   Diagnosis Date    Claustrophobia     Diabetes mellitus, type 2     DVT (deep venous thrombosis)     High cholesterol     Hypertension     Pulmonary embolism      Past Surgical History:   Procedure Laterality Date    ARTHROSCOPIC REPAIR OF ROTATOR CUFF OF SHOULDER Right 10/17/2022    Procedure: REPAIR, ROTATOR CUFF, ARTHROSCOPIC;  Surgeon: Freddie Jay MD;  Location: [facility] OR;  Service: Orthopedics;  Laterality: Right;    HYSTERECTOMY       Family History   Problem Relation Name Age of Onset    Diabetes Mother      Stroke Father      Heart disease Sister      Clotting disorder Sister       Social History     Tobacco Use    Smoking status: Every Day     Current packs/day: 0.50     Average packs/day: 0.5 packs/day for 32.0 years (16.0 ttl pk-yrs)     Types: Cigarettes    Smokeless tobacco: Never   Substance Use Topics    Alcohol use: Never    Drug use: Never     Review of Systems   Constitutional:  Negative for fever.   HENT:  Positive for congestion, sinus pressure and sinus pain. Negative for sore throat.    Respiratory:  Positive for cough. Negative for shortness of breath.    Cardiovascular:  Negative for chest pain.   Gastrointestinal:  Negative for  nausea.   Genitourinary:  Negative for dysuria.   Musculoskeletal:  Negative for back pain.   Skin:  Negative for rash.   Neurological:  Negative for weakness.   Hematological:  Does not bruise/bleed easily.   All other systems reviewed and are negative.      Physical Exam     Initial Vitals [05/20/24 0644]   BP Pulse Resp Temp SpO2   (!) 165/90 84 18 97.6 °F (36.4 °C) 100 %      MAP       --         Physical Exam    Nursing note and vitals reviewed.  Constitutional: She appears well-developed and well-nourished. She is active.   HENT:   Head: Normocephalic and atraumatic.   Mouth/Throat: Oropharynx is clear and moist.   Sinus tenderness swollen turbinates   Eyes: Conjunctivae, EOM and lids are normal. Pupils are equal, round, and reactive to light.   Neck: Trachea normal and phonation normal. Neck supple. No thyroid mass present.   Normal range of motion.  Cardiovascular:  Normal rate, regular rhythm, normal heart sounds and normal pulses.           Pulmonary/Chest: Breath sounds normal.   Abdominal: Abdomen is soft. Bowel sounds are normal.   Musculoskeletal:         General: Normal range of motion.      Cervical back: Normal range of motion and neck supple.     Neurological: She is alert and oriented to person, place, and time. She has normal strength and normal reflexes. GCS score is 15. GCS eye subscore is 4. GCS verbal subscore is 5. GCS motor subscore is 6.   Skin: Skin is warm and intact.   Psychiatric: She has a normal mood and affect. Her speech is normal and behavior is normal. Judgment and thought content normal. Cognition and memory are normal.         ED Course   Procedures  Labs Reviewed - No data to display       Imaging Results    None          Medications   dexAMETHasone injection 4 mg (has no administration in time range)     Medical Decision Making  64-year-old presents cough sneezing congestion started last night no fevers no chills lot of sinus pain and pressure vital signs were stable on exam  she has some sinus tenderness throat clear lung has some rhonchi no signs of distress pressure no bacterial sinusitis most likely mixed with some allergies we will give a shot of cortisone sent home on some antibiotics prednisone Tessalon Perles follow up as needed take off work today and tomorrow rest hydrate follow up with PCP if not any better          Risk  Prescription drug management.  Risk Details: Differential diagnosis allergies viral syndrome COVID flu RSV bacterial sinusitis                                      Clinical Impression:  Final diagnoses:  [J01.90, B96.89] Acute bacterial sinusitis (Primary)          ED Disposition Condition    Discharge Stable          ED Prescriptions       Medication Sig Dispense Start Date End Date Auth. Provider    amoxicillin-clavulanate 875-125mg (AUGMENTIN) 875-125 mg per tablet Take 1 tablet by mouth 2 (two) times daily. 14 tablet 5/20/2024 -- Brendon Patrick MD    benzonatate (TESSALON) 100 MG capsule Take 1 capsule (100 mg total) by mouth 3 (three) times daily as needed for Cough. 20 capsule 5/20/2024 5/30/2024 Brendon Patrick MD    levocetirizine (XYZAL) 5 MG tablet Take 1 tablet (5 mg total) by mouth every evening. 30 tablet 5/20/2024 5/20/2025 Brendon Patrick MD          Follow-up Information       Follow up With Specialties Details Why Contact Info    Issa Li MD Family Medicine  As needed [address]  [place] [address]  [phone]               Brendon Patrick MD  05/20/24 0768

## 2024-05-20 NOTE — Clinical Note
"Gayle Rangela" Asif was seen and treated in our emergency department on 5/20/2024.  She may return to work on 05/22/2024.       If you have any questions or concerns, please don't hesitate to call.      Brendon Patrick MD"

## 2024-05-24 NOTE — PROGRESS NOTES
Subjective:    CC: Follow-up of the Right Shoulder and Follow-up (R shoulder SAD, miniopen RTC 10/17/22 pt states no pain except when sweeping . Pt attends PT 3 days a week pt takes tylenol PRN.)       HPI:  Patient returns to clinic for first post op visit. Status post right rotator cuff repair. Approximately 6 weeks out. The patients pain is well managed on OTC Tylenol; no longer taking narcotics. The patient states no signs of infection. Patient presents today wearing simple sling.  Attending formal physical therapy. No new complaints.    ROS: Refer to HPI for pertinent ROS. All other 12 point systems negative.    Objective:    Vitals:    11/28/22 1344   BP: 124/78   Pulse: 93   Temp: 98.5 °F (36.9 °C)        Physical Exam:  Right upper extremity compartments are soft and warm. There are no signs or symptoms of DVT or infection. Incision sites are well healed, clean, and dry.  Nontender to palpation about the shoulder.  Passive range of motion shows that the patient has 130 degrees of abduction and forward flexion; 90 active ROM. Neurovascularly intact distally.    Images:  Previous Images Reviewed and discussed with patient.    Assessment:  1. Traumatic complete tear of right rotator cuff, subsequent encounter       Plan:  Physical exam and intraoperative findings discussed with the patient. Continue formal physical therapy to regain strength and range of motion; stressed the importance.  Discontinue sling.  Okay for gradual strengthening once range of motion improved  The Patient was instructed to take pain medication as needed with appropriate precautions and to refrain from heavy lifting.  We have discussed her work duties; she continues to feel unsafe to return to work at this time.  She will remain off duty.  I would like to see the patient back in 6 weeks to assess the patients progress.    Follow up: Follow up in about 6 weeks (around 1/9/2023).             Yes

## 2024-06-10 DIAGNOSIS — Z78.0 MENOPAUSE PRESENT: Primary | ICD-10-CM

## 2024-06-10 DIAGNOSIS — Z78.0 POSTMENOPAUSAL STATE: Primary | ICD-10-CM

## 2024-06-11 ENCOUNTER — ANTI-COAG VISIT (OUTPATIENT)
Dept: CARDIOLOGY | Facility: HOSPITAL | Age: 64
End: 2024-06-11
Attending: INTERNAL MEDICINE
Payer: MEDICARE

## 2024-06-11 LAB — INR PPP: 1.9 (ref 2–3)

## 2024-06-11 PROCEDURE — 99211 OFF/OP EST MAY X REQ PHY/QHP: CPT

## 2024-06-11 PROCEDURE — 85610 PROTHROMBIN TIME: CPT

## 2024-06-13 ENCOUNTER — HOSPITAL ENCOUNTER (OUTPATIENT)
Dept: RADIOLOGY | Facility: HOSPITAL | Age: 64
Discharge: HOME | End: 2024-06-13
Attending: NURSE PRACTITIONER
Payer: MEDICARE

## 2024-06-13 DIAGNOSIS — Z78.0 POSTMENOPAUSAL STATE: ICD-10-CM

## 2024-06-13 PROCEDURE — 77080 DXA BONE DENSITY AXIAL: CPT | Mod: TC

## 2024-07-11 ENCOUNTER — ANTI-COAG VISIT (OUTPATIENT)
Dept: CARDIOLOGY | Facility: HOSPITAL | Age: 64
End: 2024-07-11
Attending: INTERNAL MEDICINE
Payer: MEDICARE

## 2024-07-11 LAB — INR PPP: 4.2 (ref 2–3)

## 2024-07-11 PROCEDURE — 85610 PROTHROMBIN TIME: CPT

## 2024-07-11 PROCEDURE — 99211 OFF/OP EST MAY X REQ PHY/QHP: CPT

## 2024-08-01 ENCOUNTER — ANTI-COAG VISIT (OUTPATIENT)
Dept: CARDIOLOGY | Facility: HOSPITAL | Age: 64
End: 2024-08-01
Attending: INTERNAL MEDICINE
Payer: MEDICARE

## 2024-08-01 LAB — INR PPP: 3.8 (ref 2–3)

## 2024-08-01 PROCEDURE — 99211 OFF/OP EST MAY X REQ PHY/QHP: CPT

## 2024-08-01 PROCEDURE — 85610 PROTHROMBIN TIME: CPT

## 2024-08-27 ENCOUNTER — ANTI-COAG VISIT (OUTPATIENT)
Dept: CARDIOLOGY | Facility: HOSPITAL | Age: 64
End: 2024-08-27
Attending: INTERNAL MEDICINE
Payer: MEDICARE

## 2024-08-27 LAB — INR PPP: 4.1 (ref 2–3)

## 2024-08-27 PROCEDURE — 85610 PROTHROMBIN TIME: CPT

## 2024-09-06 DIAGNOSIS — Z12.31 OTHER SCREENING MAMMOGRAM: Primary | ICD-10-CM

## 2024-09-12 ENCOUNTER — LAB VISIT (OUTPATIENT)
Dept: LAB | Facility: HOSPITAL | Age: 64
End: 2024-09-12
Attending: NURSE PRACTITIONER
Payer: MEDICARE

## 2024-09-12 DIAGNOSIS — I10 ESSENTIAL HYPERTENSION, MALIGNANT: Primary | ICD-10-CM

## 2024-09-12 DIAGNOSIS — E78.5 HYPERLIPIDEMIA, UNSPECIFIED HYPERLIPIDEMIA TYPE: ICD-10-CM

## 2024-09-12 LAB
ALBUMIN SERPL-MCNC: 4.1 G/DL (ref 3.4–4.8)
ALP SERPL-CCNC: 83 UNIT/L (ref 40–150)
ALT SERPL-CCNC: 22 UNIT/L (ref 0–55)
AST SERPL-CCNC: 21 UNIT/L (ref 5–34)
BILIRUB DIRECT SERPL-MCNC: 0.2 MG/DL (ref 0–?)
BILIRUB SERPL-MCNC: 0.4 MG/DL
BILIRUBIN DIRECT+TOT PNL SERPL-MCNC: 0.2 MG/DL (ref 0–0.8)
CHOLEST SERPL-MCNC: 122 MG/DL
CHOLEST/HDLC SERPL: 3 {RATIO} (ref 0–5)
HDLC SERPL-MCNC: 45 MG/DL (ref 35–60)
LDLC SERPL CALC-MCNC: 65 MG/DL (ref 50–140)
PROT SERPL-MCNC: 6.9 GM/DL (ref 5.8–7.6)
TRIGL SERPL-MCNC: 61 MG/DL (ref 37–140)
VLDLC SERPL CALC-MCNC: 12 MG/DL

## 2024-09-12 PROCEDURE — 80076 HEPATIC FUNCTION PANEL: CPT

## 2024-09-12 PROCEDURE — 36415 COLL VENOUS BLD VENIPUNCTURE: CPT

## 2024-09-12 PROCEDURE — 80061 LIPID PANEL: CPT

## 2024-09-17 ENCOUNTER — ANTI-COAG VISIT (OUTPATIENT)
Dept: CARDIOLOGY | Facility: HOSPITAL | Age: 64
End: 2024-09-17
Attending: INTERNAL MEDICINE
Payer: MEDICARE

## 2024-09-17 LAB — INR PPP: 3 (ref 2–3)

## 2024-09-17 PROCEDURE — 85610 PROTHROMBIN TIME: CPT

## 2024-09-17 PROCEDURE — 99211 OFF/OP EST MAY X REQ PHY/QHP: CPT

## 2024-09-19 ENCOUNTER — HOSPITAL ENCOUNTER (OUTPATIENT)
Dept: RADIOLOGY | Facility: HOSPITAL | Age: 64
Discharge: HOME | End: 2024-09-19
Attending: FAMILY MEDICINE
Payer: MEDICARE

## 2024-09-19 DIAGNOSIS — Z12.31 OTHER SCREENING MAMMOGRAM: ICD-10-CM

## 2024-09-19 PROCEDURE — 77063 BREAST TOMOSYNTHESIS BI: CPT | Mod: 26,,, | Performed by: RADIOLOGY

## 2024-09-19 PROCEDURE — 77067 SCR MAMMO BI INCL CAD: CPT | Mod: 26,,, | Performed by: RADIOLOGY

## 2024-09-19 PROCEDURE — 77067 SCR MAMMO BI INCL CAD: CPT | Mod: TC

## 2024-09-25 ENCOUNTER — LAB VISIT (OUTPATIENT)
Dept: LAB | Facility: HOSPITAL | Age: 64
End: 2024-09-25
Attending: FAMILY MEDICINE
Payer: MEDICARE

## 2024-09-25 DIAGNOSIS — E11.9 DIABETES MELLITUS WITHOUT COMPLICATION: Primary | ICD-10-CM

## 2024-09-25 LAB
ALBUMIN SERPL-MCNC: 4.1 G/DL (ref 3.4–4.8)
ALBUMIN/GLOB SERPL: 1.3 RATIO (ref 1.1–2)
ALP SERPL-CCNC: 76 UNIT/L (ref 40–150)
ALT SERPL-CCNC: 21 UNIT/L (ref 0–55)
ANION GAP SERPL CALC-SCNC: 10 MEQ/L
AST SERPL-CCNC: 22 UNIT/L (ref 5–34)
BILIRUB SERPL-MCNC: 0.3 MG/DL
BUN SERPL-MCNC: 13.9 MG/DL (ref 9.8–20.1)
CALCIUM SERPL-MCNC: 10 MG/DL (ref 8.4–10.2)
CHLORIDE SERPL-SCNC: 107 MMOL/L (ref 98–107)
CO2 SERPL-SCNC: 23 MMOL/L (ref 23–31)
CREAT SERPL-MCNC: 0.92 MG/DL (ref 0.55–1.02)
CREAT/UREA NIT SERPL: 15
EST. AVERAGE GLUCOSE BLD GHB EST-MCNC: 243.2 MG/DL
GFR SERPLBLD CREATININE-BSD FMLA CKD-EPI: >60 ML/MIN/1.73/M2
GLOBULIN SER-MCNC: 3.1 GM/DL (ref 2.4–3.5)
GLUCOSE SERPL-MCNC: 147 MG/DL (ref 82–115)
HBA1C MFR BLD: 10.1 %
POTASSIUM SERPL-SCNC: 4.2 MMOL/L (ref 3.5–5.1)
PROT SERPL-MCNC: 7.2 GM/DL (ref 5.8–7.6)
SODIUM SERPL-SCNC: 140 MMOL/L (ref 136–145)

## 2024-09-25 PROCEDURE — 36415 COLL VENOUS BLD VENIPUNCTURE: CPT

## 2024-09-25 PROCEDURE — 83036 HEMOGLOBIN GLYCOSYLATED A1C: CPT

## 2024-09-25 PROCEDURE — 80053 COMPREHEN METABOLIC PANEL: CPT

## 2024-10-15 ENCOUNTER — ANTI-COAG VISIT (OUTPATIENT)
Dept: CARDIOLOGY | Facility: HOSPITAL | Age: 64
End: 2024-10-15
Attending: INTERNAL MEDICINE
Payer: MEDICARE

## 2024-10-15 LAB — INR PPP: 2.1 (ref 2–3)

## 2024-10-15 PROCEDURE — 99211 OFF/OP EST MAY X REQ PHY/QHP: CPT

## 2024-10-15 PROCEDURE — 85610 PROTHROMBIN TIME: CPT

## 2024-11-12 ENCOUNTER — ANTI-COAG VISIT (OUTPATIENT)
Dept: CARDIOLOGY | Facility: HOSPITAL | Age: 64
End: 2024-11-12
Attending: INTERNAL MEDICINE
Payer: MEDICARE

## 2024-11-12 LAB — INR PPP: 3.3 (ref 2–3)

## 2024-11-12 PROCEDURE — 85610 PROTHROMBIN TIME: CPT

## 2024-11-12 PROCEDURE — 99211 OFF/OP EST MAY X REQ PHY/QHP: CPT

## 2024-12-03 ENCOUNTER — ANTI-COAG VISIT (OUTPATIENT)
Dept: CARDIOLOGY | Facility: HOSPITAL | Age: 64
End: 2024-12-03
Attending: INTERNAL MEDICINE
Payer: MEDICARE

## 2024-12-03 LAB — INR PPP: 2.9 (ref 2–3)

## 2024-12-30 ENCOUNTER — LAB VISIT (OUTPATIENT)
Dept: LAB | Facility: HOSPITAL | Age: 64
End: 2024-12-30
Attending: FAMILY MEDICINE
Payer: MEDICARE

## 2024-12-30 DIAGNOSIS — E11.9 DIABETES MELLITUS WITHOUT COMPLICATION: Primary | ICD-10-CM

## 2024-12-30 DIAGNOSIS — R32 UNSPECIFIED URINARY INCONTINENCE: ICD-10-CM

## 2024-12-30 DIAGNOSIS — I10 ESSENTIAL HYPERTENSION, MALIGNANT: ICD-10-CM

## 2024-12-30 DIAGNOSIS — E78.5 HYPERLIPIDEMIA, UNSPECIFIED HYPERLIPIDEMIA TYPE: ICD-10-CM

## 2024-12-30 LAB
ALBUMIN SERPL-MCNC: 3.9 G/DL (ref 3.4–4.8)
ALBUMIN/GLOB SERPL: 1.2 RATIO (ref 1.1–2)
ALP SERPL-CCNC: 68 UNIT/L (ref 40–150)
ALT SERPL-CCNC: 20 UNIT/L (ref 0–55)
ANION GAP SERPL CALC-SCNC: 8 MEQ/L
AST SERPL-CCNC: 19 UNIT/L (ref 5–34)
BACTERIA #/AREA URNS AUTO: ABNORMAL /HPF
BILIRUB SERPL-MCNC: 0.3 MG/DL
BILIRUB UR QL STRIP.AUTO: NEGATIVE
BUN SERPL-MCNC: 13 MG/DL (ref 9.8–20.1)
CALCIUM SERPL-MCNC: 9.4 MG/DL (ref 8.4–10.2)
CHLORIDE SERPL-SCNC: 107 MMOL/L (ref 98–107)
CLARITY UR: CLEAR
CO2 SERPL-SCNC: 23 MMOL/L (ref 23–31)
COLOR UR AUTO: ABNORMAL
CREAT SERPL-MCNC: 0.82 MG/DL (ref 0.55–1.02)
CREAT/UREA NIT SERPL: 16
EST. AVERAGE GLUCOSE BLD GHB EST-MCNC: 226 MG/DL
GFR SERPLBLD CREATININE-BSD FMLA CKD-EPI: >60 ML/MIN/1.73/M2
GLOBULIN SER-MCNC: 3.2 GM/DL (ref 2.4–3.5)
GLUCOSE SERPL-MCNC: 161 MG/DL (ref 82–115)
GLUCOSE UR QL STRIP: >=1000
HBA1C MFR BLD: 9.5 %
HGB UR QL STRIP: NEGATIVE
KETONES UR QL STRIP: NEGATIVE
LEUKOCYTE ESTERASE UR QL STRIP: NEGATIVE
NITRITE UR QL STRIP: NEGATIVE
PH UR STRIP: 6 [PH]
POTASSIUM SERPL-SCNC: 4.2 MMOL/L (ref 3.5–5.1)
PROT SERPL-MCNC: 7.1 GM/DL (ref 5.8–7.6)
PROT UR QL STRIP: NEGATIVE
RBC #/AREA URNS AUTO: ABNORMAL /HPF
SODIUM SERPL-SCNC: 138 MMOL/L (ref 136–145)
SP GR UR STRIP.AUTO: 1.02 (ref 1–1.03)
SQUAMOUS #/AREA URNS AUTO: ABNORMAL /HPF
UROBILINOGEN UR STRIP-ACNC: 0.2
WBC #/AREA URNS AUTO: ABNORMAL /HPF

## 2024-12-30 PROCEDURE — 36415 COLL VENOUS BLD VENIPUNCTURE: CPT

## 2024-12-30 PROCEDURE — 81003 URINALYSIS AUTO W/O SCOPE: CPT

## 2024-12-30 PROCEDURE — 80053 COMPREHEN METABOLIC PANEL: CPT

## 2024-12-30 PROCEDURE — 83036 HEMOGLOBIN GLYCOSYLATED A1C: CPT

## 2025-01-09 ENCOUNTER — ANTI-COAG VISIT (OUTPATIENT)
Dept: CARDIOLOGY | Facility: HOSPITAL | Age: 65
End: 2025-01-09
Attending: INTERNAL MEDICINE
Payer: MEDICARE

## 2025-01-09 LAB — INR PPP: 2.8 (ref 2–3)

## 2025-01-09 PROCEDURE — 99211 OFF/OP EST MAY X REQ PHY/QHP: CPT

## 2025-01-09 PROCEDURE — 85610 PROTHROMBIN TIME: CPT

## 2025-02-06 ENCOUNTER — ANTI-COAG VISIT (OUTPATIENT)
Dept: CARDIOLOGY | Facility: HOSPITAL | Age: 65
End: 2025-02-06
Attending: INTERNAL MEDICINE
Payer: MEDICARE

## 2025-02-06 LAB — INR PPP: 3.6 (ref 2–3)

## 2025-02-06 PROCEDURE — 99211 OFF/OP EST MAY X REQ PHY/QHP: CPT

## 2025-02-06 PROCEDURE — 85610 PROTHROMBIN TIME: CPT

## 2025-02-27 ENCOUNTER — ANTI-COAG VISIT (OUTPATIENT)
Dept: CARDIOLOGY | Facility: HOSPITAL | Age: 65
End: 2025-02-27
Attending: INTERNAL MEDICINE
Payer: MEDICARE

## 2025-02-27 LAB — INR PPP: 2.6 (ref 2–3)

## 2025-02-27 PROCEDURE — 85610 PROTHROMBIN TIME: CPT

## 2025-03-27 ENCOUNTER — ANTI-COAG VISIT (OUTPATIENT)
Dept: CARDIOLOGY | Facility: HOSPITAL | Age: 65
End: 2025-03-27
Attending: INTERNAL MEDICINE
Payer: MEDICARE

## 2025-03-27 LAB — INR PPP: 3.5 (ref 2–3)

## 2025-03-27 PROCEDURE — 99211 OFF/OP EST MAY X REQ PHY/QHP: CPT

## 2025-03-27 PROCEDURE — 85610 PROTHROMBIN TIME: CPT

## 2025-04-02 ENCOUNTER — HOSPITAL ENCOUNTER (EMERGENCY)
Facility: HOSPITAL | Age: 65
Discharge: HOME | End: 2025-04-02
Attending: FAMILY MEDICINE
Payer: MEDICARE

## 2025-04-02 VITALS
SYSTOLIC BLOOD PRESSURE: 137 MMHG | TEMPERATURE: 99 F | HEART RATE: 94 BPM | WEIGHT: 210 LBS | OXYGEN SATURATION: 99 % | DIASTOLIC BLOOD PRESSURE: 77 MMHG | BODY MASS INDEX: 32.89 KG/M2 | RESPIRATION RATE: 24 BRPM

## 2025-04-02 DIAGNOSIS — B96.89 ACUTE BACTERIAL SINUSITIS: Primary | ICD-10-CM

## 2025-04-02 DIAGNOSIS — J01.90 ACUTE BACTERIAL SINUSITIS: Primary | ICD-10-CM

## 2025-04-02 LAB
FLUAV AG UPPER RESP QL IA.RAPID: NOT DETECTED
FLUBV AG UPPER RESP QL IA.RAPID: NOT DETECTED
RSV A 5' UTR RNA NPH QL NAA+PROBE: NOT DETECTED
SARS-COV-2 RNA RESP QL NAA+PROBE: NOT DETECTED
STREP A PCR (OHS): NOT DETECTED

## 2025-04-02 PROCEDURE — 96372 THER/PROPH/DIAG INJ SC/IM: CPT | Performed by: FAMILY MEDICINE

## 2025-04-02 PROCEDURE — 87651 STREP A DNA AMP PROBE: CPT | Performed by: FAMILY MEDICINE

## 2025-04-02 PROCEDURE — 25000003 PHARM REV CODE 250: Performed by: FAMILY MEDICINE

## 2025-04-02 PROCEDURE — 63600175 PHARM REV CODE 636 W HCPCS: Performed by: FAMILY MEDICINE

## 2025-04-02 PROCEDURE — 0241U COVID/RSV/FLU A&B PCR: CPT | Performed by: FAMILY MEDICINE

## 2025-04-02 PROCEDURE — 99284 EMERGENCY DEPT VISIT MOD MDM: CPT | Mod: 25

## 2025-04-02 RX ORDER — ACETAMINOPHEN 500 MG
1000 TABLET ORAL
Status: COMPLETED | OUTPATIENT
Start: 2025-04-02 | End: 2025-04-02

## 2025-04-02 RX ORDER — PREDNISONE 20 MG/1
20 TABLET ORAL DAILY
Qty: 5 TABLET | Refills: 0 | Status: SHIPPED | OUTPATIENT
Start: 2025-04-02 | End: 2025-04-07

## 2025-04-02 RX ORDER — IBUPROFEN 800 MG/1
800 TABLET ORAL
Status: COMPLETED | OUTPATIENT
Start: 2025-04-02 | End: 2025-04-02

## 2025-04-02 RX ORDER — AMOXICILLIN AND CLAVULANATE POTASSIUM 875; 125 MG/1; MG/1
1 TABLET, FILM COATED ORAL 2 TIMES DAILY
Qty: 14 TABLET | Refills: 0 | Status: SHIPPED | OUTPATIENT
Start: 2025-04-02

## 2025-04-02 RX ORDER — DEXAMETHASONE SODIUM PHOSPHATE 4 MG/ML
8 INJECTION, SOLUTION INTRA-ARTICULAR; INTRALESIONAL; INTRAMUSCULAR; INTRAVENOUS; SOFT TISSUE
Status: COMPLETED | OUTPATIENT
Start: 2025-04-02 | End: 2025-04-02

## 2025-04-02 RX ADMIN — IBUPROFEN 800 MG: 800 TABLET, FILM COATED ORAL at 08:04

## 2025-04-02 RX ADMIN — DEXAMETHASONE SODIUM PHOSPHATE 8 MG: 4 INJECTION, SOLUTION INTRA-ARTICULAR; INTRALESIONAL; INTRAMUSCULAR; INTRAVENOUS; SOFT TISSUE at 08:04

## 2025-04-02 RX ADMIN — ACETAMINOPHEN 1000 MG: 500 TABLET ORAL at 08:04

## 2025-04-02 NOTE — ED PROVIDER NOTES
Encounter Date: 4/2/2025       History     Chief Complaint   Patient presents with    Headache    Sore Throat    sinus congestion     Started on Monday/ yesterday started with sore throat and cough     65-year-old presents with nasal congestion sinus sore throat headache started yesterday no fevers no chills vital signs stable physical exam shows some tenderness over the sinuses        Review of patient's allergies indicates:   Allergen Reactions    Tramadol Itching     Past Medical History:   Diagnosis Date    Claustrophobia     Diabetes mellitus, type 2     DVT (deep venous thrombosis)     High cholesterol     Hypertension     Pulmonary embolism      Past Surgical History:   Procedure Laterality Date    ARTHROSCOPIC REPAIR OF ROTATOR CUFF OF SHOULDER Right 10/17/2022    Procedure: REPAIR, ROTATOR CUFF, ARTHROSCOPIC;  Surgeon: Freddie Jay MD;  Location: [facility] OR;  Service: Orthopedics;  Laterality: Right;    HYSTERECTOMY       Family History   Problem Relation Name Age of Onset    Diabetes Mother      Stroke Father      Heart disease Sister      Clotting disorder Sister       Social History[1]  Review of Systems   HENT:  Positive for congestion, postnasal drip, sinus pressure and sinus pain.    Respiratory:  Positive for cough.    Musculoskeletal:  Positive for myalgias.   All other systems reviewed and are negative.      Physical Exam     Initial Vitals [04/02/25 0801]   BP Pulse Resp Temp SpO2   137/77 94 (!) 24 98.8 °F (37.1 °C) 99 %      MAP       --         Physical Exam    Nursing note and vitals reviewed.  Constitutional: She appears well-developed and well-nourished. She is active.   HENT:   Head: Normocephalic and atraumatic.   Eyes: Conjunctivae, EOM and lids are normal. Pupils are equal, round, and reactive to light.   Neck: Trachea normal and phonation normal. Neck supple. No thyroid mass present.   Normal range of motion.  Cardiovascular:  Normal rate, regular rhythm, normal heart sounds and normal  pulses.           Pulmonary/Chest: Breath sounds normal.   Abdominal: Abdomen is soft. Bowel sounds are normal.   Musculoskeletal:         General: Normal range of motion.      Cervical back: Normal range of motion and neck supple.     Neurological: She is alert and oriented to person, place, and time. She has normal strength and normal reflexes.   Skin: Skin is warm and intact.   Psychiatric: She has a normal mood and affect. Her speech is normal and behavior is normal. Judgment and thought content normal. Cognition and memory are normal.         ED Course   Procedures  Labs Reviewed   COVID/RSV/FLU A&B PCR - Normal       Result Value    Influenza A PCR Not Detected      Influenza B PCR Not Detected      Respiratory Syncytial Virus PCR Not Detected      SARS-CoV-2 PCR Not Detected      Narrative:     The Xpert Xpress SARS-CoV-2/FLU/RSV plus is a rapid, multiplexed real-time PCR test intended for the simultaneous qualitative detection and differentiation of SARS-CoV-2, Influenza A, Influenza B, and respiratory syncytial virus (RSV) viral RNA in either nasopharyngeal swab or nasal swab specimens.         STREP GROUP A BY PCR - Normal    STREP A PCR ([facility]) Not Detected      Narrative:     The Xpert Xpress Strep A test is a rapid, qualitative in vitro diagnostic test for the detection of Streptococcus pyogenes (Group A ß-hemolytic Streptococcus, Strep A) in throat swab specimens from patients with signs and symptoms of pharyngitis.            Imaging Results    None          Medications   ibuprofen tablet 800 mg (800 mg Oral Given 4/2/25 0847)   acetaminophen tablet 1,000 mg (1,000 mg Oral Given 4/2/25 0848)   dexAMETHasone injection 8 mg (8 mg Intramuscular Given 4/2/25 0848)     Medical Decision Making  65-year-old presents with nasal congestion sinus sore throat headache started yesterday no fevers no chills vital signs stable physical exam shows some tenderness over the sinuses      Swabs came back negative  discussed findings and treatment plan with the patient she understands agrees with the plan    Amount and/or Complexity of Data Reviewed  Labs: ordered. Decision-making details documented in ED Course.    Risk  OTC drugs.  Prescription drug management.  Risk Details: Differential diagnosis allergies viral sinusitis bacterial sinusitis               ED Course as of 04/02/25 0908 Wed Apr 02, 2025   0848 STREP A PCR ([facility]): Not Detected [BL]   0906 Influenza A, Molecular: Not Detected [BL]   0906 Influenza B, Molecular: Not Detected [BL]   0906 RSV Ag by Molecular Method: Not Detected [BL]   0906 SARS-CoV2 (COVID-19) Qualitative PCR: Not Detected [BL]      ED Course User Index  [BL] Brendon Patrick MD                           Clinical Impression:  Final diagnoses:  [J01.90, B96.89] Acute bacterial sinusitis (Primary)          ED Disposition Condition    Discharge Stable          ED Prescriptions       Medication Sig Dispense Start Date End Date Auth. Provider    amoxicillin-clavulanate 875-125mg (AUGMENTIN) 875-125 mg per tablet Take 1 tablet by mouth 2 (two) times daily. 14 tablet 4/2/2025 -- Brendon Patrick MD    predniSONE (DELTASONE) 20 MG tablet Take 1 tablet (20 mg total) by mouth once daily. for 5 days 5 tablet 4/2/2025 4/7/2025 Brendon Patrick MD          Follow-up Information    None            [1]   Social History  Tobacco Use    Smoking status: Every Day     Current packs/day: 0.50     Average packs/day: 0.5 packs/day for 32.0 years (16.0 ttl pk-yrs)     Types: Cigarettes    Smokeless tobacco: Never   Substance Use Topics    Alcohol use: Never    Drug use: Never        Brendon Patrick MD  04/02/25 0908

## 2025-04-02 NOTE — ED NOTES
Pt presents with c/o generalized body aches, headache, cough, congestion that started Monday; denies any fever;

## 2025-04-24 ENCOUNTER — ANTI-COAG VISIT (OUTPATIENT)
Dept: CARDIOLOGY | Facility: HOSPITAL | Age: 65
End: 2025-04-24
Attending: INTERNAL MEDICINE
Payer: MEDICARE

## 2025-04-24 LAB — INR PPP: 3.6 (ref 2–3)

## 2025-04-24 PROCEDURE — 99211 OFF/OP EST MAY X REQ PHY/QHP: CPT

## 2025-04-24 PROCEDURE — 85610 PROTHROMBIN TIME: CPT

## 2025-05-22 ENCOUNTER — ANTI-COAG VISIT (OUTPATIENT)
Dept: CARDIOLOGY | Facility: HOSPITAL | Age: 65
End: 2025-05-22
Attending: INTERNAL MEDICINE
Payer: MEDICARE

## 2025-05-22 ENCOUNTER — HOSPITAL ENCOUNTER (OUTPATIENT)
Dept: RADIOLOGY | Facility: HOSPITAL | Age: 65
Discharge: HOME | End: 2025-05-22
Attending: FAMILY MEDICINE
Payer: MEDICARE

## 2025-05-22 DIAGNOSIS — F17.200 SMOKER: ICD-10-CM

## 2025-05-22 DIAGNOSIS — F17.200 SMOKER: Primary | ICD-10-CM

## 2025-05-22 LAB — INR PPP: 3.5 (ref 2–3)

## 2025-05-22 PROCEDURE — 85610 PROTHROMBIN TIME: CPT

## 2025-05-22 PROCEDURE — 99211 OFF/OP EST MAY X REQ PHY/QHP: CPT | Mod: 25

## 2025-05-22 PROCEDURE — 71046 X-RAY EXAM CHEST 2 VIEWS: CPT | Mod: TC

## 2025-06-19 ENCOUNTER — ANTI-COAG VISIT (OUTPATIENT)
Dept: CARDIOLOGY | Facility: HOSPITAL | Age: 65
End: 2025-06-19
Attending: INTERNAL MEDICINE
Payer: MEDICARE

## 2025-06-19 LAB — INR PPP: 2.7 (ref 2–3)

## 2025-06-19 PROCEDURE — 85610 PROTHROMBIN TIME: CPT

## 2025-06-19 PROCEDURE — 99211 OFF/OP EST MAY X REQ PHY/QHP: CPT

## 2025-07-17 ENCOUNTER — ANTI-COAG VISIT (OUTPATIENT)
Dept: CARDIOLOGY | Facility: HOSPITAL | Age: 65
End: 2025-07-17
Attending: INTERNAL MEDICINE
Payer: MEDICARE

## 2025-07-17 LAB — INR PPP: 3.1 (ref 2–3)

## 2025-07-17 PROCEDURE — 99211 OFF/OP EST MAY X REQ PHY/QHP: CPT

## 2025-07-17 PROCEDURE — 85610 PROTHROMBIN TIME: CPT

## 2025-08-28 ENCOUNTER — HOSPITAL ENCOUNTER (OUTPATIENT)
Dept: RADIOLOGY | Facility: HOSPITAL | Age: 65
Discharge: HOME | End: 2025-08-28
Attending: FAMILY MEDICINE
Payer: MEDICARE

## 2025-08-28 ENCOUNTER — ANTI-COAG VISIT (OUTPATIENT)
Dept: CARDIOLOGY | Facility: HOSPITAL | Age: 65
End: 2025-08-28
Attending: INTERNAL MEDICINE
Payer: MEDICARE

## 2025-08-28 DIAGNOSIS — M25.559 HIP PAIN: ICD-10-CM

## 2025-08-28 DIAGNOSIS — M54.50 LUMBAGO: Primary | ICD-10-CM

## 2025-08-28 DIAGNOSIS — M54.50 LUMBAGO: ICD-10-CM

## 2025-08-28 LAB — INR PPP: 3.2 (ref 2–3)

## 2025-08-28 PROCEDURE — 72100 X-RAY EXAM L-S SPINE 2/3 VWS: CPT | Mod: TC

## 2025-08-28 PROCEDURE — 85610 PROTHROMBIN TIME: CPT

## 2025-08-28 PROCEDURE — 73502 X-RAY EXAM HIP UNI 2-3 VIEWS: CPT | Mod: TC,LT

## 2025-08-28 PROCEDURE — 73502 X-RAY EXAM HIP UNI 2-3 VIEWS: CPT | Mod: TC,RT

## (undated) DEVICE — GLOVE 8.0 PROTEXIS PI MICRO

## (undated) DEVICE — MANIFOLD 4 PORT

## (undated) DEVICE — TAPE MEDIPORE 4IN X 2YDS

## (undated) DEVICE — KIT SURGICAL TURNOVER

## (undated) DEVICE — SUPPORT SLING SHOT II MEDIUM

## (undated) DEVICE — GLOVE PROTEXIS BLUE LATEX 8.5

## (undated) DEVICE — DRAPE SHOULDER BEACH CHAIR

## (undated) DEVICE — DRAPE FULL SHEET 70X100IN

## (undated) DEVICE — SUT VICRYL PLUS 2-0 CT1 18

## (undated) DEVICE — DRAPE ARTHSCP W/POUCH 92X119IN

## (undated) DEVICE — SUT FIBERWIRE

## (undated) DEVICE — BLADE SURG CARBON STEEL SZ11

## (undated) DEVICE — STOCKINET 4INX48

## (undated) DEVICE — GLOVE PROTEXIS BLUE LATEX 6.5

## (undated) DEVICE — SUPPORT ULNA NERVE PROTECTOR

## (undated) DEVICE — GLOVE 6.5 PROTEXIS PI MICRO

## (undated) DEVICE — BLADE SHAVER GREAT WHITE 3.5MM

## (undated) DEVICE — RESTRAINT HEAD BCH CHR W/ CLIP

## (undated) DEVICE — COVER PROXIMA MAYO STAND

## (undated) DEVICE — GOWN POLY REINF X-LONG 2XL

## (undated) DEVICE — SOL NACL IRR 3000ML

## (undated) DEVICE — DRAPE STERI U-SHAPED 47X51IN

## (undated) DEVICE — GAUZE VISTEC XR DTECT 16 4X4IN

## (undated) DEVICE — HANDLE DEVON RIGID OR LIGHT

## (undated) DEVICE — DRESSING XEROFORM FOIL PK 1X8

## (undated) DEVICE — PACK ARTHROSCOPY

## (undated) DEVICE — SUT VICRYL PLUS 0 CT1 18IN

## (undated) DEVICE — NDL GUARD

## (undated) DEVICE — TUBE SET INFLOW/OUTFLOW

## (undated) DEVICE — GAUZE SPONGE 4X4 12PLY

## (undated) DEVICE — SPONGE LAP STRL 18X18IN

## (undated) DEVICE — SYR 50CC LL

## (undated) DEVICE — MARKER WRITESITE SKIN CHLRAPRP

## (undated) DEVICE — POSITIONER HEAD ADULT

## (undated) DEVICE — ADHESIVE MASTISOL VIAL 48/BX

## (undated) DEVICE — PENCIL ELECSURG ROCKER 15FT

## (undated) DEVICE — TAPE BANDAGE TIGER 7 IN

## (undated) DEVICE — MAT QUICK 40X30 FLOOR FLUID LF

## (undated) DEVICE — DRAPE U-DRAPE ADHESIVE 60X60IN

## (undated) DEVICE — BLADE SURG STAINLESS STEEL #15

## (undated) DEVICE — CLOSURE SKIN STERI STRIP 1/2X4

## (undated) DEVICE — SUT 3-0 MONOCRYL PLUS PS-2

## (undated) DEVICE — TIP SUCTION YANKAUER

## (undated) DEVICE — APPLICATOR CHLORAPREP ORN 26ML

## (undated) DEVICE — NDL ANES SPINAL 18X3.5ST 18G

## (undated) DEVICE — FIBERTAPE COLLAGEN COATED

## (undated) DEVICE — ELECTRODE PATIENT RETURN DISP

## (undated) DEVICE — PAD ABDOMINAL STERILE 8X10IN

## (undated) DEVICE — NDL SUREFIRE SCORPION RC